# Patient Record
Sex: MALE | Race: WHITE | NOT HISPANIC OR LATINO | Employment: OTHER | URBAN - METROPOLITAN AREA
[De-identification: names, ages, dates, MRNs, and addresses within clinical notes are randomized per-mention and may not be internally consistent; named-entity substitution may affect disease eponyms.]

---

## 2021-02-23 ENCOUNTER — APPOINTMENT (EMERGENCY)
Dept: RADIOLOGY | Facility: HOSPITAL | Age: 86
DRG: 556 | End: 2021-02-23
Payer: MEDICARE

## 2021-02-23 ENCOUNTER — HOSPITAL ENCOUNTER (INPATIENT)
Facility: HOSPITAL | Age: 86
LOS: 3 days | Discharge: NURSING, REHAB OR LONG-TERM CARE | DRG: 556 | End: 2021-02-26
Attending: EMERGENCY MEDICINE | Admitting: INTERNAL MEDICINE
Payer: MEDICARE

## 2021-02-23 DIAGNOSIS — S50.311A ABRASION OF RIGHT ELBOW, INITIAL ENCOUNTER: ICD-10-CM

## 2021-02-23 DIAGNOSIS — M25.552 HIP TENDERNESS, LEFT: ICD-10-CM

## 2021-02-23 DIAGNOSIS — S80.02XA TRAUMATIC ECCHYMOSIS OF LEFT KNEE, INITIAL ENCOUNTER: ICD-10-CM

## 2021-02-23 DIAGNOSIS — S59.901A ELBOW INJURY, RIGHT, INITIAL ENCOUNTER: ICD-10-CM

## 2021-02-23 DIAGNOSIS — R26.2 AMBULATORY DYSFUNCTION: Primary | ICD-10-CM

## 2021-02-23 DIAGNOSIS — M25.562 ACUTE PAIN OF LEFT KNEE: ICD-10-CM

## 2021-02-23 PROBLEM — R29.6 MULTIPLE FALLS: Status: ACTIVE | Noted: 2021-02-23

## 2021-02-23 PROBLEM — M54.6 CHRONIC MIDLINE THORACIC BACK PAIN: Status: ACTIVE | Noted: 2021-02-23

## 2021-02-23 PROBLEM — Z85.51 HISTORY OF BLADDER CANCER: Status: ACTIVE | Noted: 2021-02-23

## 2021-02-23 PROBLEM — I10 BENIGN ESSENTIAL HYPERTENSION: Status: ACTIVE | Noted: 2021-02-23

## 2021-02-23 PROBLEM — N17.9 ACUTE RENAL FAILURE (ARF) (HCC): Status: ACTIVE | Noted: 2021-02-23

## 2021-02-23 PROBLEM — G89.29 CHRONIC MIDLINE THORACIC BACK PAIN: Status: ACTIVE | Noted: 2021-02-23

## 2021-02-23 PROBLEM — K21.9 GASTROESOPHAGEAL REFLUX DISEASE WITHOUT ESOPHAGITIS: Status: ACTIVE | Noted: 2021-02-23

## 2021-02-23 LAB
ANION GAP SERPL CALCULATED.3IONS-SCNC: 9 MMOL/L (ref 4–13)
ATRIAL RATE: 110 BPM
BACTERIA UR QL AUTO: ABNORMAL /HPF
BASOPHILS # BLD AUTO: 0.07 THOUSANDS/ΜL (ref 0–0.1)
BASOPHILS NFR BLD AUTO: 1 % (ref 0–1)
BILIRUB UR QL STRIP: ABNORMAL
BUN SERPL-MCNC: 38 MG/DL (ref 5–25)
CALCIUM SERPL-MCNC: 9.1 MG/DL (ref 8.3–10.1)
CHLORIDE SERPL-SCNC: 106 MMOL/L (ref 100–108)
CLARITY UR: CLEAR
CO2 SERPL-SCNC: 22 MMOL/L (ref 21–32)
COLOR UR: ABNORMAL
CREAT SERPL-MCNC: 1.71 MG/DL (ref 0.6–1.3)
EOSINOPHIL # BLD AUTO: 0.02 THOUSAND/ΜL (ref 0–0.61)
EOSINOPHIL NFR BLD AUTO: 0 % (ref 0–6)
ERYTHROCYTE [DISTWIDTH] IN BLOOD BY AUTOMATED COUNT: 13.2 % (ref 11.6–15.1)
GFR SERPL CREATININE-BSD FRML MDRD: 32 ML/MIN/1.73SQ M
GLUCOSE SERPL-MCNC: 130 MG/DL (ref 65–140)
GLUCOSE UR STRIP-MCNC: NEGATIVE MG/DL
HCT VFR BLD AUTO: 45.4 % (ref 36.5–49.3)
HGB BLD-MCNC: 14.5 G/DL (ref 12–17)
HGB UR QL STRIP.AUTO: ABNORMAL
HYALINE CASTS #/AREA URNS LPF: ABNORMAL /LPF
IMM GRANULOCYTES # BLD AUTO: 0.08 THOUSAND/UL (ref 0–0.2)
IMM GRANULOCYTES NFR BLD AUTO: 1 % (ref 0–2)
KETONES UR STRIP-MCNC: ABNORMAL MG/DL
LEUKOCYTE ESTERASE UR QL STRIP: NEGATIVE
LYMPHOCYTES # BLD AUTO: 1.22 THOUSANDS/ΜL (ref 0.6–4.47)
LYMPHOCYTES NFR BLD AUTO: 10 % (ref 14–44)
MCH RBC QN AUTO: 30.1 PG (ref 26.8–34.3)
MCHC RBC AUTO-ENTMCNC: 31.9 G/DL (ref 31.4–37.4)
MCV RBC AUTO: 94 FL (ref 82–98)
MONOCYTES # BLD AUTO: 1.11 THOUSAND/ΜL (ref 0.17–1.22)
MONOCYTES NFR BLD AUTO: 9 % (ref 4–12)
NEUTROPHILS # BLD AUTO: 9.64 THOUSANDS/ΜL (ref 1.85–7.62)
NEUTS SEG NFR BLD AUTO: 79 % (ref 43–75)
NITRITE UR QL STRIP: NEGATIVE
NON-SQ EPI CELLS URNS QL MICRO: ABNORMAL /HPF
NRBC BLD AUTO-RTO: 0 /100 WBCS
P AXIS: 62 DEGREES
PH UR STRIP.AUTO: 5.5 [PH]
PLATELET # BLD AUTO: 358 THOUSANDS/UL (ref 149–390)
PMV BLD AUTO: 9.5 FL (ref 8.9–12.7)
POTASSIUM SERPL-SCNC: 4.8 MMOL/L (ref 3.5–5.3)
PR INTERVAL: 148 MS
PROT UR STRIP-MCNC: ABNORMAL MG/DL
QRS AXIS: 43 DEGREES
QRSD INTERVAL: 128 MS
QT INTERVAL: 346 MS
QTC INTERVAL: 468 MS
RBC # BLD AUTO: 4.82 MILLION/UL (ref 3.88–5.62)
RBC #/AREA URNS AUTO: ABNORMAL /HPF
SODIUM SERPL-SCNC: 137 MMOL/L (ref 136–145)
SP GR UR STRIP.AUTO: 1.03 (ref 1–1.03)
T WAVE AXIS: -14 DEGREES
UROBILINOGEN UR QL STRIP.AUTO: 1 E.U./DL
VENTRICULAR RATE: 110 BPM
WBC # BLD AUTO: 12.14 THOUSAND/UL (ref 4.31–10.16)
WBC #/AREA URNS AUTO: ABNORMAL /HPF

## 2021-02-23 PROCEDURE — 93005 ELECTROCARDIOGRAM TRACING: CPT

## 2021-02-23 PROCEDURE — 85025 COMPLETE CBC W/AUTO DIFF WBC: CPT | Performed by: EMERGENCY MEDICINE

## 2021-02-23 PROCEDURE — 1124F ACP DISCUSS-NO DSCNMKR DOCD: CPT | Performed by: EMERGENCY MEDICINE

## 2021-02-23 PROCEDURE — 81001 URINALYSIS AUTO W/SCOPE: CPT | Performed by: INTERNAL MEDICINE

## 2021-02-23 PROCEDURE — 97167 OT EVAL HIGH COMPLEX 60 MIN: CPT

## 2021-02-23 PROCEDURE — 99285 EMERGENCY DEPT VISIT HI MDM: CPT

## 2021-02-23 PROCEDURE — 97163 PT EVAL HIGH COMPLEX 45 MIN: CPT

## 2021-02-23 PROCEDURE — 73560 X-RAY EXAM OF KNEE 1 OR 2: CPT

## 2021-02-23 PROCEDURE — G1004 CDSM NDSC: HCPCS

## 2021-02-23 PROCEDURE — 99232 SBSQ HOSP IP/OBS MODERATE 35: CPT | Performed by: INTERNAL MEDICINE

## 2021-02-23 PROCEDURE — 36415 COLL VENOUS BLD VENIPUNCTURE: CPT | Performed by: EMERGENCY MEDICINE

## 2021-02-23 PROCEDURE — 90715 TDAP VACCINE 7 YRS/> IM: CPT | Performed by: EMERGENCY MEDICINE

## 2021-02-23 PROCEDURE — 73502 X-RAY EXAM HIP UNI 2-3 VIEWS: CPT

## 2021-02-23 PROCEDURE — 99223 1ST HOSP IP/OBS HIGH 75: CPT | Performed by: INTERNAL MEDICINE

## 2021-02-23 PROCEDURE — 73080 X-RAY EXAM OF ELBOW: CPT

## 2021-02-23 PROCEDURE — 93010 ELECTROCARDIOGRAM REPORT: CPT | Performed by: INTERNAL MEDICINE

## 2021-02-23 PROCEDURE — 73030 X-RAY EXAM OF SHOULDER: CPT

## 2021-02-23 PROCEDURE — 90471 IMMUNIZATION ADMIN: CPT

## 2021-02-23 PROCEDURE — 99285 EMERGENCY DEPT VISIT HI MDM: CPT | Performed by: EMERGENCY MEDICINE

## 2021-02-23 PROCEDURE — 80048 BASIC METABOLIC PNL TOTAL CA: CPT | Performed by: EMERGENCY MEDICINE

## 2021-02-23 PROCEDURE — 70450 CT HEAD/BRAIN W/O DYE: CPT

## 2021-02-23 RX ORDER — SODIUM CHLORIDE 9 MG/ML
40 INJECTION, SOLUTION INTRAVENOUS CONTINUOUS
Status: DISCONTINUED | OUTPATIENT
Start: 2021-02-23 | End: 2021-02-26 | Stop reason: HOSPADM

## 2021-02-23 RX ORDER — PANTOPRAZOLE SODIUM 20 MG/1
20 TABLET, DELAYED RELEASE ORAL
Status: DISCONTINUED | OUTPATIENT
Start: 2021-02-23 | End: 2021-02-26 | Stop reason: HOSPADM

## 2021-02-23 RX ORDER — ONDANSETRON 2 MG/ML
4 INJECTION INTRAMUSCULAR; INTRAVENOUS EVERY 6 HOURS PRN
Status: DISCONTINUED | OUTPATIENT
Start: 2021-02-23 | End: 2021-02-26 | Stop reason: HOSPADM

## 2021-02-23 RX ORDER — MAGNESIUM HYDROXIDE/ALUMINUM HYDROXICE/SIMETHICONE 120; 1200; 1200 MG/30ML; MG/30ML; MG/30ML
15 SUSPENSION ORAL EVERY 6 HOURS PRN
Status: DISCONTINUED | OUTPATIENT
Start: 2021-02-23 | End: 2021-02-26 | Stop reason: HOSPADM

## 2021-02-23 RX ORDER — DOCUSATE SODIUM 100 MG/1
100 CAPSULE, LIQUID FILLED ORAL 2 TIMES DAILY PRN
Status: DISCONTINUED | OUTPATIENT
Start: 2021-02-23 | End: 2021-02-26 | Stop reason: HOSPADM

## 2021-02-23 RX ORDER — LOSARTAN POTASSIUM 50 MG/1
50 TABLET ORAL DAILY
Status: DISCONTINUED | OUTPATIENT
Start: 2021-02-23 | End: 2021-02-23

## 2021-02-23 RX ORDER — TRAMADOL HYDROCHLORIDE 50 MG/1
50 TABLET ORAL EVERY 8 HOURS PRN
Status: DISCONTINUED | OUTPATIENT
Start: 2021-02-23 | End: 2021-02-26 | Stop reason: HOSPADM

## 2021-02-23 RX ORDER — ACETAMINOPHEN 325 MG/1
650 TABLET ORAL EVERY 6 HOURS PRN
Status: DISCONTINUED | OUTPATIENT
Start: 2021-02-23 | End: 2021-02-26 | Stop reason: HOSPADM

## 2021-02-23 RX ORDER — LIDOCAINE 50 MG/G
1 PATCH TOPICAL DAILY
Status: DISCONTINUED | OUTPATIENT
Start: 2021-02-23 | End: 2021-02-26 | Stop reason: HOSPADM

## 2021-02-23 RX ORDER — ACETAMINOPHEN 325 MG/1
975 TABLET ORAL ONCE
Status: COMPLETED | OUTPATIENT
Start: 2021-02-23 | End: 2021-02-23

## 2021-02-23 RX ADMIN — SODIUM CHLORIDE 40 ML/HR: 0.9 INJECTION, SOLUTION INTRAVENOUS at 13:37

## 2021-02-23 RX ADMIN — ENOXAPARIN SODIUM 30 MG: 30 INJECTION SUBCUTANEOUS at 09:19

## 2021-02-23 RX ADMIN — ACETAMINOPHEN 975 MG: 325 TABLET ORAL at 03:37

## 2021-02-23 RX ADMIN — Medication 1 TABLET: at 09:19

## 2021-02-23 RX ADMIN — LIDOCAINE 1 PATCH: 50 PATCH TOPICAL at 09:20

## 2021-02-23 RX ADMIN — LOSARTAN POTASSIUM 50 MG: 50 TABLET, FILM COATED ORAL at 09:19

## 2021-02-23 RX ADMIN — TETANUS TOXOID, REDUCED DIPHTHERIA TOXOID AND ACELLULAR PERTUSSIS VACCINE, ADSORBED 0.5 ML: 5; 2.5; 8; 8; 2.5 SUSPENSION INTRAMUSCULAR at 04:01

## 2021-02-23 RX ADMIN — PANTOPRAZOLE SODIUM 20 MG: 20 TABLET, DELAYED RELEASE ORAL at 05:45

## 2021-02-23 NOTE — PHYSICAL THERAPY NOTE
Physical Therapy Evaluation     Patient's Name: Luci Luther    Admitting Diagnosis  Hip tenderness, left [M25 552]  Abrasion of right elbow, initial encounter [S50 311A]  Elbow injury, right, initial encounter [S59 901A]  Ambulatory dysfunction [R26 2]  Acute pain of left knee [M25 562]  Traumatic ecchymosis of left knee, initial encounter [S80 02XA]  Unspecified multiple injuries, initial encounter [T07  XXXA]    Problem List  Patient Active Problem List   Diagnosis    Ambulatory dysfunction    Multiple falls    Benign essential hypertension    Chronic midline thoracic back pain    Gastroesophageal reflux disease without esophagitis    History of bladder cancer    Acute renal failure (ARF) ([facility] [address] )       Past Medical History  History reviewed  No pertinent past medical history  Past Surgical History  Past Surgical History:   Procedure Laterality Date    ANKLE ARTHROSCOPY      BLEPHAROPLASTY      GALLBLADDER SURGERY      KNEE ARTHROSCOPY            02/23/21 1102   PT Last Visit   PT Visit Date 02/23/21   Note Type   Note type Evaluation   Pain Assessment   Pain Assessment Tool 0-10   Pain Score 4   Pain Location/Orientation Orientation: Right;Location: Shoulder   Hospital Pain Intervention(s) Repositioned; Ambulation/increased activity   Home Living   Type of [address] [place] [address] Two level;Bed/bath upstairs  (about 14 steps to 2nd floor (landing after 7 steps))   Bathroom Shower/Tub Walk-in shower   Bathroom Toilet Raised   Bathroom Equipment Shower chair   Home Equipment Walker; Wheelchair-manual;Other (Comment)  (RW with seat is primary device  WC for longer distances)   Additional Comments   (WC used when with family for longer distances)   Prior Function   Level of [place] Independent with ADLs and functional mobility; Needs assistance with IADLs; Other (Comment)  (does not drive)   Lives With Alone   Receives Help From Family  (daughter)   ADL Assistance Independent   IADLs Needs assistance Falls in the last 6 months 1 to 4  (2; both recent)   Vocational Retired   Comments Pt reports L LE buckling unpredictably resulting in falls   Restrictions/Precautions   Weight Bearing Precautions Per Order No   Other Precautions Chair Alarm; Fall Risk;Pain   General   Family/Caregiver Present No   Cognition   Overall Cognitive Status WFL   Orientation Level Oriented X4   Comments Pt pleasant and cooperative  Follows commands well  RLE Assessment   RLE Assessment WFL  (Hip flexion, knee extension, ankle DF and PF all grossly WFL)   LLE Assessment   LLE Assessment WFL  (Hip flexion, knee extension, ankle DF and PF all grossly WFL)   Coordination   Sensation WFL   Bed Mobility   Supine to Sit 3  Moderate assistance   Additional items Assist x 2;HOB elevated; Bedrails; Increased time required;Verbal cues;LE management   Sit to Supine Unable to assess   Transfers   Sit to Stand 3  Moderate assistance  (mod Ax1 with 1st rep; min Ax1 with 2nd rep)   Additional items Increased time required;Verbal cues; Assist x 1  (RW)   Stand to Sit 4  Minimal assistance   Additional items Increased time required;Verbal cues; Assist x 1   Ambulation/Elevation   Gait pattern Narrow HEATHER; Forward Flexion;Decreased foot clearance;Shuffling; Short stride; Excessively slow   Gait Assistance 4  Minimal assist   Additional items Assist x 1;Verbal cues   Assistive Device Rolling walker   Distance 20 ft; limited by fatigue  (+2 assist for chair follow)   Stair Management Assistance Not tested   Balance   Static Sitting Fair   Dynamic Sitting Fair -   Static Standing Fair -   Dynamic Standing Poor +   Ambulatory Poor +   Endurance Deficit   Endurance Deficit Yes   Endurance Deficit Description limited by pain, fatigue, weakness, and decreased activity tolerance   Activity Tolerance   Activity Tolerance Patient limited by fatigue;Patient limited by pain   Medical Staff Made Aware OT   Nurse Made Aware Yes   Assessment   Prognosis Good   Problem List Decreased endurance; Impaired balance;Pain;Decreased strength;Decreased mobility; Decreased safety awareness   Assessment Pt is a 80 y o male who presents to B with ambulatory dysfunction after having 2 falls within the past 24 hours  Pt taken to ED by EMS on 2/23/2021  Imaging negative  Pt c/o R elbow, R shoulder, and L hip discomfort related to falls  Pt has a PMH that includes HTN, L TKA, GERD, bladder CA (remission) and chronic back pain that is treated with Tramadol  Active PT orders for functional mobility assessment and D/C planning  Pt was pleasant and cooperative throughout visit  Pt reports that he lives alone in a [facility] with bed and bath on the 2nd floor and about 14 steps to the second floor with a landing after 7 steps  Pt states he was independent with ADLs at baseline but has assistance with IADLs including laundry  Pt does not drive  Pt reports using a Rollator with a seat around the house and has a wheelchair for longer distances  Other details of home setup can be found above  Pt required a Mod Ax2 for supine to sit transfer and needed verbal cues for hand placement as well as UB and LE management  Once seated EOB, pt was able to maintain balance without reports of dizziness  Pt required a Mod Ax1 for sit to stand transfer with RW and needed verbal cues for hand placement on RW and EOB for safety  Pt demonstrated shuffling gait and appeared apprehensive to take steps  Pt able to transfer with good control from stand to sit but still required a Min Ax1 for safety  After a quick break, pt was able to sit to stand transfer with a Min Ax1 and ambulated down the lockhart for about 20 feet with RW and chair follow before needing to sit down  Pt reported feeling slightly out of breath but VS stable  Pt was returned to room where he was left in chair with chair alarm on, call bell in reach and all other needs met  Other objective measures of assessment can be found above   At this time, pt would benefit from skilled home PT as long as patient has increased family support to assist him with ADLs/IADLs as pt is unsafe to live alone at this time  Spoke with CM about making these arrangements  If unable to receive family support, PT recommends post acute rehabilitation services  The patient's AM-PAC Basic Mobility Inpatient Short Form Raw Score is 11, Standardized Score is 30 25  A standardized score less than 42 9 suggests the patient may benefit from discharge to post-acute rehabilitation services  Please also refer to the recommendation of the Physical Therapist for safe discharge planning  At this time, pt is considered to be of high complexity secondary to risk for fal   Barriers to Discharge Decreased caregiver support  (Pt lives alone)   Goals   Patient Goals go home   STG Expiration Date 03/07/21   Short Term Goal #1 STG1: Pt will improve LE strength to assist in functional transfer of sit to stand and to decrease burden of caregiver  STG2: Pt will be able to transfer from sit to stand with Mod I to improve functional independence  STG3: Pt will be able to improve bed mobility to an independent level to decrease burden of caregiver and improve functional indepedence  STG4: Pt will improve in all balance categories by 1 grade to decrease risk of falls  STG5: Pt will be able to ambulate 150 ft Mod I to improve aerobic capacity and return to [facility] as a household ambulator  STG6: Pt will be able to ambulate up and down 14 steps Mod I to ensure safe D/C to home environment and improve functional mobility  PT Treatment Day 0   Plan   Treatment/Interventions Functional transfer training;LE strengthening/ROM; Elevations; Therapeutic exercise; Endurance training;Gait training;Spoke to nursing;Spoke to case management;OT;Bed mobility   PT Frequency Other (Comment)  (3-5x/wk)   Recommendation   PT Discharge Recommendation Post-Acute Rehabilitation Services  (pending progress and family support)   Equipment Recommended Corie Pollack  (pt owns Rollator with seat that he used at baseline)   PT - OK to Discharge Yes  (pending medical clearance)   Additional Comments D/C home with skilled therapy IF pt is able to receive increased family support  D/C post acute rehab IF no family support available as pt is unsafe to live alone     AM-PAC Basic Mobility Inpatient   Turning in Bed Without Bedrails 2   Lying on Back to Sitting on Edge of Flat Bed 1   Moving Bed to Chair 2   Standing Up From Chair 2   Walk in Room 3   Climb 3-5 Stairs 1   Basic Mobility Inpatient Raw Score 11   Basic Mobility Standardized Score 30 25         Taiwo Crimes, SPT

## 2021-02-23 NOTE — PLAN OF CARE
Problem: PAIN - ADULT  Goal: Verbalizes/displays adequate comfort level or baseline comfort level  Description: Interventions:  - Encourage patient to monitor pain and request assistance  - Assess pain using appropriate pain scale  - Administer analgesics based on type and severity of pain and evaluate response  - Implement non-pharmacological measures as appropriate and evaluate response  - Consider cultural and social influences on pain and pain management  - Notify physician/advanced practitioner if interventions unsuccessful or patient reports new pain  Outcome: Progressing     Problem: SAFETY ADULT  Goal: Patient will remain free of falls  Description: INTERVENTIONS:  - Assess patient frequently for physical needs  -  Identify cognitive and physical deficits and behaviors that affect risk of falls    -  [place] fall precautions as indicated by assessment   - Educate patient/family on patient safety including physical limitations  - Instruct patient to call for assistance with activity based on assessment  - Modify environment to reduce risk of injury  - Consider OT/PT consult to assist with strengthening/mobility  Outcome: Progressing  Goal: Maintain or return to baseline ADL function  Description: INTERVENTIONS:  -  Assess patient's ability to carry out ADLs; assess patient's baseline for ADL function and identify physical deficits which impact ability to perform ADLs (bathing, care of mouth/teeth, toileting, grooming, dressing, etc )  - Assess/evaluate cause of self-care deficits   - Assess range of motion  - Assess patient's mobility; develop plan if impaired  - Assess patient's need for assistive devices and provide as appropriate  - Encourage maximum independence but intervene and supervise when necessary  - Involve family in performance of ADLs  - Assess for home care needs following discharge   - Consider OT consult to assist with ADL evaluation and planning for discharge  - Provide patient education as appropriate  Outcome: Progressing  Goal: Maintain or return mobility status to optimal level  Description: INTERVENTIONS:  - Assess patient's baseline mobility status (ambulation, transfers, stairs, etc )    - Identify cognitive and physical deficits and behaviors that affect mobility  - Identify mobility aids required to assist with transfers and/or ambulation (gait belt, sit-to-stand, lift, walker, cane, etc )  - [place] fall precautions as indicated by assessment  - Record patient progress and toleration of activity level on Mobility SBAR; progress patient to next Phase/Stage  - Instruct patient to call for assistance with activity based on assessment  - Consider rehabilitation consult to assist with strengthening/weightbearing, etc   Outcome: Progressing     Problem: DISCHARGE PLANNING  Goal: Discharge to home or other facility with appropriate resources  Description: INTERVENTIONS:  - Identify barriers to discharge w/patient and caregiver  - Arrange for needed discharge resources and transportation as appropriate  - Identify discharge learning needs (meds, wound care, etc )  - Arrange for interpretive services to assist at discharge as needed  - Refer to Case Management Department for coordinating discharge planning if the patient needs post-hospital services based on physician/advanced practitioner order or complex needs related to functional status, cognitive ability, or social support system  Outcome: Progressing     Problem: Knowledge Deficit  Goal: Patient/family/caregiver demonstrates understanding of disease process, treatment plan, medications, and discharge instructions  Description: Complete learning assessment and assess knowledge base    Interventions:  - Provide teaching at level of understanding  - Provide teaching via preferred learning methods  Outcome: Progressing     Problem: Potential for Falls  Goal: Patient will remain free of falls  Description: INTERVENTIONS:  - Assess patient frequently for physical needs  -  Identify cognitive and physical deficits and behaviors that affect risk of falls    -  [place] fall precautions as indicated by assessment   - Educate patient/family on patient safety including physical limitations  - Instruct patient to call for assistance with activity based on assessment  - Modify environment to reduce risk of injury  - Consider OT/PT consult to assist with strengthening/mobility  Outcome: Progressing

## 2021-02-23 NOTE — ED ATTENDING ATTESTATION
2/23/2021  I, Hany Bassett MD, saw and evaluated the patient  I have discussed the patient with the resident/non-physician practitioner and agree with the resident's/non-physician practitioner's findings, Plan of Care, and MDM as documented in the resident's/non-physician practitioner's note, except where noted  All available labs and Radiology studies were reviewed  I was present for key portions of any procedure(s) performed by the resident/non-physician practitioner and I was immediately available to provide assistance  At this point I agree with the current assessment done in the Emergency Department  I have conducted an independent evaluation of this patient a history and physical is as follows:    ED Course      Emergency Department Note- Vickie Benjamin 80 y o  male MRN: [id number]    Unit/Bed#: ED 28 Encounter: [id number]    Vickie Benjamin is a 80 y o  male who presents with No chief complaint on file  History of Present Illness   HPI:  Vickie Benjamin is a 80 y o  male who presents for evaluation of:  Fall x2 over the last 24 hours  Patient fell PTA and was unable to get up  Patient does not take AC or AP medications  Patient fell earlier in the day yesterday, but was able to ambulate after that  Patient has chronic back pain that he takes tramadol for  Patient has a h/o L knee replacement  Review of Systems   Constitutional: Negative for chills and fever  HENT: Negative for congestion and rhinorrhea  Respiratory: Negative for cough and shortness of breath  Neurological: Negative for syncope and headaches  All other systems reviewed and are negative  Historical Information   No past medical history on file    Past Surgical History:   Procedure Laterality Date    ANKLE ARTHROSCOPY      BLEPHAROPLASTY      GALLBLADDER SURGERY      KNEE ARTHROSCOPY       Social History   Social History     Substance and Sexual Activity   Alcohol Use Never    Frequency: Never     Social History     Substance and Sexual Activity   Drug Use Never     Social History     Tobacco Use   Smoking Status Former Smoker   Smokeless Tobacco Never Used     Family History: non-contributory    Meds/Allergies   all medications and allergies reviewed  No Known Allergies    Objective   First Vitals:        Current Vitals:        No intake or output data in the 24 hours ending 21 0245    Invasive Devices     None                 Physical Exam  Vitals signs and nursing note reviewed  Constitutional:       Appearance: Normal appearance  HENT:      Right Ear: External ear normal       Left Ear: External ear normal       Nose: Nose normal    Abdominal:      General: Bowel sounds are normal       Palpations: Abdomen is soft  Musculoskeletal:         General: Tenderness (L hip, L Knee, R shoulder, R elbow) present  Skin:     General: Skin is warm and dry  Capillary Refill: Capillary refill takes less than 2 seconds  Neurological:      General: No focal deficit present  Mental Status: He is alert and oriented to person, place, and time  Psychiatric:         Thought Content: Thought content normal          Judgment: Judgment normal            Medical Decision Makin  L hip, L knee, R shoulder, R elbow pain post fall    No results found for this or any previous visit (from the past 36 hour(s))  No orders to display         Portions of the record may have been created with voice recognition software  Occasional wrong word or "sound a like" substitutions may have occurred due to the inherent limitations of voice recognition software  Read the chart carefully and recognize, using context, where substitutions have occurred            Critical Care Time  Procedures

## 2021-02-23 NOTE — PROGRESS NOTES
Progress Note Uri Jha 1921, 80 y o  male MRN: [id number]    Unit/Bed#: [facility] 825-01 Encounter: [id number]    Primary Care Provider: Jocelyne Lobato MD   Date and time admitted to hospital: 2021  2:34 AM        * Ambulatory dysfunction  Assessment & Plan  Multiple falls at home  Incurring right elbow and shoulder discomfort with left hip discomfort  No fractures  Physical therapy consult regarding ADLs and safety; possible need for rehab  Will discuss with case management further planning and care  Follow-up on labs  Urine analysis  Currently on acetaminophen 650 every 6 hours as needed; Ultram 50 mg every 8 hours as needed; will also add lidocaine patches to the left hip  Acute renal failure (ARF) (HCC)  Assessment & Plan  Gentle hydration  Monitor renal function  Baseline creatinine between 1 3 and 1 5    Benign essential hypertension  Assessment & Plan  Hold Cozaar for now  Patient with elevated creatinine  Unclear baseline  VTE Pharmacologic Prophylaxis:   Pharmacologic: Enoxaparin (Lovenox)  Mechanical VTE Prophylaxis in Place: No    Patient Centered Rounds: I have performed bedside rounds with nursing staff today  Time Spent for Care: 15 minutes  More than 50% of total time spent on counseling and coordination of care as described above  Current Length of Stay: 0 day(s)    Current Patient Status: Inpatient   Certification Statement: The patient will continue to require additional inpatient hospital stay due to Need to monitor symptoms    Discharge Plan:  Not ready for discharge yet  Will reassess renal function  Will follow-up on physical therapy assessment regarding rehab      Code Status: Level 3 - DNAR and DNI      Subjective:   Patient comfortable    Objective:     Vitals:   Temp (24hrs), Av °F (36 7 °C), Min:97 6 °F (36 4 °C), Max:98 8 °F (37 1 °C)    Temp:  [97 6 °F (36 4 °C)-98 8 °F (37 1 °C)] 97 6 °F (36 4 °C)  HR:  [] 75  Resp:  [16-18] 16  BP: (140-145)/(65-82) 142/76  SpO2:  [95 %-97 %] 95 %  Body mass index is 35 6 kg/m²  Input and Output Summary (last 24 hours): Intake/Output Summary (Last 24 hours) at 2/23/2021 1229  Last data filed at 2/23/2021 0830  Gross per 24 hour   Intake 360 ml   Output --   Net 360 ml       Physical Exam:     Physical Exam  HENT:      Head: Normocephalic and atraumatic  Cardiovascular:      Rate and Rhythm: Normal rate and regular rhythm  Heart sounds: No murmur  No friction rub  Pulmonary:      Effort: Pulmonary effort is normal       Breath sounds: Normal breath sounds  Abdominal:      General: Bowel sounds are normal  There is no distension  Palpations: There is no mass  Musculoskeletal:         General: No swelling  Neurological:      General: No focal deficit present  Mental Status: He is alert and oriented to person, place, and time  Additional Data:     Labs:    Results from last 7 days   Lab Units 02/23/21  0525   WBC Thousand/uL 12 14*   HEMOGLOBIN g/dL 14 5   HEMATOCRIT % 45 4   PLATELETS Thousands/uL 358   NEUTROS PCT % 79*   LYMPHS PCT % 10*   MONOS PCT % 9   EOS PCT % 0     Results from last 7 days   Lab Units 02/23/21  0524   POTASSIUM mmol/L 4 8   CHLORIDE mmol/L 106   CO2 mmol/L 22   BUN mg/dL 38*   CREATININE mg/dL 1 71*   CALCIUM mg/dL 9 1           * I Have Reviewed All Lab Data Listed Above  * Additional Pertinent Lab Tests Reviewed:  All Labs Within Last 24 Hours Reviewed        Recent Cultures (last 7 days):           Last 24 Hours Medication List:   Current Facility-Administered Medications   Medication Dose Route Frequency Provider Last Rate    acetaminophen  650 mg Oral Q6H PRN Henry Abraham MD      aluminum-magnesium hydroxide-simethicone  15 mL Oral Q6H PRN Henry Abraham MD      docusate sodium  100 mg Oral BID PRN Henry Abraham MD      enoxaparin  30 mg Subcutaneous Daily Henry Abraham MD      lidocaine  1 patch Topical Daily Derrek Brittany Heller MD      losartan  50 mg Oral Daily Papito Elam MD      multivitamin-minerals  1 tablet Oral Daily Papito Elam MD      ondansetron  4 mg Intravenous Q6H PRN Papito Elam MD      pantoprazole  20 mg Oral Early Morning Papito Elam MD      sodium chloride  40 mL/hr Intravenous Continuous Hetjohnson Perez DO      traMADol  50 mg Oral Q8H PRN Papito Elam MD          Today, Patient Was Seen By: Flavio Vazquez DO    ** Please Note: Dictation voice to text software may have been used in the creation of this document   **

## 2021-02-23 NOTE — ED PROVIDER NOTES
History  Chief Complaint   Patient presents with   Valaria Reil Fall     pt had 2 falls today at home  pts states he was gonig to the bathroom and slipped on the floor  pt fell forward  denies LOC  pt c/o right elbow pain and bilateral knee pain  8 year old male history of hypertension presenting after 2 mechanical ground level falls  Patient reports that he lives alone and walks with a walker at baseline  He reports that he tripped earlier today falling forward onto his knees  His son was home was able to get him back to his feet  This evening, patient was attempting to go to the bathroom when he felt like his left knee gave out  He reports falling forward being face down and unable to get up  Patient reports he called 911 at that time given that he could not get up  Currently complaining of left knee pain and right elbow pain with overlying abrasion and right shoulder pain  Reports he does not believe he will be able to ambulate given his left knee pain  He denies any headache, vision changes, chest pain, shortness of breath, abdominal pain, nausea/vomiting, fever/chills, or any bladder or bowel changes  Prior to Admission Medications   Prescriptions Last Dose Informant Patient Reported? Taking? Multiple Vitamins-Minerals (CENTRUM SILVER ADULT 50+ PO) 2/22/2021 at Unknown time  Yes Yes   Sig: Take by mouth   losartan (COZAAR) 50 mg tablet 2/22/2021 at Unknown time  Yes Yes   Sig: TK 1 T PO QD   omeprazole (PriLOSEC) 20 mg delayed release capsule 2/22/2021 at Unknown time  Yes Yes   traMADol (ULTRAM) 50 mg tablet 2/22/2021 at Unknown time  Yes Yes   Sig: Take 50 mg by mouth every 8 (eight) hours as needed      Facility-Administered Medications: None       History reviewed  No pertinent past medical history      Past Surgical History:   Procedure Laterality Date    ANKLE ARTHROSCOPY      BLEPHAROPLASTY      GALLBLADDER SURGERY      KNEE ARTHROSCOPY         Family History   Problem Relation Age of Onset    Cancer Family      I have reviewed and agree with the history as documented  E-Cigarette/Vaping     E-Cigarette/Vaping Substances    Nicotine No     THC No     CBD No     Flavoring No     Other No     Unknown No      Social History     Tobacco Use    Smoking status: Former Smoker    Smokeless tobacco: Never Used   Substance Use Topics    Alcohol use: Never     Frequency: Never    Drug use: Never        Review of Systems   Constitutional: Negative for appetite change, chills, diaphoresis, fever and unexpected weight change  HENT: Negative for congestion and rhinorrhea  Eyes: Negative for photophobia and visual disturbance  Respiratory: Negative for cough, chest tightness and shortness of breath  Cardiovascular: Negative for chest pain, palpitations and leg swelling  Gastrointestinal: Negative for abdominal distention, abdominal pain, blood in stool, constipation, diarrhea, nausea and vomiting  Genitourinary: Negative for dysuria and hematuria  Musculoskeletal: Positive for arthralgias  Negative for back pain, joint swelling, neck pain and neck stiffness  Skin: Positive for wound  Negative for color change, pallor and rash  Neurological: Negative for dizziness, syncope, weakness, light-headedness and headaches  Psychiatric/Behavioral: Negative for agitation  All other systems reviewed and are negative        Physical Exam  ED Triage Vitals   Temperature Pulse Respirations Blood Pressure SpO2   02/23/21 0245 02/23/21 0244 02/23/21 0244 02/23/21 0244 02/23/21 0244   98 8 °F (37 1 °C) (!) 110 18 143/79 95 %      Temp src Heart Rate Source Patient Position - Orthostatic VS BP Location FiO2 (%)   -- -- -- -- --             Pain Score       02/23/21 0745       No Pain             Orthostatic Vital Signs  Vitals:    02/25/21 0818 02/25/21 1529 02/25/21 2215 02/26/21 0700   BP: 155/88 157/77 128/67 130/66   Pulse: 84  78 77       Physical Exam  Vitals signs and nursing note reviewed  Constitutional:       General: He is not in acute distress  Appearance: Normal appearance  He is well-developed  He is not ill-appearing, toxic-appearing or diaphoretic  HENT:      Head: Normocephalic and atraumatic  Nose: Nose normal  No congestion or rhinorrhea  Mouth/Throat:      Mouth: Mucous membranes are moist       Pharynx: Oropharynx is clear  No oropharyngeal exudate or posterior oropharyngeal erythema  Eyes:      General: No scleral icterus  Right eye: No discharge  Left eye: No discharge  Extraocular Movements: Extraocular movements intact  Conjunctiva/sclera: Conjunctivae normal       Pupils: Pupils are equal, round, and reactive to light  Neck:      Musculoskeletal: Normal range of motion and neck supple  No neck rigidity or muscular tenderness  Vascular: No JVD  Trachea: No tracheal deviation  Cardiovascular:      Rate and Rhythm: Normal rate and regular rhythm  Heart sounds: Normal heart sounds  No murmur  No friction rub  No gallop  Comments: Tachycardic to 110s  Regular rhythm  Pulmonary:      Effort: Pulmonary effort is normal  No respiratory distress  Breath sounds: Normal breath sounds  No stridor  No wheezing or rales  Comments: Clear to auscultation bilaterally  Chest:      Chest wall: No tenderness  Abdominal:      General: Bowel sounds are normal  There is no distension  Palpations: Abdomen is soft  Tenderness: There is no abdominal tenderness  There is no right CVA tenderness, left CVA tenderness, guarding or rebound  Comments: Soft, nontender, nondistended  Normal bowel sounds throughout   Musculoskeletal: Normal range of motion  General: Tenderness present  No swelling, deformity or signs of injury  Right lower leg: No edema  Left lower leg: No edema  Comments: Patient with ecchymosis and overlying tenderness palpation left knee    Tenderness to palpation left hip   Patient with abrasion over the lateral aspect of right elbow with overlying tenderness to palpation  Tender right shoulder with limited range of motion  No lower signs of trauma or tenderness along neck or back or head  Except for pertinent positives and negatives above and below, remainder of full body head to toe trauma assessment including but not limited to visual assessment, palpation, range of motion of all extremities was otherwise unremarkable  Lymphadenopathy:      Cervical: No cervical adenopathy  Skin:     General: Skin is warm and dry  Coloration: Skin is not pale  Findings: Bruising present  No erythema or rash  Neurological:      General: No focal deficit present  Mental Status: He is alert and oriented to person, place, and time  Mental status is at baseline  Cranial Nerves: No cranial nerve deficit  Sensory: No sensory deficit  Motor: No weakness or abnormal muscle tone  Coordination: Coordination normal       Gait: Gait normal       Comments: A&Ox3 to person, place, and time  CN 2-12 intact  Strength 5/5 throughout  Sensation grossly intact  Cerebellar exam including gait intact  Psychiatric:         Behavior: Behavior normal          Thought Content:  Thought content normal          ED Medications  Medications   acetaminophen (TYLENOL) tablet 975 mg (975 mg Oral Given 2/23/21 0337)   tetanus-diphtheria-acellular pertussis (BOOSTRIX) IM injection 0 5 mL (0 5 mL Intramuscular Given 2/23/21 0401)       Diagnostic Studies  Results Reviewed     Procedure Component Value Units Date/Time    UA (URINE) with reflex to Scope [[id number]]  (Abnormal) Collected: 02/23/21 1544    Lab Status: Final result Specimen: Urine, Straight Cath Updated: 02/23/21 1621     Color, UA Dk Yellow     Clarity, UA Clear     Specific Gravity, UA 1 026     pH, UA 5 5     Leukocytes, UA Negative     Nitrite, UA Negative     Protein, UA 30 (1+) mg/dl      Glucose, UA Negative mg/dl Ketones, UA Trace mg/dl      Urobilinogen, UA 1 0 E U /dl      Bilirubin, UA Interference- unable to analyze     Blood, UA Moderate    Basic metabolic panel [[id number]]  (Abnormal) Collected: 02/23/21 0524    Lab Status: Final result Specimen: Blood from Arm, Right Updated: 02/23/21 0615     Sodium 137 mmol/L      Potassium 4 8 mmol/L      Chloride 106 mmol/L      CO2 22 mmol/L      ANION GAP 9 mmol/L      BUN 38 mg/dL      Creatinine 1 71 mg/dL      Glucose 130 mg/dL      Calcium 9 1 mg/dL      eGFR 32 ml/min/1 73sq m     Narrative:      National Kidney Disease Foundation guidelines for Chronic Kidney Disease (CKD):     Stage 1 with normal or high GFR (GFR > 90 mL/min/1 73 square meters)    Stage 2 Mild CKD (GFR = 60-89 mL/min/1 73 square meters)    Stage 3A Moderate CKD (GFR = 45-59 mL/min/1 73 square meters)    Stage 3B Moderate CKD (GFR = 30-44 mL/min/1 73 square meters)    Stage 4 Severe CKD (GFR = 15-29 mL/min/1 73 square meters)    Stage 5 End Stage CKD (GFR <15 mL/min/1 73 square meters)  Note: GFR calculation is accurate only with a steady state creatinine    CBC and differential [[id number]]  (Abnormal) Collected: 02/23/21 0525    Lab Status: Final result Specimen: Blood from Arm, Right Updated: 02/23/21 0535     WBC 12 14 Thousand/uL      RBC 4 82 Million/uL      Hemoglobin 14 5 g/dL      Hematocrit 45 4 %      MCV 94 fL      MCH 30 1 pg      MCHC 31 9 g/dL      RDW 13 2 %      MPV 9 5 fL      Platelets 735 Thousands/uL      nRBC 0 /100 WBCs      Neutrophils Relative 79 %      Immat GRANS % 1 %      Lymphocytes Relative 10 %      Monocytes Relative 9 %      Eosinophils Relative 0 %      Basophils Relative 1 %      Neutrophils Absolute 9 64 Thousands/µL      Immature Grans Absolute 0 08 Thousand/uL      Lymphocytes Absolute 1 22 Thousands/µL      Monocytes Absolute 1 11 Thousand/µL      Eosinophils Absolute 0 02 Thousand/µL      Basophils Absolute 0 07 Thousands/µL                  XR hip/pelv 2-3 vws left   ED Interpretation by Taras Hill MD (02/23 1285)   No fx        Final Result by Joel Damon MD (02/23 1387)      No acute osseous abnormality  Workstation performed: [id number]         XR shoulder 2+ views RIGHT   Final Result by Joel Damon MD (02/23 8104)      No acute osseous abnormality  Workstation performed: [id number]         XR elbow 3+ views RIGHT   Final Result by Joel Damon MD (02/23 9283)      No acute osseous abnormality  Workstation performed: [id number]         XR knee 1 or 2 vw right   Final Result by Joel Damon MD (02/23 4311)      No acute osseous abnormality  Intact total knee arthroplasty  Workstation performed: [id number]         CT head without contrast   Final Result by Henrry Quezada MD (02/23 6644)      No acute intracranial abnormality  Microangiopathic changes  Workstation performed: [id number]               Procedures  ECG 12 Lead Documentation Only    Date/Time: 2/23/2021 2:45 AM  Performed by: Graciela Hernandez MD  Authorized by: Graciela Hernandez MD     Indications / Diagnosis:  Tachy  ECG reviewed by me, the ED Provider: yes    Patient location:  ED  Previous ECG:     Previous ECG:  Compared to current    Comparison ECG info: Now with some T-wave inversions and right bundle-branch block and tachy    Similarity:  Changes noted    Comparison to cardiac monitor: Yes    Interpretation:     Interpretation: abnormal    Rate:     ECG rate:  110    ECG rate assessment: tachycardic    Rhythm:     Rhythm: sinus tachycardia    Ectopy:     Ectopy: none    QRS:     QRS axis:  Normal    QRS intervals:   Wide  Conduction:     Conduction: abnormal      Abnormal conduction: complete RBBB    ST segments:     ST segments:  Non-specific  T waves:     T waves: non-specific and inverted    Q waves:     Q waves:  V2, V3, III and aVF          ED Course                                       MDM  Number of Diagnoses or Management Options  Abrasion of right elbow, initial encounter:   Acute pain of left knee:   Ambulatory dysfunction:   Elbow injury, right, initial encounter:   Hip tenderness, left:   Traumatic ecchymosis of left knee, initial encounter:   Diagnosis management comments: 8 year old male history of hypertension presenting after 2 mechanical ground level falls  Patient with reported mechanical falls normally walking with a walker at baseline  Patient denies head strike but reports falling forward and was stuck face down on the ground  Concern for head strike  Higher risk given age  Plan for CT head  Also plan for x-rays of right elbow, right shoulder, left hip and pelvis, and left knee  Tylenol for pain  Likely admission for ambulatory dysfunction given patient stating he probably cannot walk  Reassess  Tetanus booster given  Pain improved after Tylenol  CT head no acute process  X-rays no acute process  Right elbow abrasion irrigated copiously and bandaged  Patient unable to bear weight left lower extremity  Admitted for ambulatory dysfunction         Amount and/or Complexity of Data Reviewed  Clinical lab tests: reviewed  Tests in the radiology section of CPT®: reviewed  Tests in the medicine section of CPT®: reviewed  Review and summarize past medical records: yes  Independent visualization of images, tracings, or specimens: yes    Risk of Complications, Morbidity, and/or Mortality  Presenting problems: moderate  Diagnostic procedures: moderate  Management options: moderate    Patient Progress  Patient progress: stable      Disposition  Final diagnoses:   Ambulatory dysfunction   Elbow injury, right, initial encounter   Abrasion of right elbow, initial encounter   Acute pain of left knee   Traumatic ecchymosis of left knee, initial encounter   Hip tenderness, left     Time reflects when diagnosis was documented in both MDM as applicable and the Disposition within this note     Time User Action Codes Description Comment    2/23/2021  3:17 AM Junito Sartorius Add [R26 2] Ambulatory dysfunction     2/23/2021  3:17 AM [place] Sartorius Add [S59 902A] Elbow injury, left, initial encounter     2/23/2021  3:17 AM [place] Sartorius Remove [S59 902A] Elbow injury, left, initial encounter     2/23/2021  3:17 AM [place] Sartorius Add [S59 901A] Elbow injury, right, initial encounter     2/23/2021  3:17 AM Junito Sartorius Add [S50 311A] Abrasion of right elbow, initial encounter     2/23/2021  3:18 AM [place] Sartorius Add [M25 562] Acute pain of left knee     2/23/2021  3:18 AM [place] Sartorius Add [S80 02XA] Traumatic ecchymosis of left knee, initial encounter     2/23/2021  3:18 AM Junito Sartorius Add [M25 552] Hip tenderness, left       ED Disposition     ED Disposition Condition Date/Time Comment    Admit Stable Tue Feb 23, 2021  4:51 AM Case was discussed with SHANON and the patient's admission status was agreed to be Admission Status: inpatient status to the service of Dr Radhika Lyman MD Documentation      Most Recent Value   Accepting Facility Name, [address] [place] [address] by [facility] and Unit #)  [phone]      RN Documentation      [facility] Name, [address] [place] [address] by [facility] and Unit #)  [phone]      Follow-up Information     Follow up With Specialties Details Why Contact Dustin Patterson MD Family Medicine Follow up in 1 week(s)  [address]  [id number] [phone]            Discharge Medication List as of 2/26/2021 12:13 PM      CONTINUE these medications which have NOT CHANGED    Details   losartan (COZAAR) 50 mg tablet TK 1 T PO QD, Historical Med      Multiple Vitamins-Minerals (CENTRUM SILVER ADULT 50+ PO) Take by mouth, Historical Med      omeprazole (PriLOSEC) 20 mg delayed release capsule Starting Tue 9/22/2020, Historical Med      traMADol (ULTRAM) 50 mg tablet Take 50 mg by mouth every 8 (eight) hours as needed, Starting Fri 9/25/2020, Historical Med           No discharge procedures on file  PDMP Review     None           ED Provider  Attending physically available and evaluated Maldonado Allegheny  I managed the patient along with the ED Attending      Electronically Signed by         Shi Yates MD  03/01/21 0762

## 2021-02-23 NOTE — PLAN OF CARE
Problem: PAIN - ADULT  Goal: Verbalizes/displays adequate comfort level or baseline comfort level  Description: Interventions:  - Encourage patient to monitor pain and request assistance  - Assess pain using appropriate pain scale  - Administer analgesics based on type and severity of pain and evaluate response  - Implement non-pharmacological measures as appropriate and evaluate response  - Consider cultural and social influences on pain and pain management  - Notify physician/advanced practitioner if interventions unsuccessful or patient reports new pain  Outcome: Progressing     Problem: SAFETY ADULT  Goal: Patient will remain free of falls  Description: INTERVENTIONS:  - Assess patient frequently for physical needs  -  Identify cognitive and physical deficits and behaviors that affect risk of falls    -  [place] fall precautions as indicated by assessment   - Educate patient/family on patient safety including physical limitations  - Instruct patient to call for assistance with activity based on assessment  - Modify environment to reduce risk of injury  - Consider OT/PT consult to assist with strengthening/mobility  Outcome: Progressing  Goal: Maintain or return to baseline ADL function  Description: INTERVENTIONS:  -  Assess patient's ability to carry out ADLs; assess patient's baseline for ADL function and identify physical deficits which impact ability to perform ADLs (bathing, care of mouth/teeth, toileting, grooming, dressing, etc )  - Assess/evaluate cause of self-care deficits   - Assess range of motion  - Assess patient's mobility; develop plan if impaired  - Assess patient's need for assistive devices and provide as appropriate  - Encourage maximum independence but intervene and supervise when necessary  - Involve family in performance of ADLs  - Assess for home care needs following discharge   - Consider OT consult to assist with ADL evaluation and planning for discharge  - Provide patient education as appropriate  Outcome: Progressing  Goal: Maintain or return mobility status to optimal level  Description: INTERVENTIONS:  - Assess patient's baseline mobility status (ambulation, transfers, stairs, etc )    - Identify cognitive and physical deficits and behaviors that affect mobility  - Identify mobility aids required to assist with transfers and/or ambulation (gait belt, sit-to-stand, lift, walker, cane, etc )  - [place] fall precautions as indicated by assessment  - Record patient progress and toleration of activity level on Mobility SBAR; progress patient to next Phase/Stage  - Instruct patient to call for assistance with activity based on assessment  - Consider rehabilitation consult to assist with strengthening/weightbearing, etc   Outcome: Progressing     Problem: DISCHARGE PLANNING  Goal: Discharge to home or other facility with appropriate resources  Description: INTERVENTIONS:  - Identify barriers to discharge w/patient and caregiver  - Arrange for needed discharge resources and transportation as appropriate  - Identify discharge learning needs (meds, wound care, etc )  - Arrange for interpretive services to assist at discharge as needed  - Refer to Case Management Department for coordinating discharge planning if the patient needs post-hospital services based on physician/advanced practitioner order or complex needs related to functional status, cognitive ability, or social support system  Outcome: Progressing     Problem: Knowledge Deficit  Goal: Patient/family/caregiver demonstrates understanding of disease process, treatment plan, medications, and discharge instructions  Description: Complete learning assessment and assess knowledge base    Interventions:  - Provide teaching at level of understanding  - Provide teaching via preferred learning methods  Outcome: Progressing     Problem: Potential for Falls  Goal: Patient will remain free of falls  Description: INTERVENTIONS:  - Assess patient frequently for physical needs  -  Identify cognitive and physical deficits and behaviors that affect risk of falls    -  [place] fall precautions as indicated by assessment   - Educate patient/family on patient safety including physical limitations  - Instruct patient to call for assistance with activity based on assessment  - Modify environment to reduce risk of injury  - Consider OT/PT consult to assist with strengthening/mobility  Outcome: Progressing     Problem: Prexisting or High Potential for Compromised Skin Integrity  Goal: Skin integrity is maintained or improved  Description: INTERVENTIONS:  - Identify patients at risk for skin breakdown  - Assess and monitor skin integrity  - Assess and monitor nutrition and hydration status  - Monitor labs   - Assess for incontinence   - Turn and reposition patient  - Assist with mobility/ambulation  - Relieve pressure over bony prominences  - Avoid friction and shearing  - Provide appropriate hygiene as needed including keeping skin clean and dry  - Evaluate need for skin moisturizer/barrier cream  - Collaborate with interdisciplinary team   - Patient/family teaching  - Consider wound care consult   Outcome: Progressing

## 2021-02-23 NOTE — H&P
H&P- Stewart Erickson 1/28/1921, 80 y o  male MRN: [id number]    Unit/Bed#: ED 28 Encounter: [id number]    Primary Care Provider: Sandi Figueroa MD   Date and time admitted to hospital: 2/23/2021  2:34 AM        * Ambulatory dysfunction  Assessment & Plan  Multiple falls at home  Incurring right elbow and shoulder discomfort with left hip discomfort  No fractures  Physical therapy consult regarding ADLs and safety; possible need for rehab  Case management consult  At these will get basic metabolic profile with CBC differential   Urine analysis  Currently on acetaminophen 650 every 6 hours as needed; Ultram 50 mg every 8 hours as needed; will also add lidocaine patches to the left hip  Gastroesophageal reflux disease without esophagitis  Assessment & Plan  Omeprazole 20 mg daily (changed to Protonix while in hospital)  Chronic midline thoracic back pain  Assessment & Plan  Patient takes Ultram for the back pain  50 mg every 8 hours as needed  Benign essential hypertension  Assessment & Plan  Continue Cozaar 50 mg daily  Multiple falls  Assessment & Plan  Physical therapy consult with case management consult  History of bladder cancer  Assessment & Plan  Currently in remission as per daughter  VTE Prophylaxis: Enoxaparin (Lovenox)  / sequential compression device   Code Status: No Order do not resuscitate level 3 as discussed with patient in front of daughter  POLST: There is no POLST form on file for this patient (pre-hospital)    Anticipated Length of Stay:  Patient will be admitted on an Inpatient basis with an anticipated length of stay of  greater than 2 midnights  Justification for Hospital Stay: Please see detailed plans noted above      Chief Complaint:     Multiple falls  History of Present Illness:  Stewart Erickson is a 80 y o  male who has a past medical history significant for hypertension, chronic back pain, gastroesophageal reflux disease and bladder cancer in remission status post radiation therapy  Patient comes in because of mechanical fall that is purely accidental   Patient currently lives alone although it daughter looks at his welfare  According to daughter he has had 2 mechanical falls today and he landed on his knees as well as landing on the left hip area but without any fracture seen  Patient has also fallen face down because his knees gave out and could not get up  So far he has a right elbow discomfort along with left hip discomfort and a right shoulder discomfort  Patient is placed in hospital for activities of daily living assessment and possibility of rehabilitation  The patient is currently comfortable unless palpated deeply at the left hip however not very tender to touch  There is no deformity  He also has a slight conjunctiva were group at the left eye which is after plastic surgery 3 years ago  Review of Systems:    Constitutional:  Denies fever or chills   Eyes:  Denies change in visual acuity   HENT:  Denies nasal congestion or sore throat   Respiratory:  Denies cough or shortness of breath   Cardiovascular:  Denies chest pain or edema   GI:  Denies abdominal pain, nausea, vomiting, bloody stools or diarrhea   :  Denies dysuria   Musculoskeletal:  Joint pain at the elbow right side, left hip, shoulder right  Midthoracic back pain  Integument:  Denies rash   Neurologic:  Denies headache, focal weakness or sensory changes   Endocrine:  Denies polyuria or polydipsia   Lymphatic:  Denies swollen glands   Psychiatric:  Denies depression or anxiety     Past Medical and Surgical History:   History reviewed  No pertinent past medical history    Past Surgical History:   Procedure Laterality Date    ANKLE ARTHROSCOPY      BLEPHAROPLASTY      GALLBLADDER SURGERY      KNEE ARTHROSCOPY         Meds/Allergies:  (Not in a hospital admission)    losartan (COZAAR) 50 mg tablet TK 1 T PO QD Rahul Solorio MD Reordered   Ordered as: losartan (COZAAR) tablet 50 mg - 50 mg, Oral, Daily, First dose on Tue 2/23/21 at 3557 Hold for systolic blood pressure less than (mmHg): 110   Multiple Vitamins-Minerals (CENTRUM SILVER ADULT 50+ PO) Take by mouth Alden Avilez MD Reordered   Ordered as: multivitamin-minerals (CENTRUM) tablet 1 tablet - 1 tablet, Oral, Daily, First dose on Tue 2/23/21 at 0900 **DISPOSE IN 8 GALLON BLACK CONTAINER**    omeprazole (PriLOSEC) 20 mg delayed release capsule  Alden Avilez MD Reordered   Ordered as: pantoprazole (PROTONIX) EC tablet 20 mg - 20 mg, Oral, Daily (early morning), First dose on Tue 2/23/21 at 0600 Swallow whole; do not crush, chew or split  LOOK ALIKE SOUND ALIKE MED    traMADol (ULTRAM) 50 mg tablet Take 50 mg by mouth every 8 (eight) hours as needed Alden Avilez MD Reordered   Ordered as: traMADol (ULTRAM) tablet 50 mg - 50 mg, Oral, Every 8 hours PRN, moderate pain, Starting Tue 2/23/21 at 0505 High Alert Medication  LOOK ALIKE SOUND ALIKE MED          Allergies: No Known Allergies  History:  Marital Status:    Occupation:  He used to work in the  [employer]  Patient Pre-hospital Living Situation:  Lives at home alone    Patient Pre-hospital Level of Mobility:  Ambulatory but tendency to fall  Patient Pre-hospital Diet Restrictions:  Regular diet  Substance Use History:   Social History     Substance and Sexual Activity   Alcohol Use Never    Frequency: Never     Social History     Tobacco Use   Smoking Status Former Smoker   Smokeless Tobacco Never Used     Social History     Substance and Sexual Activity   Drug Use Never       Family History:  Family History   Problem Relation Age of Onset    Cancer Family        Physical Exam:     Vitals:   Blood Pressure: 143/79 (02/23/21 0244)  Pulse: (!) 110 (02/23/21 0244)  Temperature: 98 8 °F (37 1 °C) (02/23/21 0245)  Respirations: 18 (02/23/21 0244)  SpO2: 95 % (02/23/21 0244)    Constitutional:  Well developed, well nourished, no acute distress, non-toxic appearance   Eyes:  PERRL, conjunctiva normal with note of a droop of the left eye lid  HENT:  Atraumatic, external ears normal, nose normal, oropharynx moist, no pharyngeal exudates  Neck- normal range of motion, no tenderness, supple   Respiratory:  No respiratory distress, normal breath sounds, no rales, no wheezing   Cardiovascular:  Normal rate, normal rhythm, no murmurs, no gallops, no rubs   GI:  Soft, nondistended, normal bowel sounds, nontender, no organomegaly, no mass, no rebound, no guarding   :  No costovertebral angle tenderness   Musculoskeletal:  No edema, discomfort more of the right shoulder, right elbow, left hip and midthoracic area no deformities  Back- no tenderness  Integument:  Well hydrated, no rash   Lymphatic:  No lymphadenopathy noted   Neurologic:  Alert &awake, communicative, CN 2-12 normal, normal motor function, normal sensory function, no focal deficits noted with no preferential movement  Psychiatric:  Speech and behavior appropriate for age  Patient is able to hold conversation however hard of hearing  Lab Results: I have personally reviewed pertinent reports  CBC differential with basic metabolic profile currently pending  Imaging: I have personally reviewed pertinent reports  Ct Head Without Contrast    Result Date: 2/23/2021  Narrative: CT BRAIN - WITHOUT CONTRAST INDICATION:   fall  COMPARISON:  None  TECHNIQUE:  CT examination of the brain was performed  In addition to axial images, sagittal and coronal 2D reformatted images were created and submitted for interpretation  Radiation dose length product (DLP) for this visit:  861 75 mGy-cm   This examination, like all CT scans performed in the [facility], was performed utilizing techniques to minimize radiation dose exposure, including the use of iterative  reconstruction and automated exposure control  IMAGE QUALITY:  Diagnostic   FINDINGS: PARENCHYMA: Decreased attenuation is noted in periventricular and subcortical white matter demonstrating an appearance that is statistically most likely to represent moderate microangiopathic change  No CT signs of acute infarction  No intracranial mass, mass effect or midline shift  No acute parenchymal hemorrhage  VENTRICLES AND EXTRA-AXIAL SPACES:  Normal for the patient's age  VISUALIZED ORBITS AND PARANASAL SINUSES:  Unremarkable  CALVARIUM AND EXTRACRANIAL SOFT TISSUES:  Normal      Impression: No acute intracranial abnormality  Microangiopathic changes  Workstation performed: [id number]         ** Please Note: Dragon 360 Dictation voice to text software was used in the creation of this document   **

## 2021-02-23 NOTE — PLAN OF CARE
Problem: PHYSICAL THERAPY ADULT  Goal: Performs mobility at highest level of function for planned discharge setting  See evaluation for individualized goals  Description: Treatment/Interventions: Functional transfer training, LE strengthening/ROM, Elevations, Therapeutic exercise, Endurance training, Gait training, Spoke to nursing, Spoke to case management, OT, Bed mobility  Equipment Recommended: Walker(pt owns Rollator with seat that he used at baseline)       See flowsheet documentation for full assessment, interventions and recommendations  Note: Prognosis: Good  Problem List: Decreased endurance, Impaired balance, Pain, Decreased strength, Decreased mobility, Decreased safety awareness  Assessment: Pt is a 80 y o male who presents to [facility] with ambulatory dysfunction after having 2 falls within the past 24 hours  Pt taken to ED by EMS on 2/23/2021  Imaging negative  Pt c/o R elbow, R shoulder, and L hip discomfort related to falls  Pt has a PMH that includes HTN, L TKA, GERD, bladder CA (remission) and chronic back pain that is treated with Tramadol  Active PT orders for functional mobility assessment and D/C planning  Pt was pleasant and cooperative throughout visit  Pt reports that he lives alone in a [facility] [address] with bed and bath on the 2nd floor and about 14 steps to the second floor with a landing after 7 steps  Pt states he was independent with ADLs at baseline but has assistance with IADLs including laundry  Pt does not drive  Pt reports using a Rollator with a seat around the house and has a wheelchair for longer distances  Other details of home setup can be found above  Pt required a Mod Ax2 for supine to sit transfer and needed verbal cues for hand placement as well as UB and LE management  Once seated EOB, pt was able to maintain balance without reports of dizziness  Pt required a Mod Ax1 for sit to stand transfer with RW and needed verbal cues for hand placement on RW and EOB for safety   Pt demonstrated shuffling gait and appeared apprehensive to take steps  Pt able to transfer with good control from stand to sit but still required a Min Ax1 for safety  After a quick break, pt was able to sit to stand transfer with a Min Ax1 and ambulated down the lockhart for about 20 feet with RW and chair follow before needing to sit down  Pt reported feeling slightly out of breath but VS stable  Pt was returned to room where he was left in chair with chair alarm on, call bell in reach and all other needs met  Other objective measures of assessment can be found above  At this time, pt would benefit from skilled home PT as long as patient has increased family support to assist him with ADLs/IADLs as pt is unsafe to live alone at this time  Spoke with CM about making these arrangements  If unable to receive family support, PT recommends post acute rehabilitation services  The patient's AM-PAC Basic Mobility Inpatient Short Form Raw Score is 11, Standardized Score is 30 25  A standardized score less than 42 9 suggests the patient may benefit from discharge to post-acute rehabilitation services  Please also refer to the recommendation of the Physical Therapist for safe discharge planning  At this time, pt is considered to be of high complexity secondary to risk for fal  Barriers to Discharge: Decreased caregiver support(Pt lives alone)     PT Discharge Recommendation: Post-Acute Rehabilitation Services(pending progress and family support)     PT - OK to Discharge: Yes(pending medical clearance)    See flowsheet documentation for full assessment

## 2021-02-23 NOTE — ASSESSMENT & PLAN NOTE
Multiple falls at home  Incurring right elbow and shoulder discomfort with left hip discomfort  No fractures  Physical therapy consult regarding ADLs and safety; possible need for rehab  Case management consult  At these will get basic metabolic profile with CBC differential   Urine analysis  Currently on acetaminophen 650 every 6 hours as needed; Ultram 50 mg every 8 hours as needed; will also add lidocaine patches to the left hip

## 2021-02-23 NOTE — PLAN OF CARE
Problem: OCCUPATIONAL THERAPY ADULT  Goal: Performs self-care activities at highest level of function for planned discharge setting  See evaluation for individualized goals  Description: Treatment Interventions: ADL retraining, Functional transfer training, UE strengthening/ROM, Endurance training, Cognitive reorientation, Patient/family training, Equipment evaluation/education, Compensatory technique education, Energy conservation, Activityengagement  Equipment Recommended: Bedside commode       See flowsheet documentation for full assessment, interventions and recommendations  Note: Limitation: Decreased ADL status, Decreased UE ROM, Decreased UE strength, Decreased endurance, Decreased self-care trans, Decreased high-level ADLs  Prognosis: Good  Assessment: Pt is a 80 y o  male who was [facility]23/2021 after multiple falls and now here with  Ambulatory dysfunction , multiple falls, HTN, chronic midline thoracic back pain, history of bladder cancer, ARF  Pt's problem list also includes PMH of   At baseline pt was completing I with ADL's/assitance with IADL's, +rollator in house, transport chair in community  Pt lives alone in a multilevel home with 2STE, bed/bathroom on 2nd, full bath in basement (no bathroom on first floor) Currently pt requires min a UB max a LB for overall ADLS and  for functional mobility/transfers  Pt currently presents with impairments in the following categories -steps to enter environment, limited home support, difficulty performing ADLS and difficulty performing IADLS  activity tolerance, endurance, standing balance/tolerance, UE strength, UE ROM and safety    These impairments, as well as pt's fatigue, pain, decreased caregiver support and risk for falls  limit pt's ability to safely engage in all baseline areas of occupation, includinggrooming, bathing, dressing, toileting, functional mobility/transfers, community mobility, social participation  and leisure activities  The patient's raw score on the AM-PAC Daily Activity inpatient short form is 18, standardized score is 38 66, greater than and less than 39 4  Patients at this level are likely to benefit from DC to post-acute rehabilitation services vs home pending increased family support  Please refer to the recommendation of the Occupational Therapist for safe DC planning  From OT standpoint, recommend STR vs home with increased family support, bedside commode if home upon D/C  OT will continue to follow to address the below stated goals       OT Discharge Recommendation: Post-Acute Rehabilitation Services(vs home with increase family support)  OT - OK to Discharge: No

## 2021-02-23 NOTE — ASSESSMENT & PLAN NOTE
Multiple falls at home  Incurring right elbow and shoulder discomfort with left hip discomfort  No fractures  Physical therapy consult regarding ADLs and safety; possible need for rehab  Will discuss with case management further planning and care  Follow-up on labs  Urine analysis  Currently on acetaminophen 650 every 6 hours as needed; Ultram 50 mg every 8 hours as needed; will also add lidocaine patches to the left hip

## 2021-02-23 NOTE — OCCUPATIONAL THERAPY NOTE
Occupational Therapy Evaluation     Patient Name: Yani Arshad  [id number]'[id number] Date: 2/23/2021  Problem List  Principal Problem:    Ambulatory dysfunction  Active Problems:    Multiple falls    Benign essential hypertension    Chronic midline thoracic back pain    Gastroesophageal reflux disease without esophagitis    History of bladder cancer    Acute renal failure (ARF) (HCC)    Past Medical History  History reviewed  No pertinent past medical history  Past Surgical History  Past Surgical History:   Procedure Laterality Date    ANKLE ARTHROSCOPY      BLEPHAROPLASTY      GALLBLADDER SURGERY      KNEE ARTHROSCOPY               02/23/21 1045   Note Type   Note type Evaluation   Restrictions/Precautions   Weight Bearing Precautions Per Order No   Other Precautions Telemetry; Fall Risk;Cognitive; Chair Alarm; Bed Alarm   Pain Assessment   Pain Assessment Tool Pain Assessment not indicated - pt denies pain   Pain Score No Pain   Home Living   Type of Home House   Home Layout Two level;Bed/bath upstairs  (14 Steps to bed/bathroom, basement bathroom with full flight stairs to access)   Bathroom Shower/Tub Walk-in shower   Bathroom Toilet Raised   Bathroom Equipment Shower chair   [address]; Other (Comment)  (rollator, transport chair)   Prior Function   Level of [place] Independent with ADLs and functional mobility; Needs assistance with IADLs   Lives With Alone   Receives Help From Family  (daughter)   ADL Assistance Independent   IADLs Needs assistance   Falls in the last 6 months 1 to 4   Vocational Retired   Lifestyle   Autonomy I with ADL's, assistance with IADL's (pt reports makes lt meals, microwaves dinners), +rollator with functional ambulation, (-) drives, daughter provides transportation   Reciprocal Relationships supportive daughter, assists with driving needs, laundry tasks     Service to Others retired from [employer] "going to [employer]"   Haroldo Henderson 19 (WDL) WDL   ADL   Eating Assistance 5  Supervision/Setup   Grooming Assistance 3  Moderate Assistance   UB Bathing Assistance 3  Moderate Assistance   UB Bathing Deficit Right arm   LB Bathing Assistance 2  Maximal Assistance   UB Dressing Assistance 2  Maximal Assistance   LB Dressing Assistance 1  Total Assistance   Toileting Assistance  1  Total Assistance   Bed Mobility   Supine to Sit 3  Moderate assistance   Additional items Assist x 2;HOB elevated, verbal cues to reach for bed rail to assist with trunk mangement   Sit to Supine Unable to assess   Transfers   Sit to Stand 3  Moderate assistance   Additional items Assist x 1, verbal cues for safety   Stand to Sit 4  Minimal assistance   Additional items Assist x 1   Stand pivot 4  Minimal assistance   Additional items Assist x 1   Additional Comments +RW   Functional Mobility   Functional Mobility 4  Minimal assistance   Additional Comments min a x 1 with RW short distance functional mobility, good vitals   Balance   Static Sitting Fair   Dynamic Sitting Fair -   Static Standing Fair -   Dynamic Standing Poor +   Ambulatory Poor +   Activity Tolerance   Activity Tolerance Patient tolerated treatment well   Medical Staff Made Aware PT Linda Power   Nurse Made Aware RN cleared pt for therapy   RUE Assessment   RUE Assessment X  (2-/5 shoulder strength 2* to pain from fall no acute abnormalities per work-up, 3+5 elbow, 4/5  strength )   LUE Assessment   LUE Assessment WFL   Hand Function   Gross Motor Coordination Functional   Fine Motor Coordination Functional   Cognition   Overall Cognitive Status WFL   Arousal/Participation Alert; Cooperative   Attention Within functional limits   Orientation Level Oriented X4   Memory Within functional limits   Following Commands Follows all commands and directions without difficulty   Comments Pt motivated for therapy,  Oriented x 4, good historian, memory, able to follow multiple directives     Assessment   Limitation Decreased ADL status; Decreased UE ROM; Decreased UE strength;Decreased endurance;Decreased self-care trans;Decreased high-level ADLs   Prognosis Good   Assessment Pt is a 80 y o  male who was admitted to [facility] on 2/23/2021 after multiple falls and now here with  Ambulatory dysfunction , multiple falls, HTN, chronic midline thoracic back pain, history of bladder cancer, ARF  Pt's problem list also includes PMH of   At baseline pt was completing I with ADL's/assitance with IADL's, +rollator in house, transport chair in community  Pt lives alone in a multilevel home with 2STE, bed/bathroom on 2nd, full bath in basement (no bathroom on first floor) Currently pt requires min a UB max a LB for overall ADLS and  for functional mobility/transfers  Pt currently presents with impairments in the following categories -steps to enter environment, limited home support, difficulty performing ADLS and difficulty performing IADLS  activity tolerance, endurance, standing balance/tolerance, UE strength, UE ROM and safety   These impairments, as well as pt's fatigue, pain, decreased caregiver support and risk for falls  limit pt's ability to safely engage in all baseline areas of occupation, includinggrooming, bathing, dressing, toileting, functional mobility/transfers, community mobility, social participation  and leisure activities  The patient's raw score on the AM-PAC Daily Activity inpatient short form is 18, standardized score is 38 66, greater than and less than 39 4  Patients at this level are likely to benefit from DC to post-acute rehabilitation services vs home pending increased family support  Please refer to the recommendation of the Occupational Therapist for safe DC planning  From OT standpoint, recommend STR vs home with increased family support, bedside commode if home upon D/C  OT will continue to follow to address the below stated goals     Goals   Patient Goals go home   LTG Time Frame 10-14   Long Term Goal #1 see goals below   Plan   Treatment Interventions ADL retraining;Functional transfer training;UE strengthening/ROM; Endurance training;Cognitive reorientation;Patient/family training;Equipment evaluation/education; Compensatory technique education; Energy conservation; Activityengagement   Goal Expiration Date 03/09/21   OT Frequency 3-5x/wk   Recommendation   OT Discharge Recommendation Post-Acute Rehabilitation Services  (vs home with increase family support)   Equipment Recommended No DME needs   Commode Type Standard   OT - OK to Discharge No   AM-PAC Daily Activity Inpatient   Lower Body Dressing 2   Bathing 2   Toileting 3   Upper Body Dressing 3   Grooming 4   Eating 4   Daily Activity Raw Score 18   Daily Activity Standardized Score (Calc for Raw Score >=11) 38 66   AM-PAC Applied Cognition Inpatient   Following a Speech/Presentation 3   Understanding Ordinary Conversation 4   Taking Medications 4   Remembering Where Things Are Placed or Put Away 4   Remembering List of 4-5 Errands 4   Taking Care of Complicated Tasks 3   Applied Cognition Raw Score 22   Applied Cognition Standardized Score 47 83      Occupational Therapy Goals:    *Mod I with bed mobility to engage in functional tasks  *Mod I Adl's after setup with use of AE PRN  *Mod I toileting and clothing management   *Mod I functional mobility and transfers to/from all surfaces with Fair + dynamic balance and safety for participation in dynamic adls and iadl tasks   *Demonstrate good carryover with safe use of RW during functional tasks   *Assess DME needs   *Increase activity tolerance to 25-30 minutes for participation in adls and enjoyable activities  Pt will independently identify and utilize 2-3 coping strategies to increase positive affect and promote overall well-being  *Demonstrate good carryover of pt/family education and training with good tolerance for increased safety and independence with ADL's/ADl's    *Pt will improve R UE ROM 1/2 MMT via AROM/AAROM/PROM in all planes as tolerated in order to participate in functional activities      Ash Osorio MOT, OTR/L

## 2021-02-24 PROCEDURE — 97110 THERAPEUTIC EXERCISES: CPT

## 2021-02-24 PROCEDURE — 99232 SBSQ HOSP IP/OBS MODERATE 35: CPT | Performed by: INTERNAL MEDICINE

## 2021-02-24 PROCEDURE — 97530 THERAPEUTIC ACTIVITIES: CPT

## 2021-02-24 RX ORDER — LIDOCAINE 50 MG/G
1 PATCH TOPICAL DAILY
Status: DISCONTINUED | OUTPATIENT
Start: 2021-02-24 | End: 2021-02-26 | Stop reason: HOSPADM

## 2021-02-24 RX ADMIN — LIDOCAINE 1 PATCH: 50 PATCH TOPICAL at 22:39

## 2021-02-24 RX ADMIN — LIDOCAINE 1 PATCH: 50 PATCH TOPICAL at 07:53

## 2021-02-24 RX ADMIN — ENOXAPARIN SODIUM 30 MG: 30 INJECTION SUBCUTANEOUS at 07:52

## 2021-02-24 RX ADMIN — Medication 1 TABLET: at 07:52

## 2021-02-24 RX ADMIN — PANTOPRAZOLE SODIUM 20 MG: 20 TABLET, DELAYED RELEASE ORAL at 05:15

## 2021-02-24 RX ADMIN — SODIUM CHLORIDE 40 ML/HR: 0.9 INJECTION, SOLUTION INTRAVENOUS at 19:43

## 2021-02-24 NOTE — PROGRESS NOTES
Progress Note Rebeca Hunt 1/28/1921, 80 y o  male MRN: [id number]    [facility] Encounter: [id number]    Primary Care Provider: Dominick Zamudio MD   Date and time admitted to hospital: 2/23/2021  2:34 AM        * Ambulatory dysfunction  Assessment & Plan  Multiple falls at home  Incurring right elbow and shoulder discomfort with left hip discomfort  No fractures  Physical therapy consult regarding ADLs and safety; will discuss with case management- post discharge including  Will discuss with case management further planning and care  Follow-up on labs  Urine analysis  Currently on acetaminophen 650 every 6 hours as needed; Ultram 50 mg every 8 hours as needed; will also add lidocaine patches to the left hip  Acute renal failure (ARF) (HCC)  Assessment & Plan  Gentle hydration  Monitor renal function  Baseline creatinine between 1 3 and 1 5    History of bladder cancer  Assessment & Plan  Currently in remission as per daughter  Gastroesophageal reflux disease without esophagitis  Assessment & Plan  Omeprazole 20 mg daily (changed to Protonix while in hospital)  Chronic midline thoracic back pain  Assessment & Plan  Patient takes Ultram for the back pain  50 mg every 8 hours as needed  Benign essential hypertension  Assessment & Plan  Hold Cozaar for now  Patient with elevated creatinine  Unclear baseline  Multiple falls  Assessment & Plan  Physical therapy consult with case management consult  VTE Pharmacologic Prophylaxis:   Pharmacologic: Enoxaparin (Lovenox)  Mechanical VTE Prophylaxis in Place: No    Patient Centered Rounds: I have performed bedside rounds with nursing staff today  Time Spent for Care: 15 minutes  More than 50% of total time spent on counseling and coordination of care as described above      Current Length of Stay: 1 day(s)    Current Patient Status: Inpatient   Certification Statement: The patient will continue to require additional inpatient hospital stay due to Need to monitor symptoms        Code Status: Level 3 - DNAR and DNI      Subjective:   No acute distress    Objective:     Vitals:   Temp (24hrs), Av 7 °F (36 5 °C), Min:97 4 °F (36 3 °C), Max:98 °F (36 7 °C)    Temp:  [97 4 °F (36 3 °C)-98 °F (36 7 °C)] 97 5 °F (36 4 °C)  HR:  [73-79] 77  Resp:  [16-17] 17  BP: (144-159)/() 159/103  SpO2:  [93 %-95 %] 93 %  Body mass index is 35 6 kg/m²  Input and Output Summary (last 24 hours): Intake/Output Summary (Last 24 hours) at 2021 1017  Last data filed at 2021 0721  Gross per 24 hour   Intake 1216 ml   Output 600 ml   Net 616 ml       Physical Exam:     Physical Exam  HENT:      Head: Normocephalic and atraumatic  Nose: Nose normal    Cardiovascular:      Rate and Rhythm: Normal rate and regular rhythm  Pulmonary:      Effort: Pulmonary effort is normal       Breath sounds: Normal breath sounds  Abdominal:      General: Abdomen is flat  Palpations: Abdomen is soft  Skin:     General: Skin is warm and dry  Neurological:      General: No focal deficit present  Mental Status: He is oriented to person, place, and time  Additional Data:     Labs:    Results from last 7 days   Lab Units 21  0525   WBC Thousand/uL 12 14*   HEMOGLOBIN g/dL 14 5   HEMATOCRIT % 45 4   PLATELETS Thousands/uL 358   NEUTROS PCT % 79*   LYMPHS PCT % 10*   MONOS PCT % 9   EOS PCT % 0     Results from last 7 days   Lab Units 21  0524   POTASSIUM mmol/L 4 8   CHLORIDE mmol/L 106   CO2 mmol/L 22   BUN mg/dL 38*   CREATININE mg/dL 1 71*   CALCIUM mg/dL 9 1           * I Have Reviewed All Lab Data Listed Above  * Additional Pertinent Lab Tests Reviewed:  All Labs Within Last 24 Hours Reviewed        Recent Cultures (last 7 days):           Last 24 Hours Medication List:   Current Facility-Administered Medications   Medication Dose Route Frequency Provider Last Rate    acetaminophen  650 mg Oral Q6H PRN Kiran Ca MD      aluminum-magnesium hydroxide-simethicone  15 mL Oral Q6H PRN Kiran Ca MD      docusate sodium  100 mg Oral BID PRN Kiran Ca MD      enoxaparin  30 mg Subcutaneous Daily Kiran Ca MD      lidocaine  1 patch Topical Daily Kiran Ca MD      multivitamin-minerals  1 tablet Oral Daily Kiran Ca MD      ondansetron  4 mg Intravenous Q6H PRN Kiran Ca MD      pantoprazole  20 mg Oral Early Morning Kiran Ca MD      sodium chloride  40 mL/hr Intravenous Continuous Keyonna Perez DO 40 mL/hr (02/23/21 1337)    traMADol  50 mg Oral Q8H PRN Kiran Ca MD          Today, Patient Was Seen By: Ghislaine Lawrence DO    ** Please Note: Dictation voice to text software may have been used in the creation of this document   **

## 2021-02-24 NOTE — ASSESSMENT & PLAN NOTE
Multiple falls at home  Incurring right elbow and shoulder discomfort with left hip discomfort  No fractures  Physical therapy consult regarding ADLs and safety; will discuss with case management- post discharge including  Will discuss with case management further planning and care  Follow-up on labs  Urine analysis  Currently on acetaminophen 650 every 6 hours as needed; Ultram 50 mg every 8 hours as needed; will also add lidocaine patches to the left hip

## 2021-02-24 NOTE — PHYSICAL THERAPY NOTE
Physical Therapy Treatment Note    Patient's Name: Dallas Stark  : 21 1615   PT Last Visit   PT Visit Date 21   Note Type   Note Type Treatment   Pain Assessment   Pain Assessment Tool Pain Assessment not indicated - pt denies pain   Pain Score No Pain   Restrictions/Precautions   Weight Bearing Precautions Per Order No   Other Precautions Chair Alarm; Fall Risk   General   Chart Reviewed Yes   Family/Caregiver Present No   Cognition   Overall Cognitive Status WFL   Arousal/Participation Alert; Cooperative   Orientation Level Oriented X4   Following Commands Follows all commands and directions without difficulty   Comments Pt pleasant and cooperative throughout    Subjective   Subjective "I'm so glad you came, I need to exercise "    Bed Mobility   Supine to Sit 3  Moderate assistance   Additional items Assist x 1;HOB elevated; Increased time required;Verbal cues   Additional Comments VC's for sequencing of bed mobility, modA for trunk control  Once oriented to COG, pt requires supervision to maintain sitting balance   Transfers   Sit to Stand 3  Moderate assistance   Additional items Assist x 1; Increased time required;Verbal cues   Stand to Sit 4  Minimal assistance   Additional items Assist x 1; Increased time required;Verbal cues   Additional Comments with RW, cues for hip/knee extension into standing   Ambulation/Elevation   Gait pattern Excessively slow; Short stride; Inconsistent alcides;Decreased foot clearance   Gait Assistance 4  Minimal assist   Additional items Assist x 1   Assistive Device Rolling walker   Distance 5 ft to chair, limited by fatigue, pt declined further mobility, requested UE shoulder exercise  Vitals stable, O2 sats 94% on RA     Stair Management Assistance Not tested   Balance   Static Sitting Fair   Dynamic Sitting Fair -   Static Standing Fair -   Dynamic Standing Poor +   Ambulatory Poor + Activity Tolerance   Activity Tolerance Patient limited by fatigue   Nurse Made Aware RN updated  Chair alarm engaged   Exercises   Knee AROM Long Arc Quad Sitting;10 reps;AROM; Bilateral   UE Exercise Sitting;Bilateral  (AAROM shld flx 2x10, shld abd x10, AROM elbow flx x10)   Marching Sitting;10 reps;AROM; Bilateral   Assessment   Prognosis Good   Problem List Decreased strength;Decreased endurance;Decreased mobility; Impaired balance;Decreased safety awareness   Assessment Patient with limited activity tolerance today, reports fatigue from poor sleep overnight and frequent ambulation to bathroom with nursing staff today  Patient requested assistance with upper extremity exercise given poor shoulder mobility, AAROM R shoulder with pain at 80 degrees of flexion limiting ROM  Pt reports improved joint mobility and functional reach following exercise  Pt very motivated to improve and participate, will continue attempts at further ambulation next session  Pt will benefit from continued skilled PT intervention during course of hospital stay to improve strength, endurance and balance to improve safety with functional mobility  Recommend IP rehab upon hospital D/C  Goals   Patient Goals to go home   STG Expiration Date 03/07/21   PT Treatment Day 1   Plan   Treatment/Interventions Functional transfer training;LE strengthening/ROM; Endurance training; Therapeutic exercise;Cognitive reorientation;Equipment eval/education;Patient/family training;Bed mobility;Gait training   Progress Progressing toward goals   PT Frequency Other (Comment)  (3-5x/week)   Recommendation   PT Discharge Recommendation Post-Acute Rehabilitation Services   Equipment Recommended Walker  (pt owns rollator)   PT - OK to Discharge Yes  (to IP rehab)   Isabel 8 in Bed Without Bedrails 3   Lying on Back to Sitting on Edge of Flat Bed 2   Moving Bed to Chair 2   Standing Up From Chair 2   Walk in Room 3   Climb 3-5 Stairs 1   Basic Mobility Inpatient Raw Score 13   Basic Mobility Standardized Score 33 99       Cloevr Vincent, PT, DPT

## 2021-02-24 NOTE — CASE MANAGEMENT
LOS 1 D No readmit    Pt sleeping: Cm called daughter Pankaj Parks to complete open    Pt lives alone in two story house 7 CHAU then landing then 10 steps to second floor where full bath and bedrooms are  Currently 2nd floor shower is being remodled by his son to be a walk in shower  There is a shower in the bathroom w/ a shower chair  Pt's first floor is kitchen LR, sitting room  Pt uses rollator and standard walker  (Family is looking into a stair glide)  Son goes to the patients home at 12 noon every day and sits and watches tV  With the patient  Daughter does laundry and cleans and runs errands and grocery shops  Hx of VNA does not remember which agency  Has hand  railings  Pcp Dr Ghada Mayes on [phone]  Pt loves to go [place] [employer] with family  Stephon recently did a drive by [employer] and took the patient to the casino to celebrate his 100th [place] [address]  Loves [employer] meals  [facility] STR at [facility]  No Hx MH or substance issues  Per daughter Pankaj Parks family cannot provide 24 hours support    CM will discuss STR with the patient in the morning  CM reviewed d/c planning process including the following: identifying help at home, patient preference for d/c planning needs, Discharge Lounge, [facility] Meds to Bed program, availability of treatment team to discuss questions or concerns patient and/or family may have regarding understanding medications and recognizing signs and symptoms once discharged  CM also encouraged patient to follow up with all recommended appointments after discharge  Patient advised of importance for patient and family to participate in managing patients medical well being

## 2021-02-24 NOTE — PLAN OF CARE
Problem: PHYSICAL THERAPY ADULT  Goal: Performs mobility at highest level of function for planned discharge setting  See evaluation for individualized goals  Description: Treatment/Interventions: Functional transfer training, LE strengthening/ROM, Elevations, Therapeutic exercise, Endurance training, Gait training, Spoke to nursing, Spoke to case management, OT, Bed mobility  Equipment Recommended: Walker(pt owns Rollator with seat that he used at baseline)       See flowsheet documentation for full assessment, interventions and recommendations  Outcome: Progressing  Note: Prognosis: Good  Problem List: Decreased strength, Decreased endurance, Decreased mobility, Impaired balance, Decreased safety awareness  Assessment: Patient with limited activity tolerance today, reports fatigue from poor sleep overnight and frequent ambulation to bathroom with nursing staff today  Patient requested assistance with upper extremity exercise given poor shoulder mobility, AAROM R shoulder with pain at 80 degrees of flexion limiting ROM  Pt reports improved joint mobility and functional reach following exercise  Pt very motivated to improve and participate, will continue attempts at further ambulation next session  Pt will benefit from continued skilled PT intervention during course of hospital stay to improve strength, endurance and balance to improve safety with functional mobility  Recommend IP rehab upon hospital D/C  Barriers to Discharge: Decreased caregiver support(Pt lives alone)     PT Discharge Recommendation: Post-Acute Rehabilitation Services     PT - OK to Discharge: Yes(to IP rehab)    See flowsheet documentation for full assessment

## 2021-02-24 NOTE — PLAN OF CARE
Problem: PAIN - ADULT  Goal: Verbalizes/displays adequate comfort level or baseline comfort level  Description: Interventions:  - Encourage patient to monitor pain and request assistance  - Assess pain using appropriate pain scale  - Administer analgesics based on type and severity of pain and evaluate response  - Implement non-pharmacological measures as appropriate and evaluate response  - Consider cultural and social influences on pain and pain management  - Notify physician/advanced practitioner if interventions unsuccessful or patient reports new pain  Outcome: Progressing     Problem: SAFETY ADULT  Goal: Patient will remain free of falls  Description: INTERVENTIONS:  - Assess patient frequently for physical needs  -  Identify cognitive and physical deficits and behaviors that affect risk of falls    -  [place] fall precautions as indicated by assessment   - Educate patient/family on patient safety including physical limitations  - Instruct patient to call for assistance with activity based on assessment  - Modify environment to reduce risk of injury  - Consider OT/PT consult to assist with strengthening/mobility  Outcome: Progressing  Goal: Maintain or return to baseline ADL function  Description: INTERVENTIONS:  -  Assess patient's ability to carry out ADLs; assess patient's baseline for ADL function and identify physical deficits which impact ability to perform ADLs (bathing, care of mouth/teeth, toileting, grooming, dressing, etc )  - Assess/evaluate cause of self-care deficits   - Assess range of motion  - Assess patient's mobility; develop plan if impaired  - Assess patient's need for assistive devices and provide as appropriate  - Encourage maximum independence but intervene and supervise when necessary  - Involve family in performance of ADLs  - Assess for home care needs following discharge   - Consider OT consult to assist with ADL evaluation and planning for discharge  - Provide patient education as appropriate  Outcome: Progressing  Goal: Maintain or return mobility status to optimal level  Description: INTERVENTIONS:  - Assess patient's baseline mobility status (ambulation, transfers, stairs, etc )    - Identify cognitive and physical deficits and behaviors that affect mobility  - Identify mobility aids required to assist with transfers and/or ambulation (gait belt, sit-to-stand, lift, walker, cane, etc )  - [place] fall precautions as indicated by assessment  - Record patient progress and toleration of activity level on Mobility SBAR; progress patient to next Phase/Stage  - Instruct patient to call for assistance with activity based on assessment  - Consider rehabilitation consult to assist with strengthening/weightbearing, etc   Outcome: Progressing     Problem: DISCHARGE PLANNING  Goal: Discharge to home or other facility with appropriate resources  Description: INTERVENTIONS:  - Identify barriers to discharge w/patient and caregiver  - Arrange for needed discharge resources and transportation as appropriate  - Identify discharge learning needs (meds, wound care, etc )  - Arrange for interpretive services to assist at discharge as needed  - Refer to Case Management Department for coordinating discharge planning if the patient needs post-hospital services based on physician/advanced practitioner order or complex needs related to functional status, cognitive ability, or social support system  Outcome: Progressing     Problem: Knowledge Deficit  Goal: Patient/family/caregiver demonstrates understanding of disease process, treatment plan, medications, and discharge instructions  Description: Complete learning assessment and assess knowledge base    Interventions:  - Provide teaching at level of understanding  - Provide teaching via preferred learning methods  Outcome: Progressing     Problem: Potential for Falls  Goal: Patient will remain free of falls  Description: INTERVENTIONS:  - Assess patient frequently for physical needs  -  Identify cognitive and physical deficits and behaviors that affect risk of falls    -  [place] fall precautions as indicated by assessment   - Educate patient/family on patient safety including physical limitations  - Instruct patient to call for assistance with activity based on assessment  - Modify environment to reduce risk of injury  - Consider OT/PT consult to assist with strengthening/mobility  Outcome: Progressing     Problem: Prexisting or High Potential for Compromised Skin Integrity  Goal: Skin integrity is maintained or improved  Description: INTERVENTIONS:  - Identify patients at risk for skin breakdown  - Assess and monitor skin integrity  - Assess and monitor nutrition and hydration status  - Monitor labs   - Assess for incontinence   - Turn and reposition patient  - Assist with mobility/ambulation  - Relieve pressure over bony prominences  - Avoid friction and shearing  - Provide appropriate hygiene as needed including keeping skin clean and dry  - Evaluate need for skin moisturizer/barrier cream  - Collaborate with interdisciplinary team   - Patient/family teaching  - Consider wound care consult   Outcome: Progressing

## 2021-02-25 LAB
ANION GAP SERPL CALCULATED.3IONS-SCNC: 5 MMOL/L (ref 4–13)
BASOPHILS # BLD AUTO: 0.08 THOUSANDS/ΜL (ref 0–0.1)
BASOPHILS NFR BLD AUTO: 1 % (ref 0–1)
BUN SERPL-MCNC: 28 MG/DL (ref 5–25)
CALCIUM SERPL-MCNC: 8.3 MG/DL (ref 8.3–10.1)
CHLORIDE SERPL-SCNC: 108 MMOL/L (ref 100–108)
CO2 SERPL-SCNC: 26 MMOL/L (ref 21–32)
CREAT SERPL-MCNC: 1.37 MG/DL (ref 0.6–1.3)
EOSINOPHIL # BLD AUTO: 0.22 THOUSAND/ΜL (ref 0–0.61)
EOSINOPHIL NFR BLD AUTO: 3 % (ref 0–6)
ERYTHROCYTE [DISTWIDTH] IN BLOOD BY AUTOMATED COUNT: 13.5 % (ref 11.6–15.1)
FLUAV RNA RESP QL NAA+PROBE: NEGATIVE
FLUBV RNA RESP QL NAA+PROBE: NEGATIVE
GFR SERPL CREATININE-BSD FRML MDRD: 42 ML/MIN/1.73SQ M
GLUCOSE SERPL-MCNC: 108 MG/DL (ref 65–140)
HCT VFR BLD AUTO: 38.9 % (ref 36.5–49.3)
HGB BLD-MCNC: 12.7 G/DL (ref 12–17)
IMM GRANULOCYTES # BLD AUTO: 0.04 THOUSAND/UL (ref 0–0.2)
IMM GRANULOCYTES NFR BLD AUTO: 1 % (ref 0–2)
LYMPHOCYTES # BLD AUTO: 0.97 THOUSANDS/ΜL (ref 0.6–4.47)
LYMPHOCYTES NFR BLD AUTO: 12 % (ref 14–44)
MCH RBC QN AUTO: 30 PG (ref 26.8–34.3)
MCHC RBC AUTO-ENTMCNC: 32.6 G/DL (ref 31.4–37.4)
MCV RBC AUTO: 92 FL (ref 82–98)
MONOCYTES # BLD AUTO: 0.79 THOUSAND/ΜL (ref 0.17–1.22)
MONOCYTES NFR BLD AUTO: 10 % (ref 4–12)
NEUTROPHILS # BLD AUTO: 5.99 THOUSANDS/ΜL (ref 1.85–7.62)
NEUTS SEG NFR BLD AUTO: 73 % (ref 43–75)
NRBC BLD AUTO-RTO: 0 /100 WBCS
PLATELET # BLD AUTO: 326 THOUSANDS/UL (ref 149–390)
PMV BLD AUTO: 9.8 FL (ref 8.9–12.7)
POTASSIUM SERPL-SCNC: 4.1 MMOL/L (ref 3.5–5.3)
RBC # BLD AUTO: 4.23 MILLION/UL (ref 3.88–5.62)
RSV RNA RESP QL NAA+PROBE: NEGATIVE
SARS-COV-2 RNA RESP QL NAA+PROBE: NEGATIVE
SODIUM SERPL-SCNC: 139 MMOL/L (ref 136–145)
WBC # BLD AUTO: 8.09 THOUSAND/UL (ref 4.31–10.16)

## 2021-02-25 PROCEDURE — 80048 BASIC METABOLIC PNL TOTAL CA: CPT | Performed by: INTERNAL MEDICINE

## 2021-02-25 PROCEDURE — 97530 THERAPEUTIC ACTIVITIES: CPT

## 2021-02-25 PROCEDURE — 99232 SBSQ HOSP IP/OBS MODERATE 35: CPT | Performed by: INTERNAL MEDICINE

## 2021-02-25 PROCEDURE — 85025 COMPLETE CBC W/AUTO DIFF WBC: CPT | Performed by: INTERNAL MEDICINE

## 2021-02-25 PROCEDURE — 97110 THERAPEUTIC EXERCISES: CPT

## 2021-02-25 PROCEDURE — 0241U HB NFCT DS VIR RESP RNA 4 TRGT: CPT | Performed by: INTERNAL MEDICINE

## 2021-02-25 RX ADMIN — PANTOPRAZOLE SODIUM 20 MG: 20 TABLET, DELAYED RELEASE ORAL at 06:00

## 2021-02-25 RX ADMIN — LIDOCAINE 1 PATCH: 50 PATCH TOPICAL at 21:52

## 2021-02-25 RX ADMIN — ENOXAPARIN SODIUM 30 MG: 30 INJECTION SUBCUTANEOUS at 10:24

## 2021-02-25 RX ADMIN — SODIUM CHLORIDE 40 ML/HR: 0.9 INJECTION, SOLUTION INTRAVENOUS at 20:28

## 2021-02-25 RX ADMIN — Medication 1 TABLET: at 10:25

## 2021-02-25 RX ADMIN — TRAMADOL HYDROCHLORIDE 50 MG: 50 TABLET, FILM COATED ORAL at 16:58

## 2021-02-25 NOTE — PLAN OF CARE
Problem: PAIN - ADULT  Goal: Verbalizes/displays adequate comfort level or baseline comfort level  Description: Interventions:  - Encourage patient to monitor pain and request assistance  - Assess pain using appropriate pain scale  - Administer analgesics based on type and severity of pain and evaluate response  - Implement non-pharmacological measures as appropriate and evaluate response  - Consider cultural and social influences on pain and pain management  - Notify physician/advanced practitioner if interventions unsuccessful or patient reports new pain  Outcome: Progressing     Problem: SAFETY ADULT  Goal: Patient will remain free of falls  Description: INTERVENTIONS:  - Assess patient frequently for physical needs  -  Identify cognitive and physical deficits and behaviors that affect risk of falls    -  [place] fall precautions as indicated by assessment   - Educate patient/family on patient safety including physical limitations  - Instruct patient to call for assistance with activity based on assessment  - Modify environment to reduce risk of injury  - Consider OT/PT consult to assist with strengthening/mobility  Outcome: Progressing  Goal: Maintain or return to baseline ADL function  Description: INTERVENTIONS:  -  Assess patient's ability to carry out ADLs; assess patient's baseline for ADL function and identify physical deficits which impact ability to perform ADLs (bathing, care of mouth/teeth, toileting, grooming, dressing, etc )  - Assess/evaluate cause of self-care deficits   - Assess range of motion  - Assess patient's mobility; develop plan if impaired  - Assess patient's need for assistive devices and provide as appropriate  - Encourage maximum independence but intervene and supervise when necessary  - Involve family in performance of ADLs  - Assess for home care needs following discharge   - Consider OT consult to assist with ADL evaluation and planning for discharge  - Provide patient education as appropriate  Outcome: Progressing  Goal: Maintain or return mobility status to optimal level  Description: INTERVENTIONS:  - Assess patient's baseline mobility status (ambulation, transfers, stairs, etc )    - Identify cognitive and physical deficits and behaviors that affect mobility  - Identify mobility aids required to assist with transfers and/or ambulation (gait belt, sit-to-stand, lift, walker, cane, etc )  - [place] fall precautions as indicated by assessment  - Record patient progress and toleration of activity level on Mobility SBAR; progress patient to next Phase/Stage  - Instruct patient to call for assistance with activity based on assessment  - Consider rehabilitation consult to assist with strengthening/weightbearing, etc   Outcome: Progressing     Problem: DISCHARGE PLANNING  Goal: Discharge to home or other facility with appropriate resources  Description: INTERVENTIONS:  - Identify barriers to discharge w/patient and caregiver  - Arrange for needed discharge resources and transportation as appropriate  - Identify discharge learning needs (meds, wound care, etc )  - Arrange for interpretive services to assist at discharge as needed  - Refer to Case Management Department for coordinating discharge planning if the patient needs post-hospital services based on physician/advanced practitioner order or complex needs related to functional status, cognitive ability, or social support system  Outcome: Progressing     Problem: Knowledge Deficit  Goal: Patient/family/caregiver demonstrates understanding of disease process, treatment plan, medications, and discharge instructions  Description: Complete learning assessment and assess knowledge base    Interventions:  - Provide teaching at level of understanding  - Provide teaching via preferred learning methods  Outcome: Progressing     Problem: Potential for Falls  Goal: Patient will remain free of falls  Description: INTERVENTIONS:  - Assess patient frequently for physical needs  -  Identify cognitive and physical deficits and behaviors that affect risk of falls    -  [place] fall precautions as indicated by assessment   - Educate patient/family on patient safety including physical limitations  - Instruct patient to call for assistance with activity based on assessment  - Modify environment to reduce risk of injury  - Consider OT/PT consult to assist with strengthening/mobility  Outcome: Progressing     Problem: Prexisting or High Potential for Compromised Skin Integrity  Goal: Skin integrity is maintained or improved  Description: INTERVENTIONS:  - Identify patients at risk for skin breakdown  - Assess and monitor skin integrity  - Assess and monitor nutrition and hydration status  - Monitor labs   - Assess for incontinence   - Turn and reposition patient  - Assist with mobility/ambulation  - Relieve pressure over bony prominences  - Avoid friction and shearing  - Provide appropriate hygiene as needed including keeping skin clean and dry  - Evaluate need for skin moisturizer/barrier cream  - Collaborate with interdisciplinary team   - Patient/family teaching  - Consider wound care consult   Outcome: Progressing

## 2021-02-25 NOTE — PROGRESS NOTES
Progress Note [place] 1921, Robert Kacie [age] y o  male MRN: [id number]    Unit/Bed#: [facility] 825-01 Encounter: [id number]    Primary Care Provider: Tyrese Booker MD   Date and time admitted to hospital: 2021  2:34 AM        * Ambulatory dysfunction  Assessment & Plan  Multiple falls at home  Incurring right elbow and shoulder discomfort with left hip discomfort  No fractures  Physical therapy consult regarding ADLs and safety; will discuss with case management- post discharge including  Will discuss with case management further planning and care  Follow-up on labs  Urine analysis  Currently on acetaminophen 650 every 6 hours as needed; Ultram 50 mg every 8 hours as needed; will also add lidocaine patches to the left hip  Acute renal failure (ARF) (HCC)  Assessment & Plan  Gentle hydration  Monitor renal function  Baseline creatinine between 1 3 and 1 5    History of bladder cancer  Assessment & Plan  Currently in remission as per daughter  Gastroesophageal reflux disease without esophagitis  Assessment & Plan  Omeprazole 20 mg daily (changed to Protonix while in hospital)  Chronic midline thoracic back pain  Assessment & Plan  Patient takes Ultram for the back pain  50 mg every 8 hours as needed  Benign essential hypertension  Assessment & Plan  Hold Cozaar for now  Patient with elevated creatinine  Unclear baseline  Multiple falls  Assessment & Plan  Physical therapy consult with case management consult  VTE Pharmacologic Prophylaxis:   Pharmacologic: Enoxaparin (Lovenox)  Mechanical VTE Prophylaxis in Place: No    Patient Centered Rounds: I have performed bedside rounds with nursing staff today  Time Spent for Care: 15 minutes  More than 50% of total time spent on counseling and coordination of care as described above      Current Length of Stay: 2 day(s)    Current Patient Status: Inpatient   Certification Statement: The patient will continue to require additional inpatient hospital stay due to Need to monitor symptoms        Code Status: Level 3 - DNAR and DNI      Subjective:   Patient comfortable    Objective:     Vitals:   Temp (24hrs), Av 1 °F (36 7 °C), Min:97 5 °F (36 4 °C), Max:98 7 °F (37 1 °C)    Temp:  [97 5 °F (36 4 °C)-98 7 °F (37 1 °C)] 98 7 °F (37 1 °C)  HR:  [82-93] 84  Resp:  [16-20] 16  BP: (153-159)/() 155/88  SpO2:  [94 %-99 %] 94 %  Body mass index is 36 44 kg/m²  Input and Output Summary (last 24 hours): Intake/Output Summary (Last 24 hours) at 2021 1031  Last data filed at 2021 0818  Gross per 24 hour   Intake 1348 ml   Output 200 ml   Net 1148 ml       Physical Exam:     Physical Exam  HENT:      Head: Normocephalic and atraumatic  Neck:      Musculoskeletal: Normal range of motion  Cardiovascular:      Rate and Rhythm: Normal rate and regular rhythm  Heart sounds: No murmur  Pulmonary:      Effort: Pulmonary effort is normal       Breath sounds: Normal breath sounds  Abdominal:      General: Abdomen is flat  There is no distension  Palpations: Abdomen is soft  There is no mass  Musculoskeletal: Normal range of motion  General: No swelling  Skin:     General: Skin is warm and dry  Neurological:      General: No focal deficit present  Mental Status: He is alert and oriented to person, place, and time  Additional Data:     Labs:    Results from last 7 days   Lab Units 21  0524   WBC Thousand/uL 8 09   HEMOGLOBIN g/dL 12 7   HEMATOCRIT % 38 9   PLATELETS Thousands/uL 326   NEUTROS PCT % 73   LYMPHS PCT % 12*   MONOS PCT % 10   EOS PCT % 3     Results from last 7 days   Lab Units 21  0524   POTASSIUM mmol/L 4 1   CHLORIDE mmol/L 108   CO2 mmol/L 26   BUN mg/dL 28*   CREATININE mg/dL 1 37*   CALCIUM mg/dL 8 3           * I Have Reviewed All Lab Data Listed Above  * Additional Pertinent Lab Tests Reviewed:  All Labs Within Last 24 Hours Reviewed      Recent Cultures (last 7 days):           Last 24 Hours Medication List:   Current Facility-Administered Medications   Medication Dose Route Frequency Provider Last Rate    acetaminophen  650 mg Oral Q6H PRN Charo Steve MD      aluminum-magnesium hydroxide-simethicone  15 mL Oral Q6H PRN Charo Steve MD      docusate sodium  100 mg Oral BID PRN Charo Steve MD      enoxaparin  30 mg Subcutaneous Daily Charo Steve MD      lidocaine  1 patch Topical Daily Charo Steve MD      lidocaine  1 patch Topical Daily Makenzie Barrett PA-C      multivitamin-minerals  1 tablet Oral Daily Charo Steve MD      ondansetron  4 mg Intravenous Q6H PRN Charo Steve MD      pantoprazole  20 mg Oral Early Morning Charo Steve MD      sodium chloride  40 mL/hr Intravenous Continuous Keyonna Perez DO 40 mL/hr (02/24/21 1943)    traMADol  50 mg Oral Q8H PRN Charo Steve MD          Today, Patient Was Seen By: Rodriguez Jauregui DO    ** Please Note: Dictation voice to text software may have been used in the creation of this document   **

## 2021-02-25 NOTE — CASE MANAGEMENT
A post acute care recommendation was made by your care team for STR  Discussed Freedom of Choice with patient  List of facilities given to patient via in person  patient aware the list is custom filtered for them by zip code location and that [facility] post acute providers are designated  Pt has Hx of STR at [facility]  He would like a referral there  He was given a list and will discuss it with his family    He does not want to go to [facility] b/c he does not like the location in [facility]

## 2021-02-25 NOTE — PLAN OF CARE
Problem: OCCUPATIONAL THERAPY ADULT  Goal: Performs self-care activities at highest level of function for planned discharge setting  See evaluation for individualized goals  Description: Treatment Interventions: ADL retraining, Functional transfer training, UE strengthening/ROM, Endurance training, Cognitive reorientation, Patient/family training, Equipment evaluation/education, Compensatory technique education, Energy conservation, Activityengagement  Equipment Recommended: Bedside commode       See flowsheet documentation for full assessment, interventions and recommendations  Outcome: Progressing  Note: Limitation: Decreased ADL status, Decreased UE ROM, Decreased UE strength, Decreased endurance, Decreased self-care trans, Decreased high-level ADLs  Prognosis: Good  Assessment: Treatment focused on bed mobility, sitting balance and tolerance, endurance training, functional transfers and mobility, therapeutic exercise and pt education  Pt demo improvements with all transfers and mobility with date, needing decreased physical assistance  Pt continues to require VCs with transfers and mobility for safety with RW and pacing  Pt tolerated sitting EOB for 5 minutes unsupported with F+ static balance  Pt completed AAROM exercises to RUE with focus on shoulder flex/ext and int/ext rotation to maximize functional ROM for carryover to ADLs and functional mobility  Pt became SOB after therapeutic exercises but O2 remained >90%  Pt was returned to supine at end of session with all needs met and call bell in reach  Pt limited by pain, weakness, impaired balance, and impaired functional mobility  Pt would continue to benefit from acute OT services while in the hospital to maximize independence and safety with ADLs and functional mobility in order for patient to safely discharge to the next level of care  A discharge recommendation of post acute rehabilitation services has been made  Proceed with POC        OT Discharge Recommendation: Post-Acute Rehabilitation Services  OT - OK to Discharge: Yes(to rehab)

## 2021-02-25 NOTE — CASE MANAGEMENT
W/c Guera grigsby with [place] for d/c tomorrow for 1230       Imm given and explained to his daughter

## 2021-02-25 NOTE — OCCUPATIONAL THERAPY NOTE
OccupationalTherapy Progress Note     Patient Name: Criselda England  TBZWH'M Date: 2/25/2021  Problem List  Principal Problem:    Ambulatory dysfunction  Active Problems:    Multiple falls    Benign essential hypertension    Chronic midline thoracic back pain    Gastroesophageal reflux disease without esophagitis    History of bladder cancer    Acute renal failure (ARF) ([facility] [address] )          02/25/21 0947   OT Last Visit   OT Visit Date 02/25/21   Note Type   Note Type Treatment   Restrictions/Precautions   Weight Bearing Precautions Per Order No   Other Precautions Bed Alarm; Fall Risk   Lifestyle   Autonomy I with ADL's, assistance with IADL's, +rollator with fucntional ambulation, (-) drives, daughter provides trasnportation   Reciprocal Relationships supportive daughter, assists with driving needs, laundry tasks   Service to Others retired from [employer] "going to [employer]"   Pain Assessment   Pain Assessment Tool 0-10   Pain Score 3   Pain Location/Orientation Orientation: Right;Location: Shoulder   Functional Standing Tolerance   Time ~2 minutes   Activity with mobility   Comments with RW   Bed Mobility   Supine to Sit 5  Supervision   Additional items HOB elevated; Bedrails; Increased time required;Verbal cues   Sit to Supine 4  Minimal assistance   Additional items HOB elevated; Bedrails; Increased time required;Verbal cues;LE management   Additional Comments VCs for body mechanics and sequencing   Transfers   Sit to Stand 4  Minimal assistance   Additional items Assist x 1; Increased time required;Verbal cues   Stand to Sit 4  Minimal assistance   Additional items Assist x 1; Increased time required;Verbal cues   Additional Comments transfers with RW - VCs for hand placement and safety   Functional Mobility   Functional Mobility 4  Minimal assistance   Additional Comments [facility] in room   Additional items Rolling walker   Therapeutic Exercise - ROM   UE-ROM Yes   ROM- Right Upper Extremities R Shoulder AAROM; Flexion; Extension;Horizontal ABduction; External rotation; Internal rotation   R Elbow AAROM;Elbow flexion;Elbow extension   R Position Seated   R Weight/Reps/Sets 1 set x10   RUE ROM Comment AAROM with VCs to maximize movement   ROM - Left Upper Extremities    L Shoulder AROM; Flexion; Extension;Horizontal ABduction; External rotation; Internal rotation   L Elbow AROM;Elbow flexion;Elbow extension   L Position Seated   L Weight/Reps/Sets 1 set x10   LUE ROM Comment AROM with LUE with VCs to maximize movement   Cognition   Arousal/Participation Alert; Cooperative   Attention Within functional limits   Orientation Level Oriented X4   Memory Within functional limits   Following Commands Follows all commands and directions without difficulty   Comments Pt motivated to participate in session this AM   Activity Tolerance   Activity Tolerance Patient tolerated treatment well   Medical Staff Made Aware RN gave clearance for OT treatment   Assessment   Assessment Treatment focused on bed mobility, sitting balance and tolerance, endurance training, functional transfers and mobility, therapeutic exercise and pt education  Pt demo improvements with all transfers and mobility with date, needing decreased physical assistance  Pt continues to require VCs with transfers and mobility for safety with RW and pacing  Pt tolerated sitting EOB for 5 minutes unsupported with F+ static balance  Pt completed AAROM exercises to RUE with focus on shoulder flex/ext and int/ext rotation to maximize functional ROM for carryover to ADLs and functional mobility  Pt became SOB after therapeutic exercises but O2 remained >90%  Pt was returned to supine at end of session with all needs met and call bell in reach  Pt limited by pain, weakness, impaired balance, and impaired functional mobility   Pt would continue to benefit from acute OT services while in the hospital to maximize independence and safety with ADLs and functional mobility in order for patient to safely discharge to the next level of care  A discharge recommendation of post acute rehabilitation services has been made  Proceed with POC  Plan   Treatment Interventions ADL retraining;Functional transfer training;UE strengthening/ROM; Endurance training;Patient/family training;Energy conservation; Activityengagement   Goal Expiration Date 03/09/21   OT Treatment Day 1   OT Frequency 3-5x/wk   Recommendation   OT Discharge Recommendation Post-Acute Rehabilitation Services   OT - OK to Discharge Yes  (to rehab)     Victor Hugo Villalpando MOT,OTR/L

## 2021-02-26 VITALS
DIASTOLIC BLOOD PRESSURE: 66 MMHG | RESPIRATION RATE: 20 BRPM | BODY MASS INDEX: 35.64 KG/M2 | HEIGHT: 64 IN | OXYGEN SATURATION: 94 % | WEIGHT: 208.78 LBS | HEART RATE: 77 BPM | SYSTOLIC BLOOD PRESSURE: 130 MMHG | TEMPERATURE: 98.6 F

## 2021-02-26 LAB
ANION GAP SERPL CALCULATED.3IONS-SCNC: 7 MMOL/L (ref 4–13)
BASOPHILS # BLD AUTO: 0.09 THOUSANDS/ΜL (ref 0–0.1)
BASOPHILS NFR BLD AUTO: 1 % (ref 0–1)
BUN SERPL-MCNC: 30 MG/DL (ref 5–25)
CALCIUM SERPL-MCNC: 8.6 MG/DL (ref 8.3–10.1)
CHLORIDE SERPL-SCNC: 107 MMOL/L (ref 100–108)
CO2 SERPL-SCNC: 24 MMOL/L (ref 21–32)
CREAT SERPL-MCNC: 1.5 MG/DL (ref 0.6–1.3)
EOSINOPHIL # BLD AUTO: 0.18 THOUSAND/ΜL (ref 0–0.61)
EOSINOPHIL NFR BLD AUTO: 2 % (ref 0–6)
ERYTHROCYTE [DISTWIDTH] IN BLOOD BY AUTOMATED COUNT: 13.4 % (ref 11.6–15.1)
GFR SERPL CREATININE-BSD FRML MDRD: 38 ML/MIN/1.73SQ M
GLUCOSE SERPL-MCNC: 102 MG/DL (ref 65–140)
HCT VFR BLD AUTO: 40.6 % (ref 36.5–49.3)
HGB BLD-MCNC: 13.4 G/DL (ref 12–17)
IMM GRANULOCYTES # BLD AUTO: 0.07 THOUSAND/UL (ref 0–0.2)
IMM GRANULOCYTES NFR BLD AUTO: 1 % (ref 0–2)
LYMPHOCYTES # BLD AUTO: 1.92 THOUSANDS/ΜL (ref 0.6–4.47)
LYMPHOCYTES NFR BLD AUTO: 21 % (ref 14–44)
MCH RBC QN AUTO: 29.9 PG (ref 26.8–34.3)
MCHC RBC AUTO-ENTMCNC: 33 G/DL (ref 31.4–37.4)
MCV RBC AUTO: 91 FL (ref 82–98)
MONOCYTES # BLD AUTO: 0.92 THOUSAND/ΜL (ref 0.17–1.22)
MONOCYTES NFR BLD AUTO: 10 % (ref 4–12)
NEUTROPHILS # BLD AUTO: 5.82 THOUSANDS/ΜL (ref 1.85–7.62)
NEUTS SEG NFR BLD AUTO: 65 % (ref 43–75)
NRBC BLD AUTO-RTO: 0 /100 WBCS
PLATELET # BLD AUTO: 366 THOUSANDS/UL (ref 149–390)
PMV BLD AUTO: 9.2 FL (ref 8.9–12.7)
POTASSIUM SERPL-SCNC: 4.4 MMOL/L (ref 3.5–5.3)
RBC # BLD AUTO: 4.48 MILLION/UL (ref 3.88–5.62)
SODIUM SERPL-SCNC: 138 MMOL/L (ref 136–145)
WBC # BLD AUTO: 9 THOUSAND/UL (ref 4.31–10.16)

## 2021-02-26 PROCEDURE — 85025 COMPLETE CBC W/AUTO DIFF WBC: CPT | Performed by: INTERNAL MEDICINE

## 2021-02-26 PROCEDURE — 80048 BASIC METABOLIC PNL TOTAL CA: CPT | Performed by: INTERNAL MEDICINE

## 2021-02-26 PROCEDURE — 99238 HOSP IP/OBS DSCHRG MGMT 30/<: CPT | Performed by: INTERNAL MEDICINE

## 2021-02-26 RX ADMIN — ENOXAPARIN SODIUM 30 MG: 30 INJECTION SUBCUTANEOUS at 09:12

## 2021-02-26 RX ADMIN — PANTOPRAZOLE SODIUM 20 MG: 20 TABLET, DELAYED RELEASE ORAL at 05:32

## 2021-02-26 RX ADMIN — LIDOCAINE 1 PATCH: 50 PATCH TOPICAL at 09:13

## 2021-02-26 RX ADMIN — Medication 1 TABLET: at 09:12

## 2021-02-26 NOTE — RESTORATIVE TECHNICIAN NOTE
Restorative Specialist Mobility Note       Activity: Ambulate in lockhart, Chair     Assistive Device: Front wheel walker

## 2021-02-26 NOTE — PLAN OF CARE
Problem: PAIN - ADULT  Goal: Verbalizes/displays adequate comfort level or baseline comfort level  Description: Interventions:  - Encourage patient to monitor pain and request assistance  - Assess pain using appropriate pain scale  - Administer analgesics based on type and severity of pain and evaluate response  - Implement non-pharmacological measures as appropriate and evaluate response  - Consider cultural and social influences on pain and pain management  - Notify physician/advanced practitioner if interventions unsuccessful or patient reports new pain  Outcome: Progressing     Problem: SAFETY ADULT  Goal: Patient will remain free of falls  Description: INTERVENTIONS:  - Assess patient frequently for physical needs  -  Identify cognitive and physical deficits and behaviors that affect risk of falls    -  [place] fall precautions as indicated by assessment   - Educate patient/family on patient safety including physical limitations  - Instruct patient to call for assistance with activity based on assessment  - Modify environment to reduce risk of injury  - Consider OT/PT consult to assist with strengthening/mobility  Outcome: Progressing  Goal: Maintain or return to baseline ADL function  Description: INTERVENTIONS:  -  Assess patient's ability to carry out ADLs; assess patient's baseline for ADL function and identify physical deficits which impact ability to perform ADLs (bathing, care of mouth/teeth, toileting, grooming, dressing, etc )  - Assess/evaluate cause of self-care deficits   - Assess range of motion  - Assess patient's mobility; develop plan if impaired  - Assess patient's need for assistive devices and provide as appropriate  - Encourage maximum independence but intervene and supervise when necessary  - Involve family in performance of ADLs  - Assess for home care needs following discharge   - Consider OT consult to assist with ADL evaluation and planning for discharge  - Provide patient education as appropriate  Outcome: Progressing  Goal: Maintain or return mobility status to optimal level  Description: INTERVENTIONS:  - Assess patient's baseline mobility status (ambulation, transfers, stairs, etc )    - Identify cognitive and physical deficits and behaviors that affect mobility  - Identify mobility aids required to assist with transfers and/or ambulation (gait belt, sit-to-stand, lift, walker, cane, etc )  - [place] fall precautions as indicated by assessment  - Record patient progress and toleration of activity level on Mobility SBAR; progress patient to next Phase/Stage  - Instruct patient to call for assistance with activity based on assessment  - Consider rehabilitation consult to assist with strengthening/weightbearing, etc   Outcome: Progressing     Problem: DISCHARGE PLANNING  Goal: Discharge to home or other facility with appropriate resources  Description: INTERVENTIONS:  - Identify barriers to discharge w/patient and caregiver  - Arrange for needed discharge resources and transportation as appropriate  - Identify discharge learning needs (meds, wound care, etc )  - Arrange for interpretive services to assist at discharge as needed  - Refer to Case Management Department for coordinating discharge planning if the patient needs post-hospital services based on physician/advanced practitioner order or complex needs related to functional status, cognitive ability, or social support system  Outcome: Progressing     Problem: Knowledge Deficit  Goal: Patient/family/caregiver demonstrates understanding of disease process, treatment plan, medications, and discharge instructions  Description: Complete learning assessment and assess knowledge base    Interventions:  - Provide teaching at level of understanding  - Provide teaching via preferred learning methods  Outcome: Progressing     Problem: Potential for Falls  Goal: Patient will remain free of falls  Description: INTERVENTIONS:  - Assess patient frequently for physical needs  -  Identify cognitive and physical deficits and behaviors that affect risk of falls    -  [place] fall precautions as indicated by assessment   - Educate patient/family on patient safety including physical limitations  - Instruct patient to call for assistance with activity based on assessment  - Modify environment to reduce risk of injury  - Consider OT/PT consult to assist with strengthening/mobility  Outcome: Progressing     Problem: Prexisting or High Potential for Compromised Skin Integrity  Goal: Skin integrity is maintained or improved  Description: INTERVENTIONS:  - Identify patients at risk for skin breakdown  - Assess and monitor skin integrity  - Assess and monitor nutrition and hydration status  - Monitor labs   - Assess for incontinence   - Turn and reposition patient  - Assist with mobility/ambulation  - Relieve pressure over bony prominences  - Avoid friction and shearing  - Provide appropriate hygiene as needed including keeping skin clean and dry  - Evaluate need for skin moisturizer/barrier cream  - Collaborate with interdisciplinary team   - Patient/family teaching  - Consider wound care consult   Outcome: Progressing

## 2021-02-26 NOTE — PLAN OF CARE
Problem: PAIN - ADULT  Goal: Verbalizes/displays adequate comfort level or baseline comfort level  Description: Interventions:  - Encourage patient to monitor pain and request assistance  - Assess pain using appropriate pain scale  - Administer analgesics based on type and severity of pain and evaluate response  - Implement non-pharmacological measures as appropriate and evaluate response  - Consider cultural and social influences on pain and pain management  - Notify physician/advanced practitioner if interventions unsuccessful or patient reports new pain  Outcome: Progressing     Problem: SAFETY ADULT  Goal: Patient will remain free of falls  Description: INTERVENTIONS:  - Assess patient frequently for physical needs  -  Identify cognitive and physical deficits and behaviors that affect risk of falls    -  [place] fall precautions as indicated by assessment   - Educate patient/family on patient safety including physical limitations  - Instruct patient to call for assistance with activity based on assessment  - Modify environment to reduce risk of injury  - Consider OT/PT consult to assist with strengthening/mobility  Outcome: Progressing  Goal: Maintain or return to baseline ADL function  Description: INTERVENTIONS:  -  Assess patient's ability to carry out ADLs; assess patient's baseline for ADL function and identify physical deficits which impact ability to perform ADLs (bathing, care of mouth/teeth, toileting, grooming, dressing, etc )  - Assess/evaluate cause of self-care deficits   - Assess range of motion  - Assess patient's mobility; develop plan if impaired  - Assess patient's need for assistive devices and provide as appropriate  - Encourage maximum independence but intervene and supervise when necessary  - Involve family in performance of ADLs  - Assess for home care needs following discharge   - Consider OT consult to assist with ADL evaluation and planning for discharge  - Provide patient education as appropriate  Outcome: Progressing  Goal: Maintain or return mobility status to optimal level  Description: INTERVENTIONS:  - Assess patient's baseline mobility status (ambulation, transfers, stairs, etc )    - Identify cognitive and physical deficits and behaviors that affect mobility  - Identify mobility aids required to assist with transfers and/or ambulation (gait belt, sit-to-stand, lift, walker, cane, etc )  - [place] fall precautions as indicated by assessment  - Record patient progress and toleration of activity level on Mobility SBAR; progress patient to next Phase/Stage  - Instruct patient to call for assistance with activity based on assessment  - Consider rehabilitation consult to assist with strengthening/weightbearing, etc   Outcome: Progressing     Problem: DISCHARGE PLANNING  Goal: Discharge to home or other facility with appropriate resources  Description: INTERVENTIONS:  - Identify barriers to discharge w/patient and caregiver  - Arrange for needed discharge resources and transportation as appropriate  - Identify discharge learning needs (meds, wound care, etc )  - Arrange for interpretive services to assist at discharge as needed  - Refer to Case Management Department for coordinating discharge planning if the patient needs post-hospital services based on physician/advanced practitioner order or complex needs related to functional status, cognitive ability, or social support system  Outcome: Progressing     Problem: Knowledge Deficit  Goal: Patient/family/caregiver demonstrates understanding of disease process, treatment plan, medications, and discharge instructions  Description: Complete learning assessment and assess knowledge base    Interventions:  - Provide teaching at level of understanding  - Provide teaching via preferred learning methods  Outcome: Progressing     Problem: Potential for Falls  Goal: Patient will remain free of falls  Description: INTERVENTIONS:  - Assess patient frequently for physical needs  -  Identify cognitive and physical deficits and behaviors that affect risk of falls    -  [place] fall precautions as indicated by assessment   - Educate patient/family on patient safety including physical limitations  - Instruct patient to call for assistance with activity based on assessment  - Modify environment to reduce risk of injury  - Consider OT/PT consult to assist with strengthening/mobility  Outcome: Progressing     Problem: Prexisting or High Potential for Compromised Skin Integrity  Goal: Skin integrity is maintained or improved  Description: INTERVENTIONS:  - Identify patients at risk for skin breakdown  - Assess and monitor skin integrity  - Assess and monitor nutrition and hydration status  - Monitor labs   - Assess for incontinence   - Turn and reposition patient  - Assist with mobility/ambulation  - Relieve pressure over bony prominences  - Avoid friction and shearing  - Provide appropriate hygiene as needed including keeping skin clean and dry  - Evaluate need for skin moisturizer/barrier cream  - Collaborate with interdisciplinary team   - Patient/family teaching  - Consider wound care consult   Outcome: Progressing

## 2021-02-26 NOTE — DISCHARGE SUMMARY
Discharge- Maldonado Bryan 1/28/1921, 80 y o  male MRN: [id number]    [facility] Encounter: [id number]    Primary Care Provider: Sander Herzog MD   Date and time admitted to hospital: 2/23/2021  2:34 AM        * Ambulatory dysfunction  Assessment & Plan  Multiple falls at home  Incurring right elbow and shoulder discomfort with left hip discomfort  No fractures  Physical therapy consult regarding ADLs and safety; will discuss with case management- post discharge including  Will discuss with case management further planning and care  Follow-up on labs  Urine analysis  Currently on acetaminophen 650 every 6 hours as needed; Ultram 50 mg every 8 hours as needed; will also add lidocaine patches to the left hip  Acute renal failure (ARF) ([facility])  Assessment & Plan  Gentle hydration  Monitor renal function  Baseline creatinine between 1 3 and 1 5    History of bladder cancer  Assessment & Plan  Currently in remission as per daughter  Gastroesophageal reflux disease without esophagitis  Assessment & Plan  Omeprazole 20 mg daily (changed to Protonix while in hospital)  Chronic midline thoracic back pain  Assessment & Plan  Patient takes Ultram for the back pain  50 mg every 8 hours as needed  Benign essential hypertension  Assessment & Plan  Hold Cozaar for now  Multiple falls  Assessment & Plan  Physical therapy consult with case management consult                  Resolved Problems  Date Reviewed: 2/23/2021    None          Admission Date:   Admission Orders (From admission, onward)     Ordered        02/23/21 0510  INPATIENT ADMISSION  Once                     Admitting Diagnosis: Hip tenderness, left [M25 552]  Abrasion of right elbow, initial encounter [S50 311A]  Elbow injury, right, initial encounter [S59 901A]  Ambulatory dysfunction [R26 2]  Acute pain of left knee [M25 562]  Traumatic ecchymosis of left knee, initial encounter [S80 02XA]  Unspecified multiple injuries, initial encounter [T07  XXXA]    HPI:  This is a very pleasant 8 year old gentleman past medical history of hypertension, chronic back pain gastroesophageal reflux disease history of bladder cancer in remission status post radiation therapy presents to the hospital reported mechanical fall  The patient points to the fall was accidental   Currently lives alone although his daughter does check on as well therapy  Patient presents with 2 mechanical falls  He presents with shoulder discomfort and difficulty with ambulating  X-ray shoulder is negative  X-ray elbow was negative  X-ray be negative  · Weakness/deconditioning the setting fall and mild acute renal failure  Patient was gently hydrated  PT OT  · Await rehab  · Tentative discharge to skilled nursing facility  · CKD-close to baseline  · Prior history of bladder cancer in remission now  · Hypertension  Procedures Performed:   Orders Placed This Encounter   Procedures    ED ECG Documentation Only         Condition at Discharge: fair         Discharge instructions/Information to patient and family:   See after visit summary for information provided to patient and family  Provisions for Follow-Up Care:  See after visit summary for information related to follow-up care and any pertinent home health orders  PCP: Mallika Carson MD    Disposition: Skilled nursing facility    Planned Readmission: No    Discharge Statement   I spent 35 minutes discharging the patient  This time was spent on the day of discharge  I had direct contact with the patient on the day of discharge  Additional documentation is required if more than 30 minutes were spent on discharge  Discharge Medications:  See after visit summary for reconciled discharge medications provided to patient and family

## 2021-03-01 ENCOUNTER — NURSING HOME VISIT (OUTPATIENT)
Dept: GERIATRICS | Facility: OTHER | Age: 86
End: 2021-03-01
Payer: MEDICARE

## 2021-03-01 VITALS
BODY MASS INDEX: 36.63 KG/M2 | WEIGHT: 213.4 LBS | HEART RATE: 50 BPM | SYSTOLIC BLOOD PRESSURE: 99 MMHG | DIASTOLIC BLOOD PRESSURE: 67 MMHG | RESPIRATION RATE: 18 BRPM | TEMPERATURE: 97.7 F

## 2021-03-01 DIAGNOSIS — Z85.51 HISTORY OF BLADDER CANCER: ICD-10-CM

## 2021-03-01 DIAGNOSIS — R26.2 AMBULATORY DYSFUNCTION: ICD-10-CM

## 2021-03-01 DIAGNOSIS — G47.00 INSOMNIA, UNSPECIFIED TYPE: ICD-10-CM

## 2021-03-01 DIAGNOSIS — I10 BENIGN ESSENTIAL HYPERTENSION: Primary | ICD-10-CM

## 2021-03-01 DIAGNOSIS — K21.9 GASTROESOPHAGEAL REFLUX DISEASE WITHOUT ESOPHAGITIS: ICD-10-CM

## 2021-03-01 DIAGNOSIS — R05.3 PERSISTENT DRY COUGH: ICD-10-CM

## 2021-03-01 DIAGNOSIS — K59.00 CONSTIPATION, UNSPECIFIED CONSTIPATION TYPE: ICD-10-CM

## 2021-03-01 DIAGNOSIS — R29.6 MULTIPLE FALLS: ICD-10-CM

## 2021-03-01 DIAGNOSIS — G89.29 CHRONIC MIDLINE THORACIC BACK PAIN: ICD-10-CM

## 2021-03-01 DIAGNOSIS — M54.6 CHRONIC MIDLINE THORACIC BACK PAIN: ICD-10-CM

## 2021-03-01 DIAGNOSIS — N17.9 ACUTE RENAL FAILURE, UNSPECIFIED ACUTE RENAL FAILURE TYPE (HCC): ICD-10-CM

## 2021-03-01 PROCEDURE — 99309 SBSQ NF CARE MODERATE MDM 30: CPT | Performed by: NURSE PRACTITIONER

## 2021-03-01 RX ORDER — SENNOSIDES 8.6 MG
650 CAPSULE ORAL EVERY 8 HOURS
COMMUNITY
End: 2021-03-05

## 2021-03-01 RX ORDER — DOCUSATE SODIUM 100 MG/1
100 CAPSULE, LIQUID FILLED ORAL
COMMUNITY
Start: 2021-03-01 | End: 2021-03-05

## 2021-03-01 NOTE — ASSESSMENT & PLAN NOTE
· C/o not sleeping at night  · Tosses and turns all night  · 3mg melatonin at HS  · Monitor if pt sleeping at night

## 2021-03-01 NOTE — ASSESSMENT & PLAN NOTE
· C/o constipation x 2-3 days  · Colace at bedtime x 4 days starting 3/1/21  · Monitor for loose stool

## 2021-03-01 NOTE — ASSESSMENT & PLAN NOTE
· States he has had it since he got to [facility]  · States it keep him awake at night time  · Robitussin 10ml TID 9am, 3pm and 9pm

## 2021-03-01 NOTE — ASSESSMENT & PLAN NOTE
· Denied pain at time of assessment  · Arthritis pain ER 650mg Q8hrs for 7 days, last day is 3/5/21  · Monitor pain levels to see if tylenol ER has been relieving pain with out taking tramadol  · History of taking ultram for back pain PRN  · Monitor pain level

## 2021-03-01 NOTE — ASSESSMENT & PLAN NOTE
· Labs are improving  · Continue to avoid nephrotoxins as much as possible  · Monitor BMP for renal function

## 2021-03-01 NOTE — ASSESSMENT & PLAN NOTE
· Frequent falls  · Currently at [facility]  · Assist with ADL and IADL  · PT/OT eval and treat  · Fall precautions

## 2021-03-01 NOTE — PROGRESS NOTES
[facility]  [address]  [phone]  [facility]  STR Progress Note        NAME: Brian Doty  AGE: 80 y o  SEX: male    DATE OF ENCOUNTER: 3/1/2021    Assessment and Plan     1  Benign essential hypertension  Assessment & Plan:  · Controlled  · Continue with losartan  · Monitor BP      2  Multiple falls  Assessment & Plan:  · Will perform medication management for pain  · PT/OT eval and treat at [facility]      3  History of bladder cancer  Assessment & Plan:  · As per daughter pt is in remission  · Pt is followed by urology      4  Gastroesophageal reflux disease without esophagitis  Assessment & Plan:  · Controlled  · Continue omeprazole      5  Chronic midline thoracic back pain  Assessment & Plan:  · Denied pain at time of assessment  · Arthritis pain ER 650mg Q8hrs for 7 days, last day is 3/5/21  · Monitor pain levels to see if tylenol ER has been relieving pain with out taking tramadol  · History of taking ultram for back pain PRN  · Monitor pain level      6  Acute renal failure, unspecified acute renal failure type [facility])  Assessment & Plan:  · Labs are improving  · Continue to avoid nephrotoxins as much as possible  · Monitor BMP for renal function      7  Ambulatory dysfunction  Assessment & Plan:  · Frequent falls  · Currently at STR  · Assist with ADL and IADL  · PT/OT eval and treat  · Fall precautions      8  Constipation, unspecified constipation type  Assessment & Plan:  · C/o constipation x 2-3 days  · Colace at bedtime x 4 days starting 3/1/21  · Monitor for loose stool      9  Persistent dry cough  Assessment & Plan:  · States he has had it since he got to [facility]  · States it keep him awake at night time  · Robitussin 10ml TID 9am, 3pm and 9pm      10   Insomnia, unspecified type  Assessment & Plan:  · C/o not sleeping at night  · Tosses and turns all night  · 3mg melatonin at HS  · Monitor if pt sleeping at night          No orders of the defined types were placed in this encounter  History of Present Illness     Carlos Arias 80 y o  male seen for follow-up of care and treatment plan for ambulatory dysfunction doing well at [facility], HTN doing well with BP meds, hx bladder CA at baseline, GERD doing well with omeprazole, chronic pain controlled with pain meds, WOJCIECH labs improving, constipation controlled with colace, dry cough controlled with robitussin, insomnia controlled with melatonin     The following portions of the patient's history were reviewed and updated as appropriate: allergies, current medications, past family history, past medical history, past social history, past surgical history and problem list     Review of Systems     Review of Systems   Constitutional: Positive for fatigue  Negative for chills and fever  HENT: Negative for ear pain and sore throat  Eyes: Negative for pain and visual disturbance  Respiratory: Positive for cough and shortness of breath  Cardiovascular: Negative for chest pain and palpitations  Gastrointestinal: Positive for constipation  Negative for abdominal pain and vomiting  Genitourinary: Negative for dysuria and hematuria  Musculoskeletal: Positive for gait problem  Negative for arthralgias and back pain  Skin: Negative for color change and rash  Neurological: Positive for weakness  Negative for seizures and syncope  All other systems reviewed and are negative  Active Problem List     Patient Active Problem List   Diagnosis    Ambulatory dysfunction    Multiple falls    Benign essential hypertension    Chronic midline thoracic back pain    Gastroesophageal reflux disease without esophagitis    History of bladder cancer    Acute renal failure (ARF) (HCC)    Constipation    Persistent dry cough    Insomnia       Objective     BP 99/67   Pulse (!) 50   Temp 97 7 °F (36 5 °C)   Resp 18   Wt 96 8 kg (213 lb 6 4 oz)   BMI 36 63 kg/m²     Physical Exam  Vitals signs reviewed  Constitutional:       Appearance: He is well-developed  HENT:      Head: Normocephalic and atraumatic  Eyes:      Conjunctiva/sclera: Conjunctivae normal    Neck:      Musculoskeletal: Normal range of motion  Cardiovascular:      Rate and Rhythm: Normal rate and regular rhythm  Heart sounds: Normal heart sounds, S1 normal and S2 normal  No murmur  Pulmonary:      Effort: Pulmonary effort is normal  No respiratory distress  Breath sounds: Normal breath sounds  No wheezing  Abdominal:      General: Bowel sounds are normal  There is no distension  Palpations: Abdomen is soft  Tenderness: There is no abdominal tenderness  Musculoskeletal: Normal range of motion  Comments: Generalized weakness   Skin:     General: Skin is warm and dry  Neurological:      Mental Status: He is alert  Psychiatric:         Attention and Perception: Attention normal          Mood and Affect: Mood normal          Speech: Speech normal          Behavior: Behavior normal          Thought Content: Thought content normal          Cognition and Memory: Cognition normal          Judgment: Judgment normal      Pertinent Laboratory/Diagnostic Studies:  Hospital Labs Reviewed    Current Medications   Medication list reviewed and updated today  Please see paper chart for updated medication list      Neeru Georgia  3/1/98814:23 PM      Name: Fransisca Mckeon  : 1921  MRN: [id number]  DOS: 3/1/2021    Diagnoses:   Diagnosis ICD-10-CM Associated Orders   1  Benign essential hypertension  I10    2  Multiple falls  R29 6    3  History of bladder cancer  Z85 51    4  Gastroesophageal reflux disease without esophagitis  K21 9    5  Chronic midline thoracic back pain  M54 6     G89 29    6  Acute renal failure, unspecified acute renal failure type ([facility] [address] )  N17 9    7  Ambulatory dysfunction  R26 2    8  Constipation, unspecified constipation type  K59 00    9  Persistent dry cough  R05    10   Insomnia, unspecified type  G47 00

## 2021-03-02 ENCOUNTER — NURSING HOME VISIT (OUTPATIENT)
Dept: GERIATRICS | Facility: OTHER | Age: 86
End: 2021-03-02
Payer: MEDICARE

## 2021-03-02 DIAGNOSIS — R26.2 AMBULATORY DYSFUNCTION: ICD-10-CM

## 2021-03-02 DIAGNOSIS — R53.1 GENERALIZED WEAKNESS: ICD-10-CM

## 2021-03-02 DIAGNOSIS — K21.9 GASTROESOPHAGEAL REFLUX DISEASE WITHOUT ESOPHAGITIS: ICD-10-CM

## 2021-03-02 DIAGNOSIS — K59.00 CONSTIPATION, UNSPECIFIED CONSTIPATION TYPE: ICD-10-CM

## 2021-03-02 DIAGNOSIS — N17.9 ACUTE KIDNEY INJURY SUPERIMPOSED ON CHRONIC KIDNEY DISEASE (HCC): Primary | ICD-10-CM

## 2021-03-02 DIAGNOSIS — R06.00 DYSPNEA ON EXERTION: ICD-10-CM

## 2021-03-02 DIAGNOSIS — I10 HYPERTENSION, UNSPECIFIED TYPE: ICD-10-CM

## 2021-03-02 DIAGNOSIS — N18.9 ACUTE KIDNEY INJURY SUPERIMPOSED ON CHRONIC KIDNEY DISEASE (HCC): Primary | ICD-10-CM

## 2021-03-02 DIAGNOSIS — Z71.89 GOALS OF CARE, COUNSELING/DISCUSSION: ICD-10-CM

## 2021-03-02 PROBLEM — R06.09 DYSPNEA ON EXERTION: Status: ACTIVE | Noted: 2021-03-02

## 2021-03-02 PROCEDURE — 99306 1ST NF CARE HIGH MDM 50: CPT | Performed by: INTERNAL MEDICINE

## 2021-03-02 RX ORDER — AMOXICILLIN 250 MG
2 CAPSULE ORAL
COMMUNITY

## 2021-03-02 NOTE — ASSESSMENT & PLAN NOTE
· Significant and acute change in his status   · History and physical exam are unremarkable  · Will order laboratory testing and chest x-ray, especially with report of difficulty swallowing a waffle with breakfast earlier  · As discussed with patient and son, if testing is unremarkable will refer him to the emergency room for further evaluation and management to rule out occult cardiac or pulmonary disease  · Will continue to monitor for change in condition

## 2021-03-02 NOTE — ASSESSMENT & PLAN NOTE
· Secondary to his acute on chronic medical condition   · Hospital records show that he uses a Rollator and walker, historically  · I will order PT / OT evaluation and treatment to assist him in returning to his prior level function  · Will continue with monitoring for change in condition   · Will follow up with PCP upon discharge

## 2021-03-02 NOTE — ASSESSMENT & PLAN NOTE
·  Status post treatment during his acute hospital stay   · He will be admitted to the rehabilitation facility on the acute care pathway  · Will continue with hydration and BP management   · Will continue with avoidance of nephrotoxic medications / supplements   · I will order PT/ OT evaluation and treatment to assist him in returning to his prior level of functioning   · Will continue with monitoring for change in condition

## 2021-03-02 NOTE — ASSESSMENT & PLAN NOTE
· Endorses using an enema 1-2 times weekly prior to his admission   · I will add senna to his medication regimen  · Will continue with monitoring for change in condition  · Will have him follow-up with his PCP upon discharge

## 2021-03-02 NOTE — PROGRESS NOTES
[address]  [address] [place] [address]  Facility: [facility]  31       HISTORY AND PHYSICAL      NAME: Erlinda Spencer  AGE: 80 y o  SEX: male    DATE OF ENCOUNTER: 3/2/2021    Code status:  DNR    Assessment and Plan     1  Acute kidney injury superimposed on chronic kidney disease ([address] )  Assessment & Plan:  ·  Status post treatment during his acute hospital stay   · He will be admitted to the rehabilitation facility on the acute care pathway  · Will continue with hydration and BP management   · Will continue with avoidance of nephrotoxic medications / supplements   · I will order PT/ OT evaluation and treatment to assist him in returning to his prior level of functioning   · Will continue with monitoring for change in condition      2  Ambulatory dysfunction  Assessment & Plan:  · Secondary to his acute on chronic medical condition   · Hospital records show that he uses a Rollator and walker, historically  · I will order PT / OT evaluation and treatment to assist him in returning to his prior level function  · Will continue with monitoring for change in condition   · Will follow up with PCP upon discharge      3  Dyspnea on exertion  Assessment & Plan:  · Significant and acute change in his status   · History and physical exam are unremarkable  · Will order laboratory testing and chest x-ray, especially with report of difficulty swallowing a waffle with breakfast earlier  · As discussed with patient and son, if testing is unremarkable will refer him to the emergency room for further evaluation and management to rule out occult cardiac or pulmonary disease  · Will continue to monitor for change in condition      4   Generalized weakness  Assessment & Plan:  · Acute and significant symptom with change in functional status  · History and physical exam were unremarkable   · Will order laboratory testing and chest x-ray as initial evaluation   · As discussed with patient and son, will refer to emergency room for further evaluation and management pending results   · Will continue to monitor for change in condition      5  Hypertension, unspecified type  Assessment & Plan:  · He is doing well with losartan  · Will continue with his outpatient regimen  · Will continue with clinical and periodic laboratory monitoring for change in condition   · He will follow up with his PCP upon discharge      6  Constipation, unspecified constipation type  Assessment & Plan:  · Endorses using an enema 1-2 times weekly prior to his admission   · I will add senna to his medication regimen  · Will continue with monitoring for change in condition  · Will have him follow-up with his PCP upon discharge      7  Gastroesophageal reflux disease without esophagitis  Assessment & Plan:  · Will continue with his outpatient omeprazole   · He will follow up with his PCP upon discharge      8  Goals of care, counseling/discussion  Assessment & Plan:  · As discussed with him during the visit, he wishes his resuscitation status to be "do not resuscitate "        All medications and routine orders were reviewed and updated as needed  Plan discussed with:  Patient, son Valerio Bales, and nursing staff  Chief Complaint     He is seen for a visit to perform a history and physical exam to be admitted to the rehabilitation facility  History of Present Illness     History is obtained from patient interview, review of electronic medical records, and phone interview of son, Valerio Bales  He is a pleasant 8 year old gentleman with the comorbidities of chronic kidney disease (baseline creatinine 1 3-1 5), BPH with lower urinary tract symptoms, history of bladder cancer, GERD, hypertension, spinal stenosis, status post left total knee replacement, and recent acute kidney injury who is seen for a visit to perform a history and physical exam to be admitted to the rehabilitation facility    He presented to the emergency room on February 23, 2021 after falling twice at home that day  He was found with acute kidney injury on his chronic kidney disease and generalized weakness  He was admitted to the acute care hospital from February 23, 2021 through February 26, 2021  He was treated for his acute kidney injury  PT/ OT evaluation during his hospitalization recommended a post acute care rehabilitation stay prior to returning home  He lives alone  He uses a Rollator and walker  He has a 2 story home with 7 stairs to enter and 10 stairs to the 2nd floor  Per case management note, his son visits every afternoon  Today, he endorses feeling generally weak  He reports shortness of breath with minimal exertion  He states the symptoms have been coming on for a day or 2  He reports that he was able to ambulate to the nurse's station and back without difficulty during the 1st and 2nd day that he was admitted to the rehabilitation facility  Other than feeling generally weak, he is unable to describe the symptom further  He denies fever and chills  He denies myalgias  He has no headaches  He has an occasional dry cough  He does note that he had difficulty swallowing his waffle for breakfast and had to clear his throat by coughing  He denies chest pain  He reports chronic difficulty with sleeping  He denies GI symptoms  He denies swelling in his legs  His rapid screening test for the COVID-19 for infection was negative on March 1, 2021      HISTORY:  Past Medical History:   Diagnosis Date    BPH with obstruction/lower urinary tract symptoms     Chronic kidney disease     GERD (gastroesophageal reflux disease)     History of bladder cancer     Hypertension     Spinal stenosis      Past Surgical History:   Procedure Laterality Date    ANKLE ARTHROSCOPY      BLEPHAROPLASTY      GALLBLADDER SURGERY      KNEE ARTHROSCOPY       Family History   Problem Relation Age of Onset    Cancer Brother      Social History Socioeconomic History    Marital status:      Spouse name: Not on file    Number of children: Not on file    Years of education: Not on file    Highest education level: Not on file   Occupational History    Not on file   Social Needs    Financial resource strain: Not on file    Food insecurity     Worry: Not on file     Inability: Not on file    Transportation needs     Medical: Not on file     Non-medical: Not on file   Tobacco Use    Smoking status: Former Smoker    Smokeless tobacco: Never Used   Substance and Sexual Activity    Alcohol use: Never     Frequency: Never    Drug use: Never    Sexual activity: Not on file   Lifestyle    Physical activity     Days per week: Not on file     Minutes per session: Not on file    Stress: Not on file   Relationships    Social connections     Talks on phone: Not on file     Gets together: Not on file     Attends [employer] service: Not on file     Active member of club or organization: Not on file     Attends meetings of clubs or organizations: Not on file     Relationship status: Not on file    Intimate partner violence     Fear of current or ex partner: Not on file     Emotionally abused: Not on file     Physically abused: Not on file     Forced sexual activity: Not on file   Other Topics Concern    Not on file   Social History Narrative    Not on file       Allergies:  No Known Allergies    Review of Systems     Review of Systems   Constitutional: Positive for fatigue  Negative for fever  HENT: Positive for hearing loss (Slight  Does not have hearing aids)  Negative for dental problem ( full dentures)  Trouble swallowing:  see HPI  Respiratory: Positive for cough ( see HPI) and shortness of breath ( see HPI)  Cardiovascular: Negative for chest pain and leg swelling  Gastrointestinal: Positive for constipation ( historically, gives himself an enema 1-2 times weekly)  Negative for abdominal pain and diarrhea     Genitourinary: Positive for frequency  Negative for difficulty urinating and dysuria  Musculoskeletal: Positive for arthralgias (Right shoulder) and back pain  Skin: Negative for rash  Neurological: Negative for headaches  Hematological: Does not bruise/bleed easily  Psychiatric/Behavioral: Positive for sleep disturbance  Negative for dysphoric mood  The patient is not nervous/anxious  Medications and orders     All medications reviewed and updated in Nursing Home EMR  Objective     Vitals:  Weight 113 4 lb, temperature 97 2° F, pulse 82, respiratory rate 22, blood pressure 149/77, pulse oximetry 92% on room air  Physical Exam  Vitals signs reviewed  Constitutional:       General: He is awake  He is not in acute distress  Appearance: He is well-developed  He is obese  He is not ill-appearing, toxic-appearing or diaphoretic  Comments: He appears comfortable sitting in his bedside chair, younger than his stated age, and frail  HENT:      Head: Normocephalic and atraumatic  Nose: Nose normal    Eyes:      General: No scleral icterus  Extraocular Movements: Extraocular movements intact  Conjunctiva/sclera: Conjunctivae normal    Cardiovascular:      Rate and Rhythm: Normal rate and regular rhythm  Heart sounds: Normal heart sounds  No murmur  No friction rub  No gallop  Comments: There is no pretibial or pedal edema, bilaterally  Pulmonary:      Effort: Pulmonary effort is normal  No respiratory distress  Breath sounds: Normal breath sounds  No stridor  No wheezing, rhonchi or rales  Abdominal:      General: Abdomen is flat  There is no distension  Palpations: Abdomen is soft  There is no mass  Tenderness: There is no abdominal tenderness  There is no guarding or rebound  Musculoskeletal:         General: No deformity  Right lower leg: No edema  Left lower leg: No edema        Comments: Decreased active range of motion of bilateral shoulders right greater than left  Mild decreased range of motion secondary to discomfort right shoulder  Skin:     General: Skin is dry  Findings: No bruising or rash  Neurological:      Mental Status: He is alert and oriented to person, place, and time  Comments: Alert and oriented x4  Psychiatric:         Mood and Affect: Mood normal          Behavior: Behavior normal  Behavior is cooperative  Pertinent Laboratory/Diagnostic Studies: The following labs/studies were reviewed please see chart or hospital paperwork for details  March 2, 2021:     CBC with diff:  WBC count 6 7, hemoglobin 12 6, hematocrit 37 9, platelet count 4 2975     CMP: Sodium 139, potassium 4 8, BUN 28, creatinine 1 44, fasting blood sugar 136, LFTs within normal limits except alkaline phosphatase to 154, albumin 2 5, total protein 6, AST 44    Lab Results   Component Value Date    WBC 9 00 02/26/2021    HGB 13 4 02/26/2021    HCT 40 6 02/26/2021    MCV 91 02/26/2021     02/26/2021       Lab Results   Component Value Date    SODIUM 138 02/26/2021    K 4 4 02/26/2021     02/26/2021    CO2 24 02/26/2021    BUN 30 (H) 02/26/2021    CREATININE 1 50 (H) 02/26/2021    GLUC 102 02/26/2021    CALCIUM 8 6 02/26/2021 February 23, 2021:     Right shoulder x-ray two view:     FINDINGS:     There is no acute fracture or dislocation      No significant degenerative changes      No lytic or blastic osseous lesion      Soft tissues are unremarkable      IMPRESSION:     No acute osseous abnormality       - His admission order summary was reviewed and signed  Treatment plan reviewed with patient, son Anjel Garrison, and nursing staff      JB Woodson   3/2/2021 3:11 PM

## 2021-03-02 NOTE — ASSESSMENT & PLAN NOTE
· As discussed with him during the visit, he wishes his resuscitation status to be "do not resuscitate "

## 2021-03-02 NOTE — ASSESSMENT & PLAN NOTE
· He is doing well with losartan  · Will continue with his outpatient regimen  · Will continue with clinical and periodic laboratory monitoring for change in condition   · He will follow up with his PCP upon discharge

## 2021-03-02 NOTE — ASSESSMENT & PLAN NOTE
· Acute and significant symptom with change in functional status  · History and physical exam were unremarkable   · Will order laboratory testing and chest x-ray as initial evaluation   · As discussed with patient and son, will refer to emergency room for further evaluation and management pending results   · Will continue to monitor for change in condition

## 2021-03-04 ENCOUNTER — NURSING HOME VISIT (OUTPATIENT)
Dept: GERIATRICS | Facility: OTHER | Age: 86
End: 2021-03-04
Payer: MEDICARE

## 2021-03-04 DIAGNOSIS — R05.3 PERSISTENT DRY COUGH: Primary | ICD-10-CM

## 2021-03-04 DIAGNOSIS — R06.00 DYSPNEA ON EXERTION: ICD-10-CM

## 2021-03-04 PROCEDURE — 99309 SBSQ NF CARE MODERATE MDM 30: CPT | Performed by: NURSE PRACTITIONER

## 2021-03-05 VITALS
TEMPERATURE: 97.6 F | SYSTOLIC BLOOD PRESSURE: 135 MMHG | DIASTOLIC BLOOD PRESSURE: 83 MMHG | WEIGHT: 213 LBS | HEART RATE: 88 BPM | RESPIRATION RATE: 20 BRPM | BODY MASS INDEX: 36.56 KG/M2

## 2021-03-05 NOTE — ASSESSMENT & PLAN NOTE
· Xray results showed pneumonia  · Doxycycline ordered, last dose 2/9/21  · Continue to monitor  · EKG done to monitor QT  · Pt states he is improving and feels stronger with less fatigue  · Decreased shortness of breath  · Continue to monitor for changes

## 2021-03-05 NOTE — PROGRESS NOTES
[facility]  [address]  [phone]  [facility]  STR Acute Note        NAME: Beti Diez  AGE: 80 y o  SEX: male    DATE OF ENCOUNTER: 3/4/21    Assessment and Plan     1  Persistent dry cough  Assessment & Plan:  · Improving  · Pt on doxycycline for PNA  · Robitussin for cough  · Monitor for any changes      2  Dyspnea on exertion  Assessment & Plan:  · Xray results showed pneumonia  · Doxycycline ordered, last dose 2/9/21  · Continue to monitor  · EKG done to monitor QT  · Pt states he is improving and feels stronger with less fatigue  · Decreased shortness of breath  · Continue to monitor for changes          No orders of the defined types were placed in this encounter  History of Present Illness     Carlos Arias 80 y o  male seen for follow-up of care and treatment plan for ambulatory dysfunction doing well at STR, dry cough controlled with robitussin, dyspnea on exertion doing well with PNA treatment     The following portions of the patient's history were reviewed and updated as appropriate: allergies, current medications, past family history, past medical history, past social history, past surgical history and problem list     Review of Systems     Review of Systems   Constitutional: Negative for chills and fever  HENT: Negative for ear pain and sore throat  Eyes: Negative for pain and visual disturbance  Respiratory: Positive for cough and shortness of breath  Negative for wheezing  Cardiovascular: Negative for chest pain, palpitations and leg swelling  Gastrointestinal: Negative for abdominal pain and vomiting  Genitourinary: Negative for dysuria and hematuria  Musculoskeletal: Positive for gait problem  Negative for arthralgias and back pain  Skin: Negative for color change and rash  Neurological: Positive for weakness  Negative for seizures and syncope  All other systems reviewed and are negative        Active Problem List Patient Active Problem List   Diagnosis    Ambulatory dysfunction    Multiple falls    Hypertension    Chronic midline thoracic back pain    Gastroesophageal reflux disease without esophagitis    History of bladder cancer    Acute kidney injury superimposed on chronic kidney disease (HCC)    Constipation    Persistent dry cough    Insomnia    Dyspnea on exertion    Generalized weakness    Goals of care, counseling/discussion       Objective     /83   Pulse 88   Temp 97 6 °F (36 4 °C)   Resp 20   Wt 96 6 kg (213 lb)   BMI 36 56 kg/m²     Physical Exam  Vitals signs reviewed  Constitutional:       Appearance: He is well-developed  HENT:      Head: Normocephalic and atraumatic  Eyes:      Conjunctiva/sclera: Conjunctivae normal    Neck:      Musculoskeletal: Normal range of motion  Cardiovascular:      Rate and Rhythm: Normal rate and regular rhythm  Heart sounds: Normal heart sounds, S1 normal and S2 normal  No murmur  Pulmonary:      Effort: Pulmonary effort is normal  No respiratory distress  Breath sounds: Examination of the right-lower field reveals decreased breath sounds  Examination of the left-lower field reveals decreased breath sounds  Decreased breath sounds present  No wheezing  Abdominal:      General: Bowel sounds are normal  There is no distension  Palpations: Abdomen is soft  Tenderness: There is no abdominal tenderness  Musculoskeletal: Normal range of motion  Comments: Generalized weakness   Skin:     General: Skin is warm and dry  Neurological:      Mental Status: He is alert  Psychiatric:         Attention and Perception: Attention normal          Mood and Affect: Mood normal          Speech: Speech normal          Behavior: Behavior normal          Thought Content:  Thought content normal          Cognition and Memory: Cognition normal      Pertinent Laboratory/Diagnostic Studies:  [facility] Labs Reviewed    Current Medications Medication list reviewed and updated today  Please see paper chart for updated medication list      Leopold Chinchilla  3/5/94124:12 PM      Name: Erlinda Spencer  : 1921  MRN: [id number]  DOS: 3/4/21    Diagnoses:   Diagnosis ICD-10-CM Associated Orders   1  Persistent dry cough  R05    2   Dyspnea on exertion  R06 00

## 2021-03-08 ENCOUNTER — NURSING HOME VISIT (OUTPATIENT)
Dept: GERIATRICS | Facility: OTHER | Age: 86
End: 2021-03-08
Payer: MEDICARE

## 2021-03-08 VITALS
WEIGHT: 213 LBS | SYSTOLIC BLOOD PRESSURE: 113 MMHG | BODY MASS INDEX: 36.56 KG/M2 | DIASTOLIC BLOOD PRESSURE: 61 MMHG | HEART RATE: 69 BPM | RESPIRATION RATE: 20 BRPM | TEMPERATURE: 97.5 F

## 2021-03-08 DIAGNOSIS — R05.3 PERSISTENT DRY COUGH: Primary | ICD-10-CM

## 2021-03-08 DIAGNOSIS — R06.00 DYSPNEA ON EXERTION: ICD-10-CM

## 2021-03-08 DIAGNOSIS — R53.1 GENERALIZED WEAKNESS: ICD-10-CM

## 2021-03-08 PROCEDURE — 99309 SBSQ NF CARE MODERATE MDM 30: CPT | Performed by: NURSE PRACTITIONER

## 2021-03-09 NOTE — PROGRESS NOTES
[facility]  [address]  [phone]  [facility]  STR Acute Note        NAME: Brian Doty  AGE: 80 y o  SEX: male    DATE OF ENCOUNTER: 3/8/2021    Assessment and Plan     1  Persistent dry cough  Assessment & Plan:  · Pt states robitussin not working well especially at night  · Dc robitussin, tesselon pearls ordered TID  · Monitor for any relief      2  Generalized weakness  Assessment & Plan:  · Pt states he feels he is getting better but has fatigue due to recent PNA  · Assist with ADL and IADL  · Continue with PT/OT      3  Dyspnea on exertion  Assessment & Plan:  · Shortness of breath with exertion, chronic  · Continue to use incentive spirometer  · PT/OT          No orders of the defined types were placed in this encounter  History of Present Illness     Carlos Arias 80 y o  male seen for follow-up of care and treatment plan for ambulatory dysfunction doing well at STR, dry cough controlled with tesselon pearls, dyspnea on exertion improving     The following portions of the patient's history were reviewed and updated as appropriate: allergies, current medications, past family history, past medical history, past social history, past surgical history and problem list     Review of Systems     Review of Systems   Constitutional: Positive for activity change  Negative for chills and fever  HENT: Negative for ear pain and sore throat  Eyes: Negative for pain and visual disturbance  Respiratory: Positive for cough and shortness of breath (exertion)  Negative for wheezing  Cardiovascular: Positive for leg swelling  Negative for chest pain and palpitations  Gastrointestinal: Negative for abdominal pain and vomiting  Genitourinary: Negative for dysuria and hematuria  Musculoskeletal: Positive for gait problem  Negative for arthralgias and back pain  Skin: Negative for color change and rash  Neurological: Positive for weakness   Negative for seizures and syncope  All other systems reviewed and are negative  Active Problem List     Patient Active Problem List   Diagnosis    Ambulatory dysfunction    Multiple falls    Hypertension    Chronic midline thoracic back pain    Gastroesophageal reflux disease without esophagitis    History of bladder cancer    Acute kidney injury superimposed on chronic kidney disease (HCC)    Constipation    Persistent dry cough    Insomnia    Dyspnea on exertion    Generalized weakness    Goals of care, counseling/discussion       Objective     /61   Pulse 69   Temp 97 5 °F (36 4 °C)   Resp 20   Wt 96 6 kg (213 lb)   BMI 36 56 kg/m²     Physical Exam  Vitals signs reviewed  Constitutional:       Appearance: He is well-developed  HENT:      Head: Normocephalic and atraumatic  Eyes:      Conjunctiva/sclera: Conjunctivae normal    Neck:      Musculoskeletal: Normal range of motion  Cardiovascular:      Rate and Rhythm: Normal rate and regular rhythm  Heart sounds: Normal heart sounds, S1 normal and S2 normal  No murmur  Pulmonary:      Effort: Pulmonary effort is normal  No respiratory distress  Breath sounds: Examination of the right-lower field reveals decreased breath sounds  Examination of the left-lower field reveals decreased breath sounds  Decreased breath sounds present  No wheezing  Abdominal:      General: Bowel sounds are normal  There is no distension  Palpations: Abdomen is soft  Tenderness: There is no abdominal tenderness  Musculoskeletal: Normal range of motion  Right lower leg: Edema present  Left lower leg: Edema present  Comments: Generalized weakness   Skin:     General: Skin is warm and dry  Neurological:      Mental Status: He is alert     Psychiatric:         Attention and Perception: Attention normal          Mood and Affect: Mood normal          Speech: Speech normal          Behavior: Behavior normal          Thought Content: Thought content normal          Cognition and Memory: Cognition normal      Pertinent Laboratory/Diagnostic Studies:  [facility] Labs Reviewed    Current Medications   Medication list reviewed and updated today  Please see paper chart for updated medication list      Lavenia Sicard  46:94 PM      Name: Hannah Lin  : 1921  MRN: [id number]  DOS: 3/8/2021    Diagnoses:   Diagnosis ICD-10-CM Associated Orders   1  Persistent dry cough  R05    2  Generalized weakness  R53 1    3   Dyspnea on exertion  R06 00

## 2021-03-09 NOTE — ASSESSMENT & PLAN NOTE
· Pt states robitussin not working well especially at night  · Dc robitussin, tesselon pearls ordered TID  · Monitor for any relief

## 2021-03-15 ENCOUNTER — NURSING HOME VISIT (OUTPATIENT)
Dept: GERIATRICS | Facility: OTHER | Age: 86
End: 2021-03-15
Payer: MEDICARE

## 2021-03-15 VITALS
DIASTOLIC BLOOD PRESSURE: 76 MMHG | SYSTOLIC BLOOD PRESSURE: 144 MMHG | TEMPERATURE: 97.6 F | RESPIRATION RATE: 18 BRPM | BODY MASS INDEX: 35.53 KG/M2 | HEART RATE: 86 BPM | WEIGHT: 207 LBS

## 2021-03-15 DIAGNOSIS — R05.3 PERSISTENT DRY COUGH: Primary | ICD-10-CM

## 2021-03-15 DIAGNOSIS — R06.00 DYSPNEA ON EXERTION: ICD-10-CM

## 2021-03-15 PROCEDURE — 99309 SBSQ NF CARE MODERATE MDM 30: CPT | Performed by: NURSE PRACTITIONER

## 2021-03-15 NOTE — PROGRESS NOTES
[facility]  [address]  [phone]  [facility]  STR Acute Note        NAME: Chucho Thompson  AGE: 80 y o  SEX: male    DATE OF ENCOUNTER: 3/15/2021    Assessment and Plan     1  Persistent dry cough  Assessment & Plan:  · Continues with dry cough worse at night time  · Change albuterol inhaler with spacer to QID scheduled for 7 days then back to PRN  · tessalon pearls      2  Dyspnea on exertion  Assessment & Plan:  · Improving  · Chest xray shows improvement in aeration  · Continue with albuterol        No orders of the defined types were placed in this encounter  History of Present Illness     Carlos Arias 80 y o  male seen for follow-up of care and treatment plan for ambulatory dysfunction doing well at STR, persistent dry cough improving with albuterol     The following portions of the patient's history were reviewed and updated as appropriate: allergies, current medications, past family history, past medical history, past social history, past surgical history and problem list     Review of Systems     Review of Systems   Constitutional: Negative for chills and fever  HENT: Negative for ear pain and sore throat  Eyes: Negative for pain and visual disturbance  Respiratory: Positive for cough (dry) and shortness of breath  Negative for wheezing  Cardiovascular: Negative for chest pain and palpitations  Gastrointestinal: Negative for abdominal pain and vomiting  Genitourinary: Negative for dysuria and hematuria  Musculoskeletal: Positive for gait problem  Negative for arthralgias and back pain  Skin: Negative for color change and rash  Neurological: Positive for weakness  Negative for seizures and syncope  All other systems reviewed and are negative        Active Problem List     Patient Active Problem List   Diagnosis    Ambulatory dysfunction    Multiple falls    Hypertension    Chronic midline thoracic back pain    Gastroesophageal reflux disease without esophagitis    History of bladder cancer    Acute kidney injury superimposed on chronic kidney disease (HCC)    Constipation    Persistent dry cough    Insomnia    Dyspnea on exertion    Generalized weakness    Goals of care, counseling/discussion       Objective     /76   Pulse 86   Temp 97 6 °F (36 4 °C)   Resp 18   Wt 93 9 kg (207 lb)   BMI 35 53 kg/m²     Physical Exam  Vitals signs reviewed  Constitutional:       Appearance: He is well-developed  HENT:      Head: Normocephalic and atraumatic  Eyes:      Conjunctiva/sclera: Conjunctivae normal    Neck:      Musculoskeletal: Normal range of motion  Cardiovascular:      Rate and Rhythm: Normal rate and regular rhythm  Heart sounds: Normal heart sounds, S1 normal and S2 normal  No murmur  Pulmonary:      Effort: Pulmonary effort is normal  No respiratory distress  Breath sounds: Normal breath sounds  No decreased breath sounds or wheezing  Comments: Persistent dry cough  Abdominal:      General: Bowel sounds are normal  There is no distension  Palpations: Abdomen is soft  Tenderness: There is no abdominal tenderness  Musculoskeletal: Normal range of motion  Comments: Generalized weakness   Skin:     General: Skin is warm and dry  Neurological:      Mental Status: He is alert  Psychiatric:         Attention and Perception: Attention normal          Mood and Affect: Mood normal          Speech: Speech normal          Behavior: Behavior normal          Thought Content: Thought content normal          Cognition and Memory: Cognition normal      Pertinent Laboratory/Diagnostic Studies:  [facility] Labs Reviewed    Current Medications   Medication list reviewed and updated today   Please see paper chart for updated medication list      Leopold Chinchilla  3/15/18129:23 PM      Name: Erlinda Spencer  : 1921  MRN: [id number]  DOS: 3/15/2021    Diagnoses:   Diagnosis ICD-10-CM Associated Orders 1  Persistent dry cough  R05    2   Dyspnea on exertion  R06 00

## 2021-03-15 NOTE — ASSESSMENT & PLAN NOTE
· Continues with dry cough worse at night time  · Change albuterol inhaler with spacer to QID scheduled for 7 days then back to PRN  · tessalon pearls

## 2021-03-18 ENCOUNTER — NURSING HOME VISIT (OUTPATIENT)
Dept: GERIATRICS | Facility: OTHER | Age: 86
End: 2021-03-18
Payer: MEDICARE

## 2021-03-18 VITALS
DIASTOLIC BLOOD PRESSURE: 74 MMHG | RESPIRATION RATE: 18 BRPM | BODY MASS INDEX: 35.53 KG/M2 | SYSTOLIC BLOOD PRESSURE: 145 MMHG | WEIGHT: 207 LBS | TEMPERATURE: 97.9 F | HEART RATE: 86 BPM

## 2021-03-18 DIAGNOSIS — G89.29 CHRONIC MIDLINE THORACIC BACK PAIN: Primary | ICD-10-CM

## 2021-03-18 DIAGNOSIS — M54.6 CHRONIC MIDLINE THORACIC BACK PAIN: Primary | ICD-10-CM

## 2021-03-18 DIAGNOSIS — R06.00 DYSPNEA ON EXERTION: ICD-10-CM

## 2021-03-18 DIAGNOSIS — I10 HYPERTENSION, UNSPECIFIED TYPE: ICD-10-CM

## 2021-03-18 DIAGNOSIS — R05.3 PERSISTENT DRY COUGH: ICD-10-CM

## 2021-03-18 DIAGNOSIS — R53.1 GENERALIZED WEAKNESS: ICD-10-CM

## 2021-03-18 PROCEDURE — 99316 NF DSCHRG MGMT 30 MIN+: CPT | Performed by: NURSE PRACTITIONER

## 2021-03-18 NOTE — PROGRESS NOTES
[address]  [address]   [phone]  [address] [place] [address] St: [facility]: [facility]  POS: 31: SNF/Short Term Rehab    NAME: Eric Hendricks  AGE: 80 y o  :1921 SEX: male [id number]  DATE OF ADMISSION: 21 DATE OF DISCHARGE: 3/18/21 DISCHARGE DISPOSITION: Stable    Reason for admission: Patient was admitted for rehabilitation after hospitalization for Pna, ambulatory dysfunction  Course of stay: Patient received skilled nursing care, PT/OT/ST, dietary and  during their stay at [facility] with an overall improvement in functional status  PT/OT Report: bed mobility min assist, sit to transfer supervision, sit to stand min assist, ambulates [de-identified]' with rolling walker with supervision, upper body dressing mod assist, upper body bating supervision, lower body dressing super to min assist, lower body bathing supervision, toileting contact guard assist, hygiene min assist clothing management contact guard assist    Discharge Medications: See discharge medication list which was reviewed and signed  Status at time of discharge: Stable    Today's Visit: 3/18/91509:04 PM    Subjective: 8 y o  year old male seen and examined today for discharge planning  Patient is scheduled to be discharged on 3/18/21 to home with the following services: VNA, PT and OT  Patient states that they are feeling well and appear comfortable in the room  They deny headache, dizziness, blurred vision, dyspnea, abdominal pain, nausea, vomiting and diarrhea  Vitals:   Vitals:    21 1943   BP: 145/74   Pulse: 86   Resp: 18   Temp: 97 9 °F (36 6 °C)       Exam: Physical Exam  Vitals signs reviewed  Constitutional:       Appearance: He is well-developed  HENT:      Head: Normocephalic and atraumatic  Eyes:      Conjunctiva/sclera: Conjunctivae normal    Neck:      Musculoskeletal: Normal range of motion     Cardiovascular:      Rate and Rhythm: Normal rate and regular rhythm  Heart sounds: Normal heart sounds, S1 normal and S2 normal  No murmur  Pulmonary:      Effort: Pulmonary effort is normal  No respiratory distress  Breath sounds: Normal breath sounds  No wheezing  Abdominal:      General: Bowel sounds are normal  There is no distension  Palpations: Abdomen is soft  Tenderness: There is no abdominal tenderness  Musculoskeletal: Normal range of motion  Skin:     General: Skin is warm and dry  Neurological:      Mental Status: He is alert  Psychiatric:         Speech: Speech normal          Behavior: Behavior normal          Thought Content: Thought content normal      Discussion with patient/family and further instructions:  -Fall precautions  -Aspiration precautions  -Bleeding precautions  -Monitor for signs/symptoms of infection  -Medication list was reviewed and signed  -DME form was completed    Follow-up Recommendations: Please follow-up with your primary care physician within 7-10 days of discharge to review medication changes and current status       Problem List Follow-up Recommendations:  Problem List Items Addressed This Visit        Cardiovascular and Mediastinum    Hypertension     · Controlled  · Continue with losartan  · Monitor BP  · F/u with PCP            Other    Chronic midline thoracic back pain - Primary     · Pain well controlled  · Tylenol PRN  · Continue to monitor pain levels         Persistent dry cough     · Improving  · Continue with tessalon pearls PRN  · Albuterol inhaler with spacer PRN  · F/u with PCP         Dyspnea on exertion     · Improving  · Continue with albuterol inhaler PRN  · Incentive spirometer  · F/u with PCP         Generalized weakness     · Comorbidity  · Some age related  · Pt overweight  · Continue with restorative care  · PT/OT in home  · F/u with PCP               Noemi FridayASAD  3/55/85504:68 PM    Discharge  Statement   I spent 45 minutes minutes discharging the patient  This time was spent on the day of discharge  I had direct contact with the patient on the day of discharge

## 2021-03-18 NOTE — ASSESSMENT & PLAN NOTE
· Improving  · Continue with tessalon pearls PRN  · Albuterol inhaler with spacer PRN  · F/u with PCP

## 2021-03-18 NOTE — ASSESSMENT & PLAN NOTE
· Comorbidity  · Some age related  · Pt overweight  · Continue with restorative care  · PT/OT in home  · F/u with PCP

## 2022-01-16 ENCOUNTER — APPOINTMENT (EMERGENCY)
Dept: RADIOLOGY | Facility: HOSPITAL | Age: 87
DRG: 392 | End: 2022-01-16
Payer: MEDICARE

## 2022-01-16 ENCOUNTER — HOSPITAL ENCOUNTER (INPATIENT)
Facility: HOSPITAL | Age: 87
LOS: 3 days | Discharge: NURSING, REHAB OR LONG-TERM CARE | DRG: 392 | End: 2022-01-19
Attending: EMERGENCY MEDICINE | Admitting: INTERNAL MEDICINE
Payer: MEDICARE

## 2022-01-16 DIAGNOSIS — I21.4 NSTEMI (NON-ST ELEVATED MYOCARDIAL INFARCTION) (HCC): Primary | ICD-10-CM

## 2022-01-16 DIAGNOSIS — R53.1 WEAKNESS: ICD-10-CM

## 2022-01-16 DIAGNOSIS — R77.8 ELEVATED TROPONIN: ICD-10-CM

## 2022-01-16 DIAGNOSIS — G89.29 CHRONIC MIDLINE THORACIC BACK PAIN: ICD-10-CM

## 2022-01-16 DIAGNOSIS — H02.105 ECTROPION OF LEFT LOWER EYELID, UNSPECIFIED ECTROPION TYPE: ICD-10-CM

## 2022-01-16 DIAGNOSIS — R26.2 AMBULATORY DYSFUNCTION: ICD-10-CM

## 2022-01-16 DIAGNOSIS — N18.9 CKD (CHRONIC KIDNEY DISEASE): ICD-10-CM

## 2022-01-16 DIAGNOSIS — M54.6 CHRONIC MIDLINE THORACIC BACK PAIN: ICD-10-CM

## 2022-01-16 PROBLEM — N18.30 STAGE 3 CHRONIC KIDNEY DISEASE (HCC): Status: ACTIVE | Noted: 2022-01-16

## 2022-01-16 PROBLEM — R10.84 GENERALIZED ABDOMINAL PAIN: Status: ACTIVE | Noted: 2022-01-16

## 2022-01-16 PROBLEM — I16.1 HYPERTENSIVE EMERGENCY: Status: ACTIVE | Noted: 2021-02-23

## 2022-01-16 LAB
2HR DELTA HS TROPONIN: -1 NG/L
4HR DELTA HS TROPONIN: 2 NG/L
ALBUMIN SERPL BCP-MCNC: 3.2 G/DL (ref 3.5–5)
ALP SERPL-CCNC: 156 U/L (ref 46–116)
ALT SERPL W P-5'-P-CCNC: 25 U/L (ref 12–78)
ANION GAP SERPL CALCULATED.3IONS-SCNC: 8 MMOL/L (ref 4–13)
APTT PPP: 30 SECONDS (ref 23–37)
APTT PPP: 31 SECONDS (ref 23–37)
AST SERPL W P-5'-P-CCNC: 54 U/L (ref 5–45)
BACTERIA UR QL AUTO: NORMAL /HPF
BASOPHILS # BLD AUTO: 0.07 THOUSANDS/ΜL (ref 0–0.1)
BASOPHILS NFR BLD AUTO: 1 % (ref 0–1)
BILIRUB SERPL-MCNC: 1.79 MG/DL (ref 0.2–1)
BILIRUB UR QL STRIP: NEGATIVE
BUN SERPL-MCNC: 15 MG/DL (ref 5–25)
CALCIUM ALBUM COR SERPL-MCNC: 9.8 MG/DL (ref 8.3–10.1)
CALCIUM SERPL-MCNC: 9.2 MG/DL (ref 8.3–10.1)
CARDIAC TROPONIN I PNL SERPL HS: 53 NG/L
CARDIAC TROPONIN I PNL SERPL HS: 54 NG/L
CARDIAC TROPONIN I PNL SERPL HS: 56 NG/L
CHLORIDE SERPL-SCNC: 102 MMOL/L (ref 100–108)
CLARITY UR: CLEAR
CO2 SERPL-SCNC: 24 MMOL/L (ref 21–32)
COLOR UR: YELLOW
CREAT SERPL-MCNC: 1.67 MG/DL (ref 0.6–1.3)
EOSINOPHIL # BLD AUTO: 0.01 THOUSAND/ΜL (ref 0–0.61)
EOSINOPHIL NFR BLD AUTO: 0 % (ref 0–6)
ERYTHROCYTE [DISTWIDTH] IN BLOOD BY AUTOMATED COUNT: 13.3 % (ref 11.6–15.1)
FLUAV RNA RESP QL NAA+PROBE: NEGATIVE
FLUBV RNA RESP QL NAA+PROBE: NEGATIVE
GFR SERPL CREATININE-BSD FRML MDRD: 33 ML/MIN/1.73SQ M
GLUCOSE SERPL-MCNC: 113 MG/DL (ref 65–140)
GLUCOSE UR STRIP-MCNC: NEGATIVE MG/DL
HCT VFR BLD AUTO: 46.5 % (ref 36.5–49.3)
HGB BLD-MCNC: 15.2 G/DL (ref 12–17)
HGB UR QL STRIP.AUTO: ABNORMAL
HYALINE CASTS #/AREA URNS LPF: NORMAL /LPF
IMM GRANULOCYTES # BLD AUTO: 0.05 THOUSAND/UL (ref 0–0.2)
IMM GRANULOCYTES NFR BLD AUTO: 0 % (ref 0–2)
INR PPP: 1.11 (ref 0.84–1.19)
KETONES UR STRIP-MCNC: ABNORMAL MG/DL
LACTATE SERPL-SCNC: 1.7 MMOL/L (ref 0.5–2)
LACTATE SERPL-SCNC: 2.1 MMOL/L (ref 0.5–2)
LEUKOCYTE ESTERASE UR QL STRIP: NEGATIVE
LIPASE SERPL-CCNC: 19 U/L (ref 73–393)
LYMPHOCYTES # BLD AUTO: 2.09 THOUSANDS/ΜL (ref 0.6–4.47)
LYMPHOCYTES NFR BLD AUTO: 14 % (ref 14–44)
MCH RBC QN AUTO: 30.4 PG (ref 26.8–34.3)
MCHC RBC AUTO-ENTMCNC: 32.7 G/DL (ref 31.4–37.4)
MCV RBC AUTO: 93 FL (ref 82–98)
MONOCYTES # BLD AUTO: 1.53 THOUSAND/ΜL (ref 0.17–1.22)
MONOCYTES NFR BLD AUTO: 11 % (ref 4–12)
NEUTROPHILS # BLD AUTO: 10.75 THOUSANDS/ΜL (ref 1.85–7.62)
NEUTS SEG NFR BLD AUTO: 74 % (ref 43–75)
NITRITE UR QL STRIP: NEGATIVE
NON-SQ EPI CELLS URNS QL MICRO: NORMAL /HPF
NRBC BLD AUTO-RTO: 0 /100 WBCS
PH UR STRIP.AUTO: 6 [PH]
PLATELET # BLD AUTO: 213 THOUSANDS/UL (ref 149–390)
PMV BLD AUTO: 10.3 FL (ref 8.9–12.7)
POTASSIUM SERPL-SCNC: 4.1 MMOL/L (ref 3.5–5.3)
PROCALCITONIN SERPL-MCNC: 0.56 NG/ML
PROT SERPL-MCNC: 7.8 G/DL (ref 6.4–8.2)
PROT UR STRIP-MCNC: ABNORMAL MG/DL
PROTHROMBIN TIME: 13.9 SECONDS (ref 11.6–14.5)
RBC # BLD AUTO: 5 MILLION/UL (ref 3.88–5.62)
RBC #/AREA URNS AUTO: NORMAL /HPF
RSV RNA RESP QL NAA+PROBE: NEGATIVE
SARS-COV-2 RNA RESP QL NAA+PROBE: NEGATIVE
SODIUM SERPL-SCNC: 134 MMOL/L (ref 136–145)
SP GR UR STRIP.AUTO: 1.02 (ref 1–1.03)
UROBILINOGEN UR QL STRIP.AUTO: 0.2 E.U./DL
WBC # BLD AUTO: 14.5 THOUSAND/UL (ref 4.31–10.16)
WBC #/AREA URNS AUTO: NORMAL /HPF

## 2022-01-16 PROCEDURE — 85730 THROMBOPLASTIN TIME PARTIAL: CPT | Performed by: EMERGENCY MEDICINE

## 2022-01-16 PROCEDURE — 0241U HB NFCT DS VIR RESP RNA 4 TRGT: CPT | Performed by: EMERGENCY MEDICINE

## 2022-01-16 PROCEDURE — 70450 CT HEAD/BRAIN W/O DYE: CPT

## 2022-01-16 PROCEDURE — 99285 EMERGENCY DEPT VISIT HI MDM: CPT

## 2022-01-16 PROCEDURE — 36415 COLL VENOUS BLD VENIPUNCTURE: CPT | Performed by: EMERGENCY MEDICINE

## 2022-01-16 PROCEDURE — 85025 COMPLETE CBC W/AUTO DIFF WBC: CPT | Performed by: EMERGENCY MEDICINE

## 2022-01-16 PROCEDURE — 84145 PROCALCITONIN (PCT): CPT | Performed by: EMERGENCY MEDICINE

## 2022-01-16 PROCEDURE — 83605 ASSAY OF LACTIC ACID: CPT | Performed by: EMERGENCY MEDICINE

## 2022-01-16 PROCEDURE — 81001 URINALYSIS AUTO W/SCOPE: CPT | Performed by: EMERGENCY MEDICINE

## 2022-01-16 PROCEDURE — 99223 1ST HOSP IP/OBS HIGH 75: CPT | Performed by: INTERNAL MEDICINE

## 2022-01-16 PROCEDURE — 96365 THER/PROPH/DIAG IV INF INIT: CPT

## 2022-01-16 PROCEDURE — 93005 ELECTROCARDIOGRAM TRACING: CPT

## 2022-01-16 PROCEDURE — 74176 CT ABD & PELVIS W/O CONTRAST: CPT

## 2022-01-16 PROCEDURE — 71250 CT THORAX DX C-: CPT

## 2022-01-16 PROCEDURE — 99285 EMERGENCY DEPT VISIT HI MDM: CPT | Performed by: EMERGENCY MEDICINE

## 2022-01-16 PROCEDURE — 84484 ASSAY OF TROPONIN QUANT: CPT | Performed by: EMERGENCY MEDICINE

## 2022-01-16 PROCEDURE — 1123F ACP DISCUSS/DSCN MKR DOCD: CPT | Performed by: EMERGENCY MEDICINE

## 2022-01-16 PROCEDURE — G1004 CDSM NDSC: HCPCS

## 2022-01-16 PROCEDURE — 83690 ASSAY OF LIPASE: CPT | Performed by: EMERGENCY MEDICINE

## 2022-01-16 PROCEDURE — 84484 ASSAY OF TROPONIN QUANT: CPT | Performed by: INTERNAL MEDICINE

## 2022-01-16 PROCEDURE — 87040 BLOOD CULTURE FOR BACTERIA: CPT | Performed by: EMERGENCY MEDICINE

## 2022-01-16 PROCEDURE — 85610 PROTHROMBIN TIME: CPT | Performed by: EMERGENCY MEDICINE

## 2022-01-16 PROCEDURE — 80053 COMPREHEN METABOLIC PANEL: CPT | Performed by: EMERGENCY MEDICINE

## 2022-01-16 PROCEDURE — 71045 X-RAY EXAM CHEST 1 VIEW: CPT

## 2022-01-16 RX ORDER — POLYETHYLENE GLYCOL 3350 17 G/17G
17 POWDER, FOR SOLUTION ORAL DAILY PRN
Status: DISCONTINUED | OUTPATIENT
Start: 2022-01-16 | End: 2022-01-19 | Stop reason: HOSPADM

## 2022-01-16 RX ORDER — AMOXICILLIN 250 MG
2 CAPSULE ORAL
Status: DISCONTINUED | OUTPATIENT
Start: 2022-01-16 | End: 2022-01-19 | Stop reason: HOSPADM

## 2022-01-16 RX ORDER — PANTOPRAZOLE SODIUM 40 MG/1
40 TABLET, DELAYED RELEASE ORAL
Status: DISCONTINUED | OUTPATIENT
Start: 2022-01-17 | End: 2022-01-19 | Stop reason: HOSPADM

## 2022-01-16 RX ORDER — SODIUM CHLORIDE, SODIUM GLUCONATE, SODIUM ACETATE, POTASSIUM CHLORIDE, MAGNESIUM CHLORIDE, SODIUM PHOSPHATE, DIBASIC, AND POTASSIUM PHOSPHATE .53; .5; .37; .037; .03; .012; .00082 G/100ML; G/100ML; G/100ML; G/100ML; G/100ML; G/100ML; G/100ML
500 INJECTION, SOLUTION INTRAVENOUS ONCE
Status: COMPLETED | OUTPATIENT
Start: 2022-01-16 | End: 2022-01-16

## 2022-01-16 RX ORDER — HYDRALAZINE HYDROCHLORIDE 20 MG/ML
5 INJECTION INTRAMUSCULAR; INTRAVENOUS EVERY 6 HOURS PRN
Status: DISCONTINUED | OUTPATIENT
Start: 2022-01-16 | End: 2022-01-19 | Stop reason: HOSPADM

## 2022-01-16 RX ORDER — HEPARIN SODIUM 1000 [USP'U]/ML
4000 INJECTION, SOLUTION INTRAVENOUS; SUBCUTANEOUS
Status: DISCONTINUED | OUTPATIENT
Start: 2022-01-16 | End: 2022-01-16

## 2022-01-16 RX ORDER — HEPARIN SODIUM 1000 [USP'U]/ML
4000 INJECTION, SOLUTION INTRAVENOUS; SUBCUTANEOUS ONCE
Status: COMPLETED | OUTPATIENT
Start: 2022-01-16 | End: 2022-01-16

## 2022-01-16 RX ORDER — LANOLIN ALCOHOL/MO/W.PET/CERES
6 CREAM (GRAM) TOPICAL
Status: DISCONTINUED | OUTPATIENT
Start: 2022-01-16 | End: 2022-01-19 | Stop reason: HOSPADM

## 2022-01-16 RX ORDER — HEPARIN SODIUM 1000 [USP'U]/ML
2000 INJECTION, SOLUTION INTRAVENOUS; SUBCUTANEOUS
Status: DISCONTINUED | OUTPATIENT
Start: 2022-01-16 | End: 2022-01-16

## 2022-01-16 RX ORDER — HEPARIN SODIUM 10000 [USP'U]/100ML
3-20 INJECTION, SOLUTION INTRAVENOUS
Status: DISCONTINUED | OUTPATIENT
Start: 2022-01-16 | End: 2022-01-16

## 2022-01-16 RX ORDER — HEPARIN SODIUM 5000 [USP'U]/ML
5000 INJECTION, SOLUTION INTRAVENOUS; SUBCUTANEOUS EVERY 8 HOURS SCHEDULED
Status: DISCONTINUED | OUTPATIENT
Start: 2022-01-17 | End: 2022-01-19 | Stop reason: HOSPADM

## 2022-01-16 RX ORDER — LOSARTAN POTASSIUM 25 MG/1
25 TABLET ORAL DAILY
Status: DISCONTINUED | OUTPATIENT
Start: 2022-01-16 | End: 2022-01-19 | Stop reason: HOSPADM

## 2022-01-16 RX ADMIN — HEPARIN SODIUM 11.1 UNITS/KG/HR: 10000 INJECTION, SOLUTION INTRAVENOUS at 15:33

## 2022-01-16 RX ADMIN — HEPARIN SODIUM 4000 UNITS: 1000 INJECTION INTRAVENOUS; SUBCUTANEOUS at 15:32

## 2022-01-16 RX ADMIN — Medication 6 MG: at 22:37

## 2022-01-16 RX ADMIN — LOSARTAN POTASSIUM 25 MG: 25 TABLET, FILM COATED ORAL at 18:09

## 2022-01-16 RX ADMIN — SODIUM CHLORIDE, SODIUM GLUCONATE, SODIUM ACETATE, POTASSIUM CHLORIDE, MAGNESIUM CHLORIDE, SODIUM PHOSPHATE, DIBASIC, AND POTASSIUM PHOSPHATE 500 ML: .53; .5; .37; .037; .03; .012; .00082 INJECTION, SOLUTION INTRAVENOUS at 14:05

## 2022-01-16 NOTE — ED NOTES
Patient transported to [facility] [address], RN  01/16/22 9445

## 2022-01-16 NOTE — ASSESSMENT & PLAN NOTE
Resolved  Present prior to admission  CT C/A/P unable to identified probable cause for abdominal pain  Pain possibly secondary to constipation  Started scheduled Senokot and PRN Miralax

## 2022-01-16 NOTE — SEPSIS NOTE
Sepsis Note   Taryn Foster 80 y o  male MRN: [id number]  Unit/Bed#: ED 14 Encounter: [id number]     tachypnea and tachycardia possibly due to dehydration versus metabolic disorder  Do not suspect infection at this time however sepsis labs are ordered  Will continue to monitor and re-evaluate  qSOFA     Row Name 01/16/22 1209 01/16/22 1207             Altered mental status GCS < 15 -- --       Respiratory Rate > / =22 -- 0       Systolic BP < / =410 0 --       Q Sofa Score 0 --                  Initial Sepsis Screening     Row Name 01/16/22 1221                Is the patient's history suggestive of a new or worsening infection? No  -KM        Suspected source of infection --        Are two or more of the following signs & symptoms of infection both present and new to the patient? --        Indicate SIRS criteria Tachycardia > 90 bpm;Tachypnea > 20 resp per min  -KM        If the answer is yes to both questions, suspicion of sepsis is present --        If severe sepsis is present AND tissue hypoperfusion perists in the hour after fluid resuscitation or lactate > 4, the patient meets criteria for SEPTIC SHOCK --        Are any of the following organ dysfunction criteria present within 6 hours of suspected infection and SIRS criteria that are NOT considered to be chronic conditions?  --        Organ dysfunction --        Date of presentation of severe sepsis --        Time of presentation of severe sepsis --        Tissue hypoperfusion persists in the hour after crystalloid fluid administration, evidenced, by either: --        Was hypotension present within one hour of the conclusion of crystalloid fluid administration? --        Date of presentation of septic shock --        Time of presentation of septic shock --              User Key  (r) = Recorded By, (t) = Taken By, (c) = Cosigned By    [address] Name Provider Type    [facility], DO Physician

## 2022-01-16 NOTE — ASSESSMENT & PLAN NOTE
Not well controlled  SBP ranging 185-217 mm Hg on initial presentation  Likely etiology for elevated troponin  Continue home Cozaar 25 mg  Ordered PRN IV Hydralazine SBP >160 mm Hg

## 2022-01-16 NOTE — ED ATTENDING ATTESTATION
Final Diagnosis:  1  NSTEMI (non-ST elevated myocardial infarction) ([facility] [address] )    2  Weakness    3  Ambulatory dysfunction    4  CKD (chronic kidney disease)    5  Elevated troponin      ED Course as of 01/17/22 1557   Sun Jan 16, 2022   1354 LACTIC ACID(!!): 2 1   1354 hs TnI 0hr(!!): 54       I, Ricky Alexis MD, saw and evaluated the patient  All available labs and X-rays were ordered by me or the resident and have been reviewed by myself  I discussed the patient with the resident / non-physician and agree with the resident's / non-physician practitioner's findings and plan as documented in the resident's / non-physician practicitioner's note, except where noted  At this point, I agree with the current assessment done in the ED  I was present during key portions of all procedures performed unless otherwise stated  Chief Complaint   Patient presents with    Weakness - Generalized     Patient reported to have had weakness worse in the legs for last 2 days  EMS had assisted patient when he slide out of bed yesterday but declined to come to hospital  Patient also reports left lower abdominal and back pain  This is a 80 y o  male presenting for evaluation of weakness  He has had a couple falls in the last few days when going to the bathroom  Poor historian  He called EMS for one of them but refused transport here  Today was worsening so son called EMS to bring him in  Can't do ADLs anymore and that's new  Denies CP  +SOB but maybe it's chronic? PMH:   has a past medical history of BPH with obstruction/lower urinary tract symptoms, Chronic kidney disease, GERD (gastroesophageal reflux disease), History of bladder cancer, Hypertension, and Spinal stenosis  PSH:   has a past surgical history that includes Gallbladder surgery; Knee arthroscopy; Ankle arthroscopy; and Blepharoplasty      Social:  Social History     Substance and Sexual Activity   Alcohol Use Never     Social History     Tobacco Use Smoking Status Former Smoker   Smokeless Tobacco Never Used     Social History     Substance and Sexual Activity   Drug Use Never     PE:  Vitals:    01/17/22 0901 01/17/22 0901 01/17/22 1415 01/17/22 1541   BP:  136/86 144/70 (!) 184/101   BP Location:  Right arm     Pulse:  82 75 105   Resp:  18     Temp:  98 1 °F (36 7 °C)     TempSrc:  Oral     SpO2:  95%  95%   Weight: 94 kg (207 lb 3 7 oz)  93 9 kg (207 lb)    Height:   5' 4" (1 626 m)    General: VSS, NAD, awake, alert  Well-nourished, well-developed  Appears stated age  Head: Normocephalic, atraumatic, nontender  Eyes: PERRL, EOM-I  No diplopia  No hyphema  No subconjunctival hemorrhages  Symmetrical lids  ENTAtraumatic external nose and ears  MMM  No stridor  Normal phonation  No drooling  Base of mouth is soft  No mastoid tenderness  Neck: Symmetric, trachea midline  No JVD  CV: Peripheral pulses +2 throughout  No chest wall tenderness  Lungs:   Unlabored   No retractions  No crepitus  No tachypnea  No paradoxical motion  Abd: +BS, soft, NT/ND    MSK:   FROM   No lower extremity edema  Back:   Diffuse back tenderness  No CVAT  Skin: Dry, intact  Neuro: AAOx3, GCS 15, CN II-XII grossly intact  Motor grossly intact  Psychiatric/Behavioral: Appropriate mood and affect   Exam: deferred  A:  - Weakness  P:  - CTH and back b/c of fall with back pain  - admission    - 13 point ROS was performed and all are normal unless stated in the history above  - Nursing note reviewed  Vitals reviewed  - Orders placed by myself and/or advanced practitioner / resident     - Previous chart was reviewed  - No language barrier    - History obtained from patient  - There are no limitations to the history obtained  - Critical care time: Not applicable for this patient       Code Status: Level 3 - DNAR and DNI  Advance Directive and Living Will:      Power of :    POLST:      Medications   senna-docusate sodium (SENOKOT S) 8 6-50 mg per tablet 2 tablet (2 tablets Oral Not Given 1/17/22 0158)   melatonin tablet 6 mg (6 mg Oral Given 1/16/22 2237)   hydrALAZINE (APRESOLINE) injection 5 mg (has no administration in time range)   losartan (COZAAR) tablet 25 mg (25 mg Oral Given 1/17/22 0915)   pantoprazole (PROTONIX) EC tablet 40 mg (40 mg Oral Given 1/17/22 0713)   polyethylene glycol (MIRALAX) packet 17 g (has no administration in time range)   heparin (porcine) subcutaneous injection 5,000 Units (5,000 Units Subcutaneous Given 1/17/22 0714)   multi-electrolyte (ISOLYTE-S PH 7 4) bolus 500 mL (0 mL Intravenous Stopped 1/16/22 1533)   heparin (porcine) injection 4,000 Units (4,000 Units Intravenous Given 1/16/22 1532)     CT head without contrast   Final Result      No acute intracranial abnormality  Microangiopathic changes  Workstation performed: [id number]         CT chest abdomen pelvis wo contrast   Final Result         1  No acute posttraumatic abnormality identified in the chest, abdomen, or pelvis  2   Nonemergent features is noted  Workstation performed: [id number]         XR chest portable   Final Result      Minimal bibasilar atelectasis                    Workstation performed: [id number]           Orders Placed This Encounter   Procedures    Blood culture #1    Blood culture #2    COVID19, Influenza A/B, RSV PCR, [facility] - 2 hour STAT    XR chest portable    CT head without contrast    CT chest abdomen pelvis wo contrast    CBC and differential    Comprehensive metabolic panel    Lactic acid    Procalcitonin with AM Reflex    Protime-INR    APTT    UA w Reflex to Microscopic w Reflex to Culture    Lipase    HS Troponin 0hr (reflex protocol)    Procalcitonin Reflex    HS Troponin I 2hr    HS Troponin I 4hr    Lactic acid 2 Hours    APTT six (6) hours after Heparin bolus/drip initiation or dosing change    Urine Microscopic    CBC and differential    Basic metabolic panel    Magnesium Phosphorus    Diet Regular; Regular House; Dysphagia 3-Dental Soft; Thin Liquid    Heparin: ACS (low) or Straight Infusion Protocol Administration Instructions    Heparin: ACS (Low) or Straight  Infusion Protocol Notify Physician If:    Heparin Infusion and Platelet Monitoring Per Protocol    Nursing communication Continue IV as ordered      Notify admitting physician    Notify admitting physician on arrival    Vital Signs per unit routine    Up with assistance    Apply SCD or Foot pumps    Level 3 - DNAR (Do Not Attempt Resuscitation) and DNI (Do Not Intubate)    Inpatient Consult to Case Management    Inpatient consult to Cardiology    OT eval and treat    PT eval and treat    SPEECH bedside swallowing evaluation and treatment    ECG 12 lead    Echo complete w/ contrast if indicated    Inpatient Admission    Fall precautions     Labs Reviewed   CBC AND DIFFERENTIAL - Abnormal       Result Value Ref Range Status    WBC 14 50 (*) 4 31 - 10 16 Thousand/uL Final    RBC 5 00  3 88 - 5 62 Million/uL Final    Hemoglobin 15 2  12 0 - 17 0 g/dL Final    Hematocrit 46 5  36 5 - 49 3 % Final    MCV 93  82 - 98 fL Final    MCH 30 4  26 8 - 34 3 pg Final    MCHC 32 7  31 4 - 37 4 g/dL Final    RDW 13 3  11 6 - 15 1 % Final    MPV 10 3  8 9 - 12 7 fL Final    Platelets 597  884 - 390 Thousands/uL Final    nRBC 0  /100 WBCs Final    Neutrophils Relative 74  43 - 75 % Final    Immat GRANS % 0  0 - 2 % Final    Lymphocytes Relative 14  14 - 44 % Final    Monocytes Relative 11  4 - 12 % Final    Eosinophils Relative 0  0 - 6 % Final    Basophils Relative 1  0 - 1 % Final    Neutrophils Absolute 10 75 (*) 1 85 - 7 62 Thousands/µL Final    Immature Grans Absolute 0 05  0 00 - 0 20 Thousand/uL Final    Lymphocytes Absolute 2 09  0 60 - 4 47 Thousands/µL Final    Monocytes Absolute 1 53 (*) 0 17 - 1 22 Thousand/µL Final    Eosinophils Absolute 0 01  0 00 - 0 61 Thousand/µL Final    Basophils Absolute 0 07  0 00 - 0 10 Thousands/µL Final COMPREHENSIVE METABOLIC PANEL - Abnormal    Sodium 134 (*) 136 - 145 mmol/L Final    Potassium 4 1  3 5 - 5 3 mmol/L Final    Chloride 102  100 - 108 mmol/L Final    CO2 24  21 - 32 mmol/L Final    ANION GAP 8  4 - 13 mmol/L Final    BUN 15  5 - 25 mg/dL Final    Creatinine 1 67 (*) 0 60 - 1 30 mg/dL Final    Comment: Standardized to IDMS reference method    Glucose 113  65 - 140 mg/dL Final    Comment: If the patient is fasting, the ADA then defines impaired fasting glucose as > 100 mg/dL and diabetes as > or equal to 123 mg/dL  Specimen collection should occur prior to Sulfasalazine administration due to the potential for falsely depressed results  Specimen collection should occur prior to Sulfapyridine administration due to the potential for falsely elevated results  Calcium 9 2  8 3 - 10 1 mg/dL Final    Corrected Calcium 9 8  8 3 - 10 1 mg/dL Final    AST 54 (*) 5 - 45 U/L Final    Comment: Specimen collection should occur prior to Sulfasalazine administration due to the potential for falsely depressed results  ALT 25  12 - 78 U/L Final    Comment: Specimen collection should occur prior to Sulfasalazine and/or Sulfapyridine administration due to the potential for falsely depressed results  Alkaline Phosphatase 156 (*) 46 - 116 U/L Final    Total Protein 7 8  6 4 - 8 2 g/dL Final    Albumin 3 2 (*) 3 5 - 5 0 g/dL Final    Total Bilirubin 1 79 (*) 0 20 - 1 00 mg/dL Final    Comment: Use of this assay is not recommended for patients undergoing treatment with eltrombopag due to the potential for falsely elevated results      eGFR 33  ml/min/1 73sq m Final    Narrative:     [place] guidelines for Chronic Kidney Disease (CKD):     Stage 1 with normal or high GFR (GFR > 90 mL/min/1 73 square meters)    Stage 2 Mild CKD (GFR = 60-89 mL/min/1 73 square meters)    Stage 3A Moderate CKD (GFR = 45-59 mL/min/1 73 square meters)    Stage 3B Moderate CKD (GFR = 30-44 mL/min/1 73 square meters)    Stage 4 Severe CKD (GFR = 15-29 mL/min/1 73 square meters)    Stage 5 End Stage CKD (GFR <15 mL/min/1 73 square meters)  Note: GFR calculation is accurate only with a steady state creatinine   LACTIC ACID, PLASMA - Abnormal    LACTIC ACID 2 1 (*) 0 5 - 2 0 mmol/L Final    Narrative:     Result may be elevated if tourniquet was used during collection  PROCALCITONIN TEST - Abnormal    Procalcitonin 0 56 (*) <=0 25 ng/ml Final    Comment: Suspected Lower Respiratory Tract Infection (LRTI):  - LESS than or EQUAL to 0 25 ng/mL:   low likelihood for bacterial LRTI; antibiotics DISCOURAGED   - GREATER than 0 25 ng/mL:   increased likelihood for bacterial LRTI; antibiotics ENCOURAGED  Suspected Sepsis:  - Strongly consider initiating antibiotics in ALL UNSTABLE patients  - LESS than or EQUAL to 0 5 ng/mL:   low likelihood for bacterial sepsis; antibiotics DISCOURAGED   - GREATER than 0 5 ng/mL:   increased likelihood for bacterial sepsis; antibiotics ENCOURAGED   - GREATER than 2 ng/mL:   high risk for severe sepsis / septic shock; antibiotics strongly ENCOURAGED  Decisions on antibiotic use should not be based solely on Procalcitonin (PCT) levels  If PCT is low but uncertainty exists with stopping antibiotics, repeat PCT in 6-24 hours to confirm the low level  If antibiotics are administered (regardless if initial PCT was high or low), repeat PCT every 1-2 days to consider early antibiotic cessation (when GREATER than 80% decrease from the peak OR when PCT drops below designated cutoffs, whichever comes first), so long as the infection is NOT one that typically requires prolonged treatment durations (e g , bone/joint infections, endocarditis, Staph  aureus bacteremia)      Situations of FALSE-POSITIVE Procalcitonin values:  1) Newborns < 67 hours old  2) Massive stress from severe trauma / burns, major surgery, acute pancreatitis, cardiogenic / hemorrhagic shock, sickle cell crisis, or other organ perfusion abnormalities  3) Malaria and some Candidal infections  4) Treatment with agents that stimulate cytokines (e g , OKT3, anti-lymphocyte globulins, alemtuzumab, IL-2, granulocyte transfusion [NOT GCSFs])  5) Chronic renal disease causes elevated baseline levels (consider GREATER than 0 75 ng/mL as an abnormal cut-off); initiating HD/CRRT may cause transient decreases  6) Paraneoplastic syndromes from medullary thyroid or SCLC, some forms of vasculitis, and acute [id number] disease    Situations of FALSE-NEGATIVE Procalcitonin values:  1) Too early in clinical course for PCT to have reached its peak (may repeat in 6-24 hours to confirm low level)  2) Localized infection WITHOUT systemic (SIRS / sepsis) response (e g , an abscess, osteomyelitis, cystitis)  3) Mycobacteria (e g , Tuberculosis, MAC)  4) Cystic fibrosis exacerbations     UA W REFLEX TO MICROSCOPIC WITH REFLEX TO CULTURE - Abnormal    Color, UA Yellow   Final    Clarity, UA Clear   Final    Specific Gravity, UA 1 020  1 003 - 1 030 Final    pH, UA 6 0  4 5, 5 0, 5 5, 6 0, 6 5, 7 0, 7 5, 8 0 Final    Leukocytes, UA Negative  Negative Final    Nitrite, UA Negative  Negative Final    Protein, UA 30 (1+) (*) Negative mg/dl Final    Glucose, UA Negative  Negative mg/dl Final    Ketones, UA 15 (1+) (*) Negative mg/dl Final    Urobilinogen, UA 0 2  0 2, 1 0 E U /dl E U /dl Final    Bilirubin, UA Negative  Negative Final    Blood, UA Trace-Intact   Final   LIPASE - Abnormal    Lipase 19 (*) 73 - 393 u/L Final   HS TROPONIN I 0HR - Abnormal    hs TnI 0hr 54 (*) "Refer to ACS Flowchart"- see link ng/L Final    Comment:                                              Initial (time 0) result  If >=50 ng/L, Myocardial injury suggested ;  Type of myocardial injury and treatment strategy  to be determined  If 5-49 ng/L, a delta result at 2 hours and or 4 hours will be needed to further evaluate    If <4 ng/L, and chest pain has been >3 hours since onset, patient may qualify for discharge based on the HEART score in the ED  If <5 ng/L and <3hours since onset of chest pain, a delta result at 2 hours will be needed to further evaluate  Second Troponin (time 2 hours)  If calculated delta >= 20 ng/L,  Myocardial injury suggested ; Type of myocardial injury and treatment strategy to be determined  If 5-49 ng/L and the calculated delta is 5-19 ng/L, consult medical service for evaluation  Continue evaluation for ischemia on ecg and other possible etiology and repeat hs troponin at 4 hours  If delta is <5 ng/L at 2 hours, consider discharge based on risk stratification via the HEART score (if in ED), or DANIA risk score in IP/Observation  HS TROPONIN I 2HR - Abnormal    hs TnI 2hr 53 (*) "Refer to ACS Flowchart"- see link ng/L Final    Comment:                                              Initial (time 0) result  If >=50 ng/L, Myocardial injury suggested ;  Type of myocardial injury and treatment strategy  to be determined  If 5-49 ng/L, a delta result at 2 hours and or 4 hours will be needed to further evaluate  If <4 ng/L, and chest pain has been >3 hours since onset, patient may qualify for discharge based on the HEART score in the ED  If <5 ng/L and <3hours since onset of chest pain, a delta result at 2 hours will be needed to further evaluate  Second Troponin (time 2 hours)  If calculated delta >= 20 ng/L,  Myocardial injury suggested ; Type of myocardial injury and treatment strategy to be determined  If 5-49 ng/L and the calculated delta is 5-19 ng/L, consult medical service for evaluation  Continue evaluation for ischemia on ecg and other possible etiology and repeat hs troponin at 4 hours  If delta is <5 ng/L at 2 hours, consider discharge based on risk stratification via the HEART score (if in ED), or DANIA risk score in IP/Observation      Delta 2hr hsTnI -1  ng/L Final   HS TROPONIN I 4HR - Abnormal    hs TnI 4hr 56 (*) "Refer to ACS Flowchart"- see link ng/L Final    Comment:                                              Initial (time 0) result  If >=50 ng/L, Myocardial injury suggested ;  Type of myocardial injury and treatment strategy  to be determined  If 5-49 ng/L, a delta result at 2 hours and or 4 hours will be needed to further evaluate  If <4 ng/L, and chest pain has been >3 hours since onset, patient may qualify for discharge based on the HEART score in the ED  If <5 ng/L and <3hours since onset of chest pain, a delta result at 2 hours will be needed to further evaluate  Second Troponin (time 2 hours)  If calculated delta >= 20 ng/L,  Myocardial injury suggested ; Type of myocardial injury and treatment strategy to be determined  If 5-49 ng/L and the calculated delta is 5-19 ng/L, consult medical service for evaluation  Continue evaluation for ischemia on ecg and other possible etiology and repeat hs troponin at 4 hours  If delta is <5 ng/L at 2 hours, consider discharge based on risk stratification via the HEART score (if in ED), or DANIA risk score in IP/Observation      Delta 4hr hsTnI 2  ng/L Final   CBC AND DIFFERENTIAL - Abnormal    WBC 12 15 (*) 4 31 - 10 16 Thousand/uL Final    RBC 4 73  3 88 - 5 62 Million/uL Final    Hemoglobin 14 3  12 0 - 17 0 g/dL Final    Hematocrit 43 8  36 5 - 49 3 % Final    MCV 93  82 - 98 fL Final    MCH 30 2  26 8 - 34 3 pg Final    MCHC 32 6  31 4 - 37 4 g/dL Final    RDW 13 2  11 6 - 15 1 % Final    MPV 10 6  8 9 - 12 7 fL Final    Platelets 110  979 - 390 Thousands/uL Final    nRBC 0  /100 WBCs Final    Neutrophils Relative 76 (*) 43 - 75 % Final    Immat GRANS % 0  0 - 2 % Final    Lymphocytes Relative 12 (*) 14 - 44 % Final    Monocytes Relative 11  4 - 12 % Final    Eosinophils Relative 0  0 - 6 % Final    Basophils Relative 1  0 - 1 % Final    Neutrophils Absolute 9 15 (*) 1 85 - 7 62 Thousands/µL Final    Immature Grans Absolute 0 04  0 00 - 0 20 Thousand/uL Final    Lymphocytes Absolute 1 48  0 60 - 4 47 Thousands/µL Final    Monocytes Absolute 1 39 (*) 0 17 - 1 22 Thousand/µL Final    Eosinophils Absolute 0 02  0 00 - 0 61 Thousand/µL Final    Basophils Absolute 0 07  0 00 - 0 10 Thousands/µL Final   BASIC METABOLIC PANEL - Abnormal    Sodium 133 (*) 136 - 145 mmol/L Final    Potassium 4 2  3 5 - 5 3 mmol/L Final    Chloride 103  100 - 108 mmol/L Final    CO2 23  21 - 32 mmol/L Final    ANION GAP 7  4 - 13 mmol/L Final    BUN 18  5 - 25 mg/dL Final    Creatinine 1 54 (*) 0 60 - 1 30 mg/dL Final    Comment: Standardized to IDMS reference method    Glucose 105  65 - 140 mg/dL Final    Comment: If the patient is fasting, the ADA then defines impaired fasting glucose as > 100 mg/dL and diabetes as > or equal to 123 mg/dL  Specimen collection should occur prior to Sulfasalazine administration due to the potential for falsely depressed results  Specimen collection should occur prior to Sulfapyridine administration due to the potential for falsely elevated results      Calcium 8 6  8 3 - 10 1 mg/dL Final    eGFR 36  ml/min/1 73sq m Final    Narrative:     [place] guidelines for Chronic Kidney Disease (CKD):     Stage 1 with normal or high GFR (GFR > 90 mL/min/1 73 square meters)    Stage 2 Mild CKD (GFR = 60-89 mL/min/1 73 square meters)    Stage 3A Moderate CKD (GFR = 45-59 mL/min/1 73 square meters)    Stage 3B Moderate CKD (GFR = 30-44 mL/min/1 73 square meters)    Stage 4 Severe CKD (GFR = 15-29 mL/min/1 73 square meters)    Stage 5 End Stage CKD (GFR <15 mL/min/1 73 square meters)  Note: GFR calculation is accurate only with a steady state creatinine   COVID19, INFLUENZA A/B, RSV PCR, [facility] - Normal    SARS-CoV-2 Negative  Negative Final    Comment:      INFLUENZA A PCR Negative  Negative Final    Comment:      INFLUENZA B PCR Negative  Negative Final    Comment:      RSV PCR Negative  Negative Final    Comment:      Narrative:     FOR PEDIATRIC PATIENTS - copy/paste COVID Guidelines URL to browser: [email or web address]    SARS-CoV-2 assay is a Nucleic Acid Amplification assay intended for the  qualitative detection of nucleic acid from SARS-CoV-2 in nasopharyngeal  swabs  Results are for the presumptive identification of SARS-CoV-2 RNA  Positive results are indicative of infection with SARS-CoV-2, the virus  causing COVID-19, but do not rule out bacterial infection or co-infection  with other viruses  Laboratories within the [place] and its  territories are required to report all positive results to the appropriate  public health authorities  Negative results do not preclude SARS-CoV-2  infection and should not be used as the sole basis for treatment or other  patient management decisions  Negative results must be combined with  clinical observations, patient history, and epidemiological information  This test has not been FDA cleared or approved  This test has been authorized by FDA under an Emergency Use Authorization  (EUA)  This test is only authorized for the duration of time the  declaration that circumstances exist justifying the authorization of the  emergency use of an in vitro diagnostic tests for detection of SARS-CoV-2  virus and/or diagnosis of COVID-19 infection under section 564(b)(1) of  the Act, 21 U  S C  346QPO-0(P)(1), unless the authorization is terminated  or revoked sooner  The test has been validated but independent review by FDA  and CLIA is pending  Test performed using [employer] GeneXpert: This RT-PCR assay targets N2,  a region unique to SARS-CoV-2  A conserved region in the E-gene was chosen  for pan-Sarbecovirus detection which includes SARS-CoV-2     PROTIME-INR - Normal    Protime 13 9  11 6 - 14 5 seconds Final    INR 1 11  0 84 - 1 19 Final   APTT - Normal    PTT 31  23 - 37 seconds Final    Comment: Therapeutic Heparin Range =  60-90 seconds   LACTIC ACID 2 HOUR - Normal    LACTIC ACID 1 7  0 5 - 2 0 mmol/L Final    Narrative:     Result may be elevated if tourniquet was used during collection  APTT - Normal    PTT 30  23 - 37 seconds Final    Comment: Therapeutic Heparin Range =  60-90 seconds   URINE MICROSCOPIC - Normal    RBC, UA None Seen  None Seen, 2-4 /hpf Final    WBC, UA 2-4  None Seen, 2-4, 5-60 /hpf Final    Epithelial Cells None Seen  None Seen, Occasional /hpf Final    Bacteria, UA None Seen  None Seen, Occasional /hpf Final    Hyaline Casts, UA None Seen  None Seen /lpf Final   MAGNESIUM - Normal    Magnesium 2 0  1 6 - 2 6 mg/dL Final   PHOSPHORUS - Normal    Phosphorus 2 5  2 3 - 4 1 mg/dL Final   BLOOD CULTURE    Blood Culture Received in Microbiology Lab  Culture in Progress  Preliminary   BLOOD CULTURE    Blood Culture Received in Microbiology Lab  Culture in Progress  Preliminary     Time reflects when diagnosis was documented in both MDM as applicable and the Disposition within this note       Time User Action Codes Description Comment    1/16/2022  2:47 PM Rianna Jamilah Add [I21 4] NSTEMI (non-ST elevated myocardial infarction) ([facility] [address] )     1/16/2022  2:47 PM Rianna Jamilah Add [R53 1] Weakness     1/16/2022  2:47 PM Rianna Jamilah Add [R26 2] Ambulatory dysfunction     1/16/2022  2:47 PM Rianna Jamilah Add [N18 9] CKD (chronic kidney disease)     1/16/2022  3:46 PM Khang Blacke Add [R77 8] Elevated troponin           ED Disposition       ED Disposition Condition Date/Time Comment    Admit Stable Sun Jan 16, 2022  2:47 PM Case was discussed with SHANON and the patient's admission status was agreed to be inpt to the service of [facility]              Follow-up Information    None       Current Discharge Medication List        CONTINUE these medications which have NOT CHANGED    Details   losartan (COZAAR) 50 mg tablet TK 1 T PO QD      Multiple Vitamins-Minerals (CENTRUM SILVER ADULT 50+ PO) Take by mouth      omeprazole (PriLOSEC) 20 mg delayed release capsule       senna-docusate sodium (SENOKOT S) 8 6-50 mg per tablet Take 2 tablets by mouth daily at bedtime      traMADol (ULTRAM) 50 mg tablet Take 50 mg by mouth every 8 (eight) hours as needed      Melatonin 1 MG CAPS Take 3 mg by mouth daily at bedtime           No discharge procedures on file  Prior to Admission Medications   Prescriptions Last Dose Informant Patient Reported? Taking? Melatonin 1 MG CAPS Unknown at Unknown time  Yes No   Sig: Take 3 mg by mouth daily at bedtime   Multiple Vitamins-Minerals (CENTRUM SILVER ADULT 50+ PO) Past Week at Unknown time  Yes Yes   Sig: Take by mouth   losartan (COZAAR) 50 mg tablet Past Week at Unknown time  Yes Yes   Sig: TK 1 T PO QD   omeprazole (PriLOSEC) 20 mg delayed release capsule Past Week at Unknown time  Yes Yes   senna-docusate sodium (SENOKOT S) 8 6-50 mg per tablet Past Week at Unknown time  Yes Yes   Sig: Take 2 tablets by mouth daily at bedtime   traMADol (ULTRAM) 50 mg tablet Past Week at Unknown time  Yes Yes   Sig: Take 50 mg by mouth every 8 (eight) hours as needed      Facility-Administered Medications: None       Portions of the record may have been created with voice recognition software  Occasional wrong word or "sound a like" substitutions may have occurred due to the inherent limitations of voice recognition software  Read the chart carefully and recognize, using context, where substitutions have occurred      Electronically signed by:  Aliya Pearl

## 2022-01-16 NOTE — H&P
[facility]  H&P- Cheryle Salaam 1/28/1921, 80 y o  male MRN: [id number]  Unit/Bed#: ED 14 Encounter: [id number]  Primary Care Provider: Reyes Vickers MD   Date and time admitted to hospital: 1/16/2022 12:03 PM    * Ambulatory dysfunction  Assessment & Plan  Patient presenting with 2 day history of weakness  CT head negative for acute intracranial abnormality  Ordered PT/OT/SLP  Continue Fall precaution    Hypertensive emergency  Assessment & Plan  Not well controlled  SBP ranging 185-217 mm Hg on initial presentation  Likely etiology for elevated troponin  Continue home Cozaar 25 mg  Ordered PRN IV Hydralazine SBP >160 mm Hg      Elevated troponin  Assessment & Plan  Present on admission  Initial troponin measured at 54  Patient started on heparin drip in ED  Ordered TTE; consulted Cardiology for further management    Gastroesophageal reflux disease without esophagitis  Assessment & Plan  Home Prilosec non formulary  Started Protonix 40 mg     Stage 3 chronic kidney disease [facility])  Assessment & Plan  Lab Results   Component Value Date    EGFR 33 01/16/2022    EGFR 38 02/26/2021    EGFR 42 02/25/2021    CREATININE 1 67 (H) 01/16/2022    CREATININE 1 50 (H) 02/26/2021    CREATININE 1 37 (H) 02/25/2021     Chronic and stable  Baseline Creatinine approximately 1 5-1 7 since 2021  Continue to monitor BMP    Generalized abdominal pain  Assessment & Plan  Resolved  Present prior to admission  CT C/A/P unable to identified probable cause for abdominal pain  Pain possibly secondary to constipation  Started scheduled Senokot and PRN Miralax      VTE Pharmacologic Prophylaxis: VTE Score: 4 Moderate Risk (Score 3-4) - Pharmacological DVT Prophylaxis Ordered: heparin drip  Code Status: Level 3 - DNAR and DNI   Discussion with family: Updated  (son) via phone      Anticipated Length of Stay: Patient will be admitted on an inpatient basis with an anticipated length of stay of greater than 2 midnights secondary to Ambulatory dysfunction and elevated troponin  Total Time for Visit, including Counseling / Coordination of Care: 30 minutes Greater than 50% of this total time spent on direct patient counseling and coordination of care  Chief Complaint:  Feeling weak and abdominal pain    History of Present Illness:  Taryn Foster is a 80 y o  male with a PMH of HTN, HLD, and CKD Stage 3 who presents with elevated troponin  Per patient and his son, the patient was in his usual state of health until 2 days ago when he complained of abdominal pain and global weakness  Patient denied any confusion, presyncope, or focal weakness  This morning, he felt so weak that he slid from his bed on to the ground and onto his buttocks and left side; he denies head trauma or loss of consciousness  He denies abdominal pain at this time; last bowel movement was 3 days ago, which patient states is unusual for him  He denies fevers, chills, diarrhea, palpitations, bowel/bladder incontinence  ED course: Patient is hemodynamically stable on room air  Labs remarkable for elevated creatinine       Review of Systems:  Review of Systems   All other systems reviewed and are negative  Past Medical and Surgical History:   Past Medical History:   Diagnosis Date    BPH with obstruction/lower urinary tract symptoms     Chronic kidney disease     GERD (gastroesophageal reflux disease)     History of bladder cancer     Hypertension     Spinal stenosis        Past Surgical History:   Procedure Laterality Date    ANKLE ARTHROSCOPY      BLEPHAROPLASTY      GALLBLADDER SURGERY      KNEE ARTHROSCOPY         Meds/Allergies:  Prior to Admission medications    Medication Sig Start Date End Date Taking?  Authorizing Provider   losartan (COZAAR) 50 mg tablet TK 1 T PO QD 9/22/20   Historical Provider, MD   Melatonin 1 MG CAPS Take 3 mg by mouth daily at bedtime    Historical Provider, MD   Multiple Vitamins-Minerals (CENTRUM SILVER ADULT 50+ PO) Take by mouth    Historical Provider, MD   omeprazole (PriLOSEC) 20 mg delayed release capsule  9/22/20   Historical Provider, MD   senna-docusate sodium (SENOKOT S) 8 6-50 mg per tablet Take 2 tablets by mouth daily at bedtime    Historical Provider, MD   traMADol (ULTRAM) 50 mg tablet Take 50 mg by mouth every 8 (eight) hours as needed 9/25/20   Historical Provider, MD     I have reviewed home medications using recent Epic encounter  Allergies: No Known Allergies    Social History:  Marital Status:    Occupation:  Retired  Patient Pre-hospital Living Situation: Home, With other family member: son  Patient Pre-hospital Level of Mobility: walks  Patient Pre-hospital Diet Restrictions: None  Substance Use History:   Social History     Substance and Sexual Activity   Alcohol Use Never     Social History     Tobacco Use   Smoking Status Former Smoker   Smokeless Tobacco Never Used     Social History     Substance and Sexual Activity   Drug Use Never       Family History:  Family History   Problem Relation Age of Onset    Cancer Brother        Physical Exam:     Vitals:   Blood Pressure: (!) 185/80 (01/16/22 1300)  Pulse: 100 (01/16/22 1515)  Temperature: 98 4 °F (36 9 °C) (01/16/22 1209)  Temp Source: Oral (01/16/22 1209)  Respirations: (!) 39 (01/16/22 1515)  Weight - Scale: 93 9 kg (207 lb) (01/16/22 1207)  SpO2: 96 % (01/16/22 1300)    Physical Exam  Vitals and nursing note reviewed  Constitutional:       General: He is not in acute distress  Appearance: Normal appearance  He is normal weight  He is not toxic-appearing  HENT:      Head: Normocephalic and atraumatic  Right Ear: External ear normal       Left Ear: External ear normal       Nose: Nose normal       Mouth/Throat:      Mouth: Mucous membranes are moist    Eyes:      Extraocular Movements: Extraocular movements intact        Conjunctiva/sclera: Conjunctivae normal    Cardiovascular: Rate and Rhythm: Normal rate and regular rhythm  Pulses: Normal pulses  Heart sounds: Normal heart sounds  Pulmonary:      Effort: Pulmonary effort is normal       Breath sounds: Normal breath sounds  Abdominal:      General: Bowel sounds are normal  There is no distension  Palpations: Abdomen is soft  Tenderness: There is no abdominal tenderness  Genitourinary:     Comments: No Guardado present  Musculoskeletal:         General: No swelling  Cervical back: Normal range of motion  Right lower leg: No edema  Left lower leg: No edema  Skin:     General: Skin is warm and dry  Coloration: Skin is not jaundiced  Findings: No bruising  Neurological:      General: No focal deficit present  Mental Status: He is alert and oriented to person, place, and time  Mental status is at baseline  Cranial Nerves: No cranial nerve deficit  Psychiatric:         Mood and Affect: Mood normal          Behavior: Behavior normal          Thought Content:  Thought content normal         Additional Data:     Lab Results:  Results from last 7 days   Lab Units 01/16/22  1230   WBC Thousand/uL 14 50*   HEMOGLOBIN g/dL 15 2   HEMATOCRIT % 46 5   PLATELETS Thousands/uL 213   NEUTROS PCT % 74   LYMPHS PCT % 14   MONOS PCT % 11   EOS PCT % 0     Results from last 7 days   Lab Units 01/16/22  1230   SODIUM mmol/L 134*   POTASSIUM mmol/L 4 1   CHLORIDE mmol/L 102   CO2 mmol/L 24   BUN mg/dL 15   CREATININE mg/dL 1 67*   ANION GAP mmol/L 8   CALCIUM mg/dL 9 2   ALBUMIN g/dL 3 2*   TOTAL BILIRUBIN mg/dL 1 79*   ALK PHOS U/L 156*   ALT U/L 25   AST U/L 54*   GLUCOSE RANDOM mg/dL 113     Results from last 7 days   Lab Units 01/16/22  1230   INR  1 11             Results from last 7 days   Lab Units 01/16/22  1446 01/16/22  1231 01/16/22  1230   LACTIC ACID mmol/L 1 7  --  2 1*   PROCALCITONIN ng/ml  --  0 56*  --        Imaging: Reviewed radiology reports from this admission including: chest CT scan and abdominal/pelvic CT  CT head without contrast   Final Result by Jeannette Alarcon MD (01/16 1421)      No acute intracranial abnormality  Microangiopathic changes  Workstation performed: [id number]         CT chest abdomen pelvis wo contrast   Final Result by Jeannette Alarcon MD (01/16 1432)         1  No acute posttraumatic abnormality identified in the chest, abdomen, or pelvis  2   Nonemergent features is noted  Workstation performed: [id number]         XR chest portable    (Results Pending)       EKG and Other Studies Reviewed on Admission:   · EKG: NSR  HR 70s  ** Please Note: This note has been constructed using a voice recognition system   **

## 2022-01-16 NOTE — ASSESSMENT & PLAN NOTE
Patient presenting with 2 day history of weakness  CT head negative for acute intracranial abnormality  Ordered PT/OT/SLP  Continue Fall precaution

## 2022-01-16 NOTE — ASSESSMENT & PLAN NOTE
Lab Results   Component Value Date    EGFR 33 01/16/2022    EGFR 38 02/26/2021    EGFR 42 02/25/2021    CREATININE 1 67 (H) 01/16/2022    CREATININE 1 50 (H) 02/26/2021    CREATININE 1 37 (H) 02/25/2021     Chronic and stable  Baseline Creatinine approximately 1 5-1 7 since 2021  Continue to monitor BMP

## 2022-01-16 NOTE — ASSESSMENT & PLAN NOTE
Present on admission  Initial troponin measured at 54  Patient started on heparin drip in ED  Ordered TTE; consulted Cardiology for further management

## 2022-01-16 NOTE — ED PROVIDER NOTES
History  Chief Complaint   Patient presents with    Weakness - Generalized     Patient reported to have had weakness worse in the legs for last 2 days  EMS had assisted patient when he slide out of bed yesterday but declined to come to hospital  Patient also reports left lower abdominal and back pain  Sonia Ramos is an 8 y o  year old male with PMHx significant for CKD, HTN, HLD, who presents to the ED today with generalized weakness for 2 days  Patient states that he felt weak when getting out of bed and slid to the ground on his bottom and then onto his left side  Since then he has been having left abdominal/lower chest pain that was not present prior  He is unable to further describe these pains  He denies any head strike or loss of consciousness with these falls  He did not have any promontory symptoms or feel lightheaded  He denies any other recent symptoms or illnesses  Lives at home with his son  The patient denies fevers, chills, headaches, lightheadedness or syncope, nausea, vomiting, chest pain, shortness of breath, cough, changes in usual bowel movements, changes with urination, new or worsened back pain from baseline, pain anywhere else in body  ROS otherwise negative  History provided by:  Medical records and patient   used: No        Prior to Admission Medications   Prescriptions Last Dose Informant Patient Reported? Taking?    Melatonin 1 MG CAPS   Yes No   Sig: Take 3 mg by mouth daily at bedtime   Multiple Vitamins-Minerals (CENTRUM SILVER ADULT 50+ PO)   Yes No   Sig: Take by mouth   losartan (COZAAR) 50 mg tablet   Yes No   Sig: TK 1 T PO QD   omeprazole (PriLOSEC) 20 mg delayed release capsule   Yes No   senna-docusate sodium (SENOKOT S) 8 6-50 mg per tablet   Yes No   Sig: Take 2 tablets by mouth daily at bedtime   traMADol (ULTRAM) 50 mg tablet   Yes No   Sig: Take 50 mg by mouth every 8 (eight) hours as needed      Facility-Administered Medications: None       Past Medical History:   Diagnosis Date    BPH with obstruction/lower urinary tract symptoms     Chronic kidney disease     GERD (gastroesophageal reflux disease)     History of bladder cancer     Hypertension     Spinal stenosis        Past Surgical History:   Procedure Laterality Date    ANKLE ARTHROSCOPY      BLEPHAROPLASTY      GALLBLADDER SURGERY      KNEE ARTHROSCOPY         Family History   Problem Relation Age of Onset    Cancer Brother      I have reviewed and agree with the history as documented  E-Cigarette/Vaping    E-Cigarette Use Never User      E-Cigarette/Vaping Substances    Nicotine No     THC No     CBD No     Flavoring No     Other No     Unknown No      Social History     Tobacco Use    Smoking status: Former Smoker    Smokeless tobacco: Never Used   Vaping Use    Vaping Use: Never used   Substance Use Topics    Alcohol use: Never    Drug use: Never        Review of Systems   Reason unable to perform ROS: please see above for ROS  All other ROS negative  Physical Exam  ED Triage Vitals   Temperature Pulse Respirations Blood Pressure SpO2   01/16/22 1209 01/16/22 1207 01/16/22 1207 01/16/22 1209 01/16/22 1207   98 4 °F (36 9 °C) (!) 109 18 (!) 217/103 96 %      Temp Source Heart Rate Source Patient Position - Orthostatic VS BP Location FiO2 (%)   01/16/22 1209 01/16/22 1207 01/16/22 1207 01/16/22 1207 --   Oral Monitor Lying Right arm       Pain Score       --                    Orthostatic Vital Signs  Vitals:    01/16/22 1207 01/16/22 1209 01/16/22 1300   BP:  (!) 217/103 (!) 185/80   Pulse: (!) 109  90   Patient Position - Orthostatic VS: Lying         Physical Exam  Vitals and nursing note reviewed  Constitutional:       General: He is not in acute distress  Appearance: He is well-developed  He is not diaphoretic  HENT:      Head: Normocephalic and atraumatic        Nose: Nose normal       Mouth/Throat:      Mouth: Mucous membranes are moist    Eyes:      General: No scleral icterus  Conjunctiva/sclera: Conjunctivae normal    Neck:      Vascular: No JVD  Trachea: No tracheal deviation  Cardiovascular:      Rate and Rhythm: Tachycardia present  Rhythm irregular  Pulmonary:      Effort: No respiratory distress  Comments: Tachypneic  Abdominal:      General: There is no distension  Tenderness: There is abdominal tenderness (Left upper quadrant)  Musculoskeletal:         General: No deformity  Cervical back: Normal range of motion and neck supple  Comments: Tenderness to palpation left lateral lower ribs   Skin:     General: Skin is warm and dry  Neurological:      Mental Status: He is alert  Comments: Moves all extremities with 5/5 strength, face symmetric, normal speech, pupils equal round reactive to light, normal extraocular eye movements   Psychiatric:         Behavior: Behavior normal          ED Medications  Medications   multi-electrolyte (ISOLYTE-S PH 7 4) bolus 500 mL (500 mL Intravenous New Bag 1/16/22 1405)   heparin (ACS LOW) (has no administration in time range)       Diagnostic Studies  Results Reviewed     Procedure Component Value Units Date/Time    HS Troponin I 2hr [[id number]] Collected: 01/16/22 1446    Lab Status: In process Specimen: Blood from Arm, Left Updated: 01/16/22 1450    Lactic acid 2 Hours [[id number]] Collected: 01/16/22 1446    Lab Status: In process Specimen: Blood from Arm, Left Updated: 01/16/22 1450    APTT six (6) hours after Heparin bolus/drip initiation or dosing change [[id number]] Collected: 01/16/22 1446    Lab Status: In process Specimen: Blood from Arm, Left Updated: 01/16/22 1450    UA w Reflex to Microscopic w Reflex to Culture [[id number]] Collected: 01/16/22 1446    Lab Status:  In process Specimen: Urine, Other Updated: 01/16/22 1450    HS Troponin I 4hr [[id number]]     Lab Status: No result Specimen: Blood     Lactic acid [[id number]]  (Abnormal) Collected: 01/16/22 1230    Lab Status: Final result Specimen: Blood from Arm, Left Updated: 01/16/22 1328     LACTIC ACID 2 1 mmol/L     Narrative:      Result may be elevated if tourniquet was used during collection  HS Troponin 0hr (reflex protocol) [[id number]]  (Abnormal) Collected: 01/16/22 1230    Lab Status: Final result Specimen: Blood from Arm, Left Updated: 01/16/22 1328     hs TnI 0hr 47 ng/L     COVID19, Influenza A/B, RSV PCR, [facility] - 2 hour STAT [[id number]]  (Normal) Collected: 01/16/22 1230    Lab Status: Final result Specimen: Nares from Nose Updated: 01/16/22 1318     SARS-CoV-2 Negative     INFLUENZA A PCR Negative     INFLUENZA B PCR Negative     RSV PCR Negative    Narrative:      FOR PEDIATRIC PATIENTS - copy/paste COVID Guidelines URL to browser: [email or web address]  "[employer]x    SARS-CoV-2 assay is a Nucleic Acid Amplification assay intended for the  qualitative detection of nucleic acid from SARS-CoV-2 in nasopharyngeal  swabs  Results are for the presumptive identification of SARS-CoV-2 RNA  Positive results are indicative of infection with SARS-CoV-2, the virus  causing COVID-19, but do not rule out bacterial infection or co-infection  with other viruses  Laboratories within the [place] and its  territories are required to report all positive results to the appropriate  public health authorities  Negative results do not preclude SARS-CoV-2  infection and should not be used as the sole basis for treatment or other  patient management decisions  Negative results must be combined with  clinical observations, patient history, and epidemiological information  This test has not been FDA cleared or approved  This test has been authorized by FDA under an Emergency Use Authorization  (EUA)   This test is only authorized for the duration of time the  declaration that circumstances exist justifying the authorization of the  emergency use of an in vitro diagnostic tests for detection of SARS-CoV-2  virus and/or diagnosis of COVID-19 infection under section 564(b)(1) of  the Act, 21 U  S C  496LBV-4(O)(3), unless the authorization is terminated  or revoked sooner  The test has been validated but independent review by FDA  and CLIA is pending  Test performed using [employer] Gene[employer]pert: This RT-PCR assay targets N2,  a region unique to SARS-CoV-2  A conserved region in the E-gene was chosen  for pan-Sarbecovirus detection which includes SARS-CoV-2      Procalcitonin with AM Reflex [[id number]]  (Abnormal) Collected: 01/16/22 1231    Lab Status: Final result Specimen: Blood from Arm, Left Updated: 01/16/22 1312     Procalcitonin 0 56 ng/ml     Procalcitonin Reflex [[id number]]     Lab Status: No result Specimen: Blood     Comprehensive metabolic panel [[id number]]  (Abnormal) Collected: 01/16/22 1230    Lab Status: Final result Specimen: Blood from Arm, Left Updated: 01/16/22 1302     Sodium 134 mmol/L      Potassium 4 1 mmol/L      Chloride 102 mmol/L      CO2 24 mmol/L      ANION GAP 8 mmol/L      BUN 15 mg/dL      Creatinine 1 67 mg/dL      Glucose 113 mg/dL      Calcium 9 2 mg/dL      Corrected Calcium 9 8 mg/dL      AST 54 U/L      ALT 25 U/L      Alkaline Phosphatase 156 U/L      Total Protein 7 8 g/dL      Albumin 3 2 g/dL      Total Bilirubin 1 79 mg/dL      eGFR 33 ml/min/1 73sq m     Narrative:      [place] guidelines for Chronic Kidney Disease (CKD):     Stage 1 with normal or high GFR (GFR > 90 mL/min/1 73 square meters)    Stage 2 Mild CKD (GFR = 60-89 mL/min/1 73 square meters)    Stage 3A Moderate CKD (GFR = 45-59 mL/min/1 73 square meters)    Stage 3B Moderate CKD (GFR = 30-44 mL/min/1 73 square meters)    Stage 4 Severe CKD (GFR = 15-29 mL/min/1 73 square meters)    Stage 5 End Stage CKD (GFR <15 mL/min/1 73 square meters)  Note: GFR calculation is accurate only with a steady state creatinine    Lipase [[id number]] (Abnormal) Collected: 01/16/22 1230    Lab Status: Final result Specimen: Blood from Arm, Left Updated: 01/16/22 1302     Lipase 19 u/L     Protime-INR [[id number]]  (Normal) Collected: 01/16/22 1230    Lab Status: Final result Specimen: Blood from Arm, Left Updated: 01/16/22 1258     Protime 13 9 seconds      INR 1 11    APTT [[id number]]  (Normal) Collected: 01/16/22 1230    Lab Status: Final result Specimen: Blood from Arm, Left Updated: 01/16/22 1258     PTT 31 seconds     Blood culture #2 [[id number]] Collected: 01/16/22 1239    Lab Status: In process Specimen: Blood from Arm, Right Updated: 01/16/22 1249    CBC and differential [[id number]]  (Abnormal) Collected: 01/16/22 1230    Lab Status: Final result Specimen: Blood from Arm, Left Updated: 01/16/22 1239     WBC 14 50 Thousand/uL      RBC 5 00 Million/uL      Hemoglobin 15 2 g/dL      Hematocrit 46 5 %      MCV 93 fL      MCH 30 4 pg      MCHC 32 7 g/dL      RDW 13 3 %      MPV 10 3 fL      Platelets 153 Thousands/uL      nRBC 0 /100 WBCs      Neutrophils Relative 74 %      Immat GRANS % 0 %      Lymphocytes Relative 14 %      Monocytes Relative 11 %      Eosinophils Relative 0 %      Basophils Relative 1 %      Neutrophils Absolute 10 75 Thousands/µL      Immature Grans Absolute 0 05 Thousand/uL      Lymphocytes Absolute 2 09 Thousands/µL      Monocytes Absolute 1 53 Thousand/µL      Eosinophils Absolute 0 01 Thousand/µL      Basophils Absolute 0 07 Thousands/µL     Blood culture #1 [[id number]] Collected: 01/16/22 1230    Lab Status: In process Specimen: Blood from Arm, Left Updated: 01/16/22 1235                 CT head without contrast   Final Result by Joie Beavers MD (01/16 1421)      No acute intracranial abnormality  Microangiopathic changes  Workstation performed: [id number]         CT chest abdomen pelvis wo contrast   Final Result by Joie Beavers MD (01/16 1432)         1    No acute posttraumatic abnormality identified in the chest, abdomen, or pelvis  2   Nonemergent features is noted  Workstation performed: [id number]         XR chest portable    (Results Pending)         Procedures  Procedures      ED Course  ED Course as of 01/16/22 1504   Sun Jan 16, 2022   1223 Procedure Note: EKG  Date/Time: 01/16/22 12:23 PM   Interpreted by: Leo Hope DO  Indications / Diagnosis: CP  ECG reviewed by me, the ED Provider: yes   The EKG demonstrates:  Rhythm: afib, rate 106  Axis: normal axis  Nion-specific intra-ventricular conduction delay  ST Changes: No acute ST Changes, no STD/CHAU       1312 Procalcitonin(!): 0 56   1328 hs TnI 0hr(!!): 54               Identification of Seniors at Risk      Most Recent Value   (ISAR) Identification of Seniors at Risk    Before the illness or injury that brought you to the Emergency, did you need someone to help you on a regular basis? 1 Filed at: 01/16/2022 1211   In the last 24 hours, have you needed more help than usual? 0 Filed at: 01/16/2022 1211   Have you been hospitalized for one or more nights during the past 6 months? 1 Filed at: 01/16/2022 1211   In general, do you see well? 1 Filed at: 01/16/2022 1211   In general, do you have serious problems with your memory? 0 Filed at: 01/16/2022 1211   Do you take more than three different medications every day? 1 Filed at: 01/16/2022 1211   ISAR Score 4 Filed at: 01/16/2022 1211                 Initial Sepsis Screening     Row Name 01/16/22 1221                Is the patient's history suggestive of a new or worsening infection?  No  -KM        Suspected source of infection --        Are two or more of the following signs & symptoms of infection both present and new to the patient? --        Indicate SIRS criteria Tachycardia > 90 bpm;Tachypnea > 20 resp per min  -KM        If the answer is yes to both questions, suspicion of sepsis is present --        If severe sepsis is present AND tissue hypoperfusion perists in the hour after fluid resuscitation or lactate > 4, the patient meets criteria for SEPTIC SHOCK --        Are any of the following organ dysfunction criteria present within 6 hours of suspected infection and SIRS criteria that are NOT considered to be chronic conditions? --        Organ dysfunction --        Date of presentation of severe sepsis --        Time of presentation of severe sepsis --        Tissue hypoperfusion persists in the hour after crystalloid fluid administration, evidenced, by either: --        Was hypotension present within one hour of the conclusion of crystalloid fluid administration? --        Date of presentation of septic shock --        Time of presentation of septic shock --              User Key  (r) = Recorded By, (t) = Taken By, (c) = Cosigned By    [address] Name Provider Type    KATIE Hope, DO Physician                          MDM  Number of Diagnoses or Management Options  Diagnosis management comments: Patient presents with generalized weakness and falls  Has fallen x2 over the last 2 days with no head strike or loss of consciousness  He is not on blood thinners  Will do CT head, CT chest abdomen pelvis, above labs given tachycardia and tachypnea on exam   Will likely require admission for ambulatory dysfunction/general is weakness and inability to receive adequate care at home due to increased need for assistance with ADLs         Amount and/or Complexity of Data Reviewed  Clinical lab tests: ordered and reviewed  Tests in the radiology section of CPT®: ordered and reviewed  Review and summarize past medical records: yes    Risk of Complications, Morbidity, and/or Mortality  Presenting problems: moderate    Patient Progress  Patient progress: stable      Disposition  Final diagnoses:   NSTEMI (non-ST elevated myocardial infarction) ([facility] [address] )   Weakness   Ambulatory dysfunction   CKD (chronic kidney disease)     Time reflects when diagnosis was documented in both MDM as applicable and the Disposition within this note     Time User Action Codes Description Comment    1/16/2022  2:47 PM Arcelia Patella Add [I21 4] NSTEMI (non-ST elevated myocardial infarction) ([facility] [address] )     1/16/2022  2:47 PM Arcelia Patella Add [R53 1] Weakness     1/16/2022  2:47 PM Arcelia Patella Add [R26 2] Ambulatory dysfunction     1/16/2022  2:47 PM Arcelia Patella Add [N18 9] CKD (chronic kidney disease)       ED Disposition     ED Disposition Condition Date/Time Comment    Admit Stable Sun Jan 16, 2022  2:47 PM Case was discussed with SHANON and the patient's admission status was agreed to be inpt to the service of [facility]  Follow-up Information    None         Patient's Medications   Discharge Prescriptions    No medications on file     No discharge procedures on file  PDMP Review     None           ED Provider  Attending physically available and evaluated Elle Landis I managed the patient along with the ED Attending      Electronically Signed by         Ada Hernadez DO  01/16/22 0048

## 2022-01-17 ENCOUNTER — APPOINTMENT (INPATIENT)
Dept: NON INVASIVE DIAGNOSTICS | Facility: HOSPITAL | Age: 87
DRG: 392 | End: 2022-01-17
Payer: MEDICARE

## 2022-01-17 LAB
ANION GAP SERPL CALCULATED.3IONS-SCNC: 7 MMOL/L (ref 4–13)
AORTIC ROOT: 3.3 CM
APICAL FOUR CHAMBER EJECTION FRACTION: 66 %
ASCENDING AORTA: 3.7 CM
BASOPHILS # BLD AUTO: 0.07 THOUSANDS/ΜL (ref 0–0.1)
BASOPHILS NFR BLD AUTO: 1 % (ref 0–1)
BUN SERPL-MCNC: 18 MG/DL (ref 5–25)
CALCIUM SERPL-MCNC: 8.6 MG/DL (ref 8.3–10.1)
CHLORIDE SERPL-SCNC: 103 MMOL/L (ref 100–108)
CO2 SERPL-SCNC: 23 MMOL/L (ref 21–32)
CREAT SERPL-MCNC: 1.54 MG/DL (ref 0.6–1.3)
E WAVE DECELERATION TIME: 279 MS
EOSINOPHIL # BLD AUTO: 0.02 THOUSAND/ΜL (ref 0–0.61)
EOSINOPHIL NFR BLD AUTO: 0 % (ref 0–6)
ERYTHROCYTE [DISTWIDTH] IN BLOOD BY AUTOMATED COUNT: 13.2 % (ref 11.6–15.1)
FRACTIONAL SHORTENING: 21 % (ref 28–44)
GFR SERPL CREATININE-BSD FRML MDRD: 36 ML/MIN/1.73SQ M
GLUCOSE SERPL-MCNC: 105 MG/DL (ref 65–140)
HCT VFR BLD AUTO: 43.8 % (ref 36.5–49.3)
HGB BLD-MCNC: 14.3 G/DL (ref 12–17)
IMM GRANULOCYTES # BLD AUTO: 0.04 THOUSAND/UL (ref 0–0.2)
IMM GRANULOCYTES NFR BLD AUTO: 0 % (ref 0–2)
INTERVENTRICULAR SEPTUM IN DIASTOLE (PARASTERNAL SHORT AXIS VIEW): 1.6 CM
LEFT ATRIUM AREA SYSTOLE SINGLE PLANE A4C: 26.1 CM2
LEFT ATRIUM SIZE: 4.4 CM
LEFT INTERNAL DIMENSION IN SYSTOLE: 2.7 CM (ref 2.1–4)
LEFT VENTRICULAR INTERNAL DIMENSION IN DIASTOLE: 3.4 CM (ref 5.35–7.97)
LEFT VENTRICULAR POSTERIOR WALL IN END DIASTOLE: 1.3 CM
LEFT VENTRICULAR STROKE VOLUME: 21 ML
LYMPHOCYTES # BLD AUTO: 1.48 THOUSANDS/ΜL (ref 0.6–4.47)
LYMPHOCYTES NFR BLD AUTO: 12 % (ref 14–44)
MAGNESIUM SERPL-MCNC: 2 MG/DL (ref 1.6–2.6)
MCH RBC QN AUTO: 30.2 PG (ref 26.8–34.3)
MCHC RBC AUTO-ENTMCNC: 32.6 G/DL (ref 31.4–37.4)
MCV RBC AUTO: 93 FL (ref 82–98)
MONOCYTES # BLD AUTO: 1.39 THOUSAND/ΜL (ref 0.17–1.22)
MONOCYTES NFR BLD AUTO: 11 % (ref 4–12)
MV E'TISSUE VEL-SEP: 4 CM/S
MV PEAK A VEL: 1.18 M/S
MV PEAK E VEL: 92 CM/S
MV STENOSIS PRESSURE HALF TIME: 0 MS
NEUTROPHILS # BLD AUTO: 9.15 THOUSANDS/ΜL (ref 1.85–7.62)
NEUTS SEG NFR BLD AUTO: 76 % (ref 43–75)
NRBC BLD AUTO-RTO: 0 /100 WBCS
PHOSPHATE SERPL-MCNC: 2.5 MG/DL (ref 2.3–4.1)
PLATELET # BLD AUTO: 192 THOUSANDS/UL (ref 149–390)
PMV BLD AUTO: 10.6 FL (ref 8.9–12.7)
POTASSIUM SERPL-SCNC: 4.2 MMOL/L (ref 3.5–5.3)
PROCALCITONIN SERPL-MCNC: 0.37 NG/ML
RBC # BLD AUTO: 4.73 MILLION/UL (ref 3.88–5.62)
RIGHT ATRIUM AREA SYSTOLE A4C: 16.7 CM2
RIGHT VENTRICLE ID DIMENSION: 2.8 CM
SL CV LV EF: 66
SL CV PED ECHO LEFT VENTRICLE DIASTOLIC VOLUME (MOD BIPLANE) 2D: 47 ML
SL CV PED ECHO LEFT VENTRICLE SYSTOLIC VOLUME (MOD BIPLANE) 2D: 26 ML
SODIUM SERPL-SCNC: 133 MMOL/L (ref 136–145)
TR MAX PG: 23 MMHG
TRICUSPID VALVE PEAK REGURGITATION VELOCITY: 2.4 M/S
WBC # BLD AUTO: 12.15 THOUSAND/UL (ref 4.31–10.16)
Z-SCORE OF LEFT VENTRICULAR DIMENSION IN END SYSTOLE: -6.52

## 2022-01-17 PROCEDURE — 93306 TTE W/DOPPLER COMPLETE: CPT

## 2022-01-17 PROCEDURE — 80048 BASIC METABOLIC PNL TOTAL CA: CPT | Performed by: INTERNAL MEDICINE

## 2022-01-17 PROCEDURE — 84100 ASSAY OF PHOSPHORUS: CPT | Performed by: INTERNAL MEDICINE

## 2022-01-17 PROCEDURE — 99232 SBSQ HOSP IP/OBS MODERATE 35: CPT | Performed by: PHYSICIAN ASSISTANT

## 2022-01-17 PROCEDURE — 97163 PT EVAL HIGH COMPLEX 45 MIN: CPT

## 2022-01-17 PROCEDURE — 99222 1ST HOSP IP/OBS MODERATE 55: CPT | Performed by: INTERNAL MEDICINE

## 2022-01-17 PROCEDURE — 93306 TTE W/DOPPLER COMPLETE: CPT | Performed by: INTERNAL MEDICINE

## 2022-01-17 PROCEDURE — 36415 COLL VENOUS BLD VENIPUNCTURE: CPT | Performed by: INTERNAL MEDICINE

## 2022-01-17 PROCEDURE — 83735 ASSAY OF MAGNESIUM: CPT | Performed by: INTERNAL MEDICINE

## 2022-01-17 PROCEDURE — 97167 OT EVAL HIGH COMPLEX 60 MIN: CPT

## 2022-01-17 PROCEDURE — 84145 PROCALCITONIN (PCT): CPT | Performed by: INTERNAL MEDICINE

## 2022-01-17 PROCEDURE — 85025 COMPLETE CBC W/AUTO DIFF WBC: CPT | Performed by: INTERNAL MEDICINE

## 2022-01-17 PROCEDURE — 92610 EVALUATE SWALLOWING FUNCTION: CPT

## 2022-01-17 RX ADMIN — LOSARTAN POTASSIUM 25 MG: 25 TABLET, FILM COATED ORAL at 09:15

## 2022-01-17 RX ADMIN — HEPARIN SODIUM 5000 UNITS: 5000 INJECTION INTRAVENOUS; SUBCUTANEOUS at 16:43

## 2022-01-17 RX ADMIN — DOCUSATE SODIUM AND SENNOSIDES 2 TABLET: 8.6; 5 TABLET ORAL at 22:16

## 2022-01-17 RX ADMIN — HEPARIN SODIUM 5000 UNITS: 5000 INJECTION INTRAVENOUS; SUBCUTANEOUS at 07:14

## 2022-01-17 RX ADMIN — Medication 6 MG: at 22:16

## 2022-01-17 RX ADMIN — HEPARIN SODIUM 5000 UNITS: 5000 INJECTION INTRAVENOUS; SUBCUTANEOUS at 22:17

## 2022-01-17 RX ADMIN — PANTOPRAZOLE SODIUM 40 MG: 40 TABLET, DELAYED RELEASE ORAL at 07:13

## 2022-01-17 NOTE — ASSESSMENT & PLAN NOTE
· Present on admission, initial troponin measured at 54  · Patient started on heparin drip in ED, which has since been discontinued   · EKG without ischemic changes   · Appreciate cardiology inputs, likely 2/2 hypertensive urgency on admission with underlying CKD   · Echocardiogram ordered and pending

## 2022-01-17 NOTE — CASE MANAGEMENT
Case Management Assessment & Discharge Planning Note    Patient name Gretchen Oviedo  Location CW2 210/CW2 620-67 MRN [id number]  : 1921 Date 2022       Current Admission Date: 2022  Current Admission Diagnosis:Ambulatory dysfunction   Patient Active Problem List    Diagnosis Date Noted    Elevated troponin 2022    Generalized abdominal pain 2022    Stage 3 chronic kidney disease ([facility] [address] ) 2022    Dyspnea on exertion 2021    Generalized weakness 2021    Goals of care, counseling/discussion 2021    Constipation 2021    Persistent dry cough 2021    Insomnia 2021    Ambulatory dysfunction 2021    Multiple falls 2021    Hypertensive emergency 2021    Chronic midline thoracic back pain 2021    Gastroesophageal reflux disease without esophagitis 2021    History of bladder cancer 2021    Acute kidney injury superimposed on chronic kidney disease ([facility] [address] ) 2021      LOS (days): 1  Geometric Mean LOS (GMLOS) (days): 2 60  Days to GMLOS:1 5     OBJECTIVE:    Risk of Unplanned Readmission Score: 12         Current admission status: Inpatient  Referral Reason: Other (DC planning)    Preferred Pharmacy:   [id number] [address] - [address]  [address]  [address] [facility]  Phone: [phone] Fax: [phone]    Primary Care Provider: Huong Cruz MD    Primary Insurance: MEDICARE  Secondary Insurance: [address]    ASSESSMENT:  [address], [facility] - Daughter   Primary Phone: [phone] (Mobile)               Advance Directives  Does patient have a [address]?: Yes  Does patient have Advance Directives?: Yes  Advance Directives: Power of  for health care  Primary Contact: Daughter Laureano Hunt         Readmission Root Cause  30 Day Readmission: No    Patient Information  Admitted from[de-identified] Home  Mental Status: Alert  During Assessment patient was accompanied by: Not accompanied during assessment  Assessment information provided by[de-identified] Patient  Primary Caregiver: Family  Caregiver's Name[de-identified] Son Katlin Hatch Relationship to Patient[de-identified] Family Member  Caregiver's Telephone Number[de-identified] [phone]  Support Systems: [facility] of Residence: [address] [place] [address] do you live in?: [employer] entry access options   Select all that apply : Stairs  Number of steps to enter home : 7  Do the steps have railings?: Yes  Type of Current Residence: 2 story home  Upon entering residence, is there a bedroom on the main floor (no further steps)?: No  A bedroom is located on the following floor levels of residence (select all that apply):: 2nd Floor  Upon entering residence, is there a bathroom on the main floor (no further steps)?: No  Indicate which floors of current residence have a bathroom (select all the apply):: 2nd Floor  Number of steps to 2nd floor from main floor: One Flight  In the last 12 months, was there a time when you were not able to pay the mortgage or rent on time?: No  In the last 12 months, how many places have you lived?: 1  In the last 12 months, was there a time when you did not have a steady place to sleep or slept in a shelter (including now)?: No  Homeless/housing insecurity resource given?: N/A  Living Arrangements: Lives Alone (pt lives alone but son comes daily to assist the pt)  Is patient a ?: No    Activities of Daily Living Prior to Admission  Functional Status: Assistance  Completes ADLs independently?: No  Level of ADL dependence: Assistance  Ambulates independently?: No  Level of ambulatory dependence: Assistance  Does patient use assisted devices?: Yes  Assisted Devices (DME) used: Walker,Straight Cane,Shower Chair  Does patient currently own DME?: Yes  What DME does the patient currently own?: Straight Cane,Shower Chair  Does patient have a history of Outpatient Therapy (PT/OT)?: No  Does the patient have a history of Short-Term Rehab?: Yes [facility])  Does patient have a history of HHC?: Yes (cant recall the agency)  Does patient currently have [facility]?: No         Patient Information Continued  Income Source: Pension/jail  Does patient have prescription coverage?: Yes  Within the past 12 months, you worried that your food would run out before you got the money to buy more : Never true  Within the past 12 months, the food you bought just didnt last and you didnt have money to get more : Never true  Food insecurity resource given?: N/A  Does patient receive dialysis treatments?: No  Does patient have a history of substance abuse?: No  Does patient have a history of Mental Health Diagnosis?: No         Means of Transportation  Means of Transport to Appts[de-identified] Family transport  In the past 12 months, has lack of transportation kept you from medical appointments or from getting medications?: No  In the past 12 months, has lack of transportation kept you from meetings, work, or from getting things needed for daily living?: No  Was application for public transport provided?: N/A        DISCHARGE DETAILS:    Discharge planning discussed with[de-identified] Daughter Maru Loaiza of Choice: Yes  Comments - Freedom of Choice: discussed [address]  CM contacted family/caregiver?: Yes (daughter Eros Whitney)  Were Treatment Team discharge recommendations reviewed with patient/caregiver?: Yes  Did patient/caregiver verbalize understanding of patient care needs?: Yes  Were patient/caregiver advised of the risks associated with not following Treatment Team discharge recommendations?: Yes    Contacts  Patient Contacts: Eros Whitney  Relationship to Patient[de-identified] Family  Contact Method: Phone  Phone Number: [phone]  Reason/Outcome: Continuity of   [address]         Is the patient interested in [facility] at discharge?: No    DME Referral Provided  Referral made for DME?: No    Other Referral/Resources/Interventions Provided:  Interventions: Short Term Rehab  Referral Comments: PT recs for STR, discussed with pt's daughter Roseanna Milian as pt did not answer his room telephone  Dtr states pt was at [facility] and stated he shouldve stayed there  Dtr agreeable with STR but would like it to be pt's choice  Would you like to participate in our [address] service program?  : No - Declined    Treatment Team Recommendation: Short Term Rehab  Discharge Destination Plan[de-identified] Short Term Rehab  Transport at Discharge : BLS Ambulance                                      Additional Comments: Son Clementina Noble goes daily ot assist the pt and daughter assists with household chores ,transport but neither can take care of the pt fulltime

## 2022-01-17 NOTE — SPEECH THERAPY NOTE
Speech-Language Pathology Bedside Swallow Evaluation    Patient Name: Brady Diane    FOMNP'H Date: 1/17/2022     Problem List  Principal Problem:    Ambulatory dysfunction  Active Problems:    Hypertensive emergency    Gastroesophageal reflux disease without esophagitis    Elevated troponin    Generalized abdominal pain    Stage 3 chronic kidney disease (HCC)      Past Medical History  Past Medical History:   Diagnosis Date    BPH with obstruction/lower urinary tract symptoms     Chronic kidney disease     GERD (gastroesophageal reflux disease)     History of bladder cancer     Hypertension     Spinal stenosis        Past Surgical History  Past Surgical History:   Procedure Laterality Date    ANKLE ARTHROSCOPY      BLEPHAROPLASTY      GALLBLADDER SURGERY      KNEE ARTHROSCOPY         Summary  Pt presented with s/s suggestive of mild oropharyngeal dysphagia  Symptoms or concerns included decreased mastication, disorganized oral phase, suspected mild pharyngeal swallow delay and mildly decreased hyolaryngeal elevation upon palpation  Pt reported he has some trouble chewing but otherwise was fairly independent prior to his recent fall  Pt was agreeable to recommendation for dysphagia 3 dental soft diet and thin liquids  He is requesting feeding assistance  ST will f/u for diet tolerance and potential to advance as able  Risk/s for Aspiration: suspect low    Recommended Diet: soft/level 3 diet and thin liquids   Recommended Form of Meds: as tolerated   Aspiration precautions and swallowing strategies: upright posture, only feed when fully alert, slow rate of feeding, small bites/sips and alternating bites and sips  Other Recommendations: Continue frequent oral care      Current Medical Status per H&P 1/16/22  Brady Diane is a 80 y o  male with a PMH of HTN, HLD, and CKD Stage 3 who presents with elevated troponin   Per patient and his son, the patient was in his usual state of health until 2 days ago when he complained of abdominal pain and global weakness  Patient denied any confusion, presyncope, or focal weakness  This morning, he felt so weak that he slid from his bed on to the ground and onto his buttocks and left side; he denies head trauma or loss of consciousness  He denies abdominal pain at this time; last bowel movement was 3 days ago, which patient states is unusual for him  He denies fevers, chills, diarrhea, palpitations, bowel/bladder incontinence  Special Studies:  CXR 1/16/22 IMPRESSION:  Minimal bibasilar atelectasis  CT head 1/16/22 IMPRESSION:  No acute intracranial abnormality  Microangiopathic changes  Social/Education/Vocational Hx:  Pt lives with children    Swallow Information   Current Risks for Dysphagia & Aspiration: recent decline, pt requesting feeding assistance  Current Diet: regular diet and thin liquids   Baseline Diet: regular diet and thin liquids      Baseline Assessment   Behavior/Cognition: alert  Speech/Language Status: able to participate in conversation and able to follow commands  Patient Positioning: upright in bed, leaning to R side  Pain Status/Interventions/Response to Interventions: No report of or nonverbal indications of pain  Swallow Mechanism Exam  Facial: symmetrical  Labial: WFL  Lingual: WFL  Velum: symmetrical  Mandible: adequate ROM  Dentition: full dentures, loose fitting, pt reported they are 48years old  Vocal quality:clear/adequate   Volitional Cough: adequate   Respiratory Status: on RA      Consistencies Assessed and Performance   Consistencies Administered: thin liquids, soft solids and hard solids  Materials administered included water, rice, chicken breast    Oral Stage: mild  Mastication was disorganized and prolonged with the materials administered today  Bolus formation and transfer were reduced but functional  Oral residual cleared with liquid wash      Pharyngeal Stage: mild  Swallow Mechanics: Swallowing initiation appeared mildly delayed  Laryngeal rise was palpated and judged to be within functional limits  No coughing, throat clearing, change in vocal quality or respiratory status noted today  Esophageal Concerns: hx GERD      Summary and Recommendations (see above)    Results Reviewed with: patient and RN     Treatment Recommended: yes     Frequency of treatment: 2-3x f/u    Dysphagia LTG  -Patient will demonstrate safe and effective oral intake (without overt s/s significant oral/pharyngeal dysphagia including s/s penetration or aspiration) for the highest appropriate diet level  Short Term Goals:  -Pt will tolerate Dysphagia 3/advanced (dental soft) diet and thin liquid with no significant s/s oral or pharyngeal dysphagia across 1-3 diagnostic sessions

## 2022-01-17 NOTE — PHYSICAL THERAPY NOTE
Physical Therapy Evaluation    Patient's Name: Sonia Ramos    Admitting Diagnosis  Weakness [R53 1]  CKD (chronic kidney disease) [N18 9]  Elevated troponin [R77 8]  NSTEM[facility] (non-ST elevated [facility]) [facility] ) [I21 4]  Unable to ambulate [R26 2]  Ambulatory dysfunction [R26 2]    Problem List  Patient Active Problem List   Diagnosis    Ambulatory dysfunction    Multiple falls    Hypertensive emergency    Chronic midline thoracic back pain    Gastroesophageal reflux disease without esophagitis    History of bladder cancer    Acute kidney injury superimposed on chronic kidney disease (HCC)    Constipation    Persistent dry cough    Insomnia    Dyspnea on exertion    Generalized weakness    Goals of care, counseling/discussion    Elevated troponin    Generalized abdominal pain    Stage 3 chronic kidney disease ([facility] )       Past Medical History  Past Medical History:   Diagnosis Date    BPH with obstruction/lower urinary tract symptoms     Chronic kidney disease     GERD (gastroesophageal reflux disease)     History of bladder cancer     Hypertension     Spinal stenosis        Past Surgical History  Past Surgical History:   Procedure Laterality Date    ANKLE ARTHROSCOPY      BLEPHAROPLASTY      GALLBLADDER SURGERY      KNEE ARTHROSCOPY          01/17/22 1120   PT Last Visit   PT Visit Date 01/17/22   Note Type   Note type Evaluation   Pain Assessment   Pain Assessment Tool 0-10   Pain Score 3   Pain Location/Orientation Location: Abdomen   Patient's Stated Pain Goal No pain   Hospital Pain Intervention(s) Repositioned   Restrictions/Precautions   Weight Bearing Precautions Per Order No   Other Precautions Cognitive; Chair Alarm; Bed Alarm;Multiple lines; Fall Risk;Pain;Telemetry   Home Living   Type of Home House   Additional Comments Resides w/ son in 2 story home  Rinku Shine is home during the day and can assist   ambulates w/ rollator  Does normally ambulate the stairs       Prior Function   Level of [place] Independent with ADLs and functional mobility   Falls in the last 6 months 1 to 4   General   Family/Caregiver Present No   Cognition   Overall Cognitive Status Impaired   Arousal/Participation Responsive   Attention Attends with cues to redirect   Orientation Level Oriented X4   Memory Unable to assess   Following Commands Follows one step commands with increased time or repetition   Subjective   Subjective cooperative w/ session, states he feels very weak   RLE Assessment   RLE Assessment   (strength grossly 3-/5, assessed w/ mobility)   LLE Assessment   LLE Assessment   (strength grossly 3-/5- assessed w mobility)   Coordination   Movements are Fluid and Coordinated 0   Bed Mobility   Supine to Sit 3  Moderate assistance   Additional items Assist x 2   Sit to Supine 2  Maximal assistance   Additional items Assist x 2   Additional Comments sat EOB x several minutes  maintains w/ mod/max A of 1   moderate R sided lean   cues and facilitation used to correct   Transfers   Sit to Stand 3  Moderate assistance   Additional items Assist x 2   Stand to Sit 3  Moderate assistance   Additional items Assist x 2   Additional Comments standing trial x 1 w/ RW for 30 sec   continued R lean in standing w/ R sided and posterior LOB  did not transition to chair due to safety concerns  repositioned in supine   Balance   Static Sitting Poor   Dynamic Sitting Poor -   Static Standing Poor   Activity Tolerance   Activity Tolerance Patient limited by fatigue;Patient limited by pain;Treatment limited secondary to medical complications (Comment)   Nurse Made Aware yes   Assessment   Prognosis Fair   Problem List Decreased strength;Decreased endurance; Impaired balance;Decreased mobility; Decreased coordination;Decreased cognition; Impaired judgement;Decreased safety awareness;Pain   Assessment Pt seen for physical therapy evaluation, portion of which was performed as co-evaluation w/ OT due to concerns re: medical status  PT portion of evaluation focused on mobility assessment where OT portion focused on ADLs, self care  Pt is a 79 y/o male w/ history/comorbidities of HTN, HLD, CKD, BPH, spinal stenosis, bladder CA who is now admitted w/ weakness, abdominal pain, fall after he slid OOB w/ progressive mobility decline  Found to have elevated troponins- dx is non ischemic myocardial injury in setting of hypertensive emergency  Due to acute medical issues, pain, fall risk, note unstable clinical picture  PT consulted to assess mobility, d/c needs  Pt presents w/ decreased functional mob, standing balance, endurance, B LE strength, barriers at home  Pt will benefit from skilled PT to correct for the above problems  Recommend rehab at d/c   Goals   Patient Goals to feel better   STG Expiration Date 01/31/22   Short Term Goal #1 1-2 wks: bed mob and transfers w/ min A of 1, standing balance to fair/fair - w/ device, ambulate  ft w/ RW and min A, increase B LE strength by 1/2 -1 grade, ambulate 1 flight of stairs w/ min A   PT Treatment Day 0   Plan   Treatment/Interventions Functional transfer training;LE strengthening/ROM; Elevations; Therapeutic exercise; Endurance training;Gait training;Patient/family training;Equipment eval/education; Bed mobility   PT Frequency 3-5x/wk   Recommendation   PT Discharge Recommendation Post acute rehabilitation services   AM-PAC Basic Mobility Inpatient   Turning in Bed Without Bedrails 2   Lying on Back to Sitting on Edge of Flat Bed 1   Moving Bed to Chair 1   Standing Up From Chair 1   Walk in Room 1   Climb 3-5 Stairs 1   Basic Mobility Inpatient Raw Score 7   Turning Head Towards Sound 4   Follow Simple Instructions 3   Low Function Basic Mobility Raw Score 14   Low Function Basic Mobility Standardized Score 22 01   Highest Level Of Mobility   JH-HLM Goal 2: Bed activities/Dependent transfer   JH-HLM Highest Level of Mobility 3: Sit at edge of bed   JH-HLM Goal Achieved Yes           Brigitte Del Rio PT, DPT, CSRS

## 2022-01-17 NOTE — ED NOTES
The following tiger text sent to provider ED Bill [id number]  Juana Male, 89 y  o , 1/28/1921 MRN: [id number] trop [address] Gastelum RN  01/16/22 2037       Meera Arreguin RN  01/16/22 2037

## 2022-01-17 NOTE — ASSESSMENT & PLAN NOTE
· Resolved  · Present prior to admission  · CT C/A/P unable to identified probable cause for abdominal pain  · Pain possibly secondary to constipation  · Continue bowel regimen with scheduled Senokot and PRN Miralax

## 2022-01-17 NOTE — OCCUPATIONAL THERAPY NOTE
Occupational Therapy Evaluation     Patient Name: Karen Washington  VDMCR'C Date: 1/17/2022  Problem List  Principal Problem:    Ambulatory dysfunction  Active Problems:    Hypertensive emergency    Gastroesophageal reflux disease without esophagitis    Elevated troponin    Generalized abdominal pain    Stage 3 chronic kidney disease ([address] )    Past Medical History  Past Medical History:   Diagnosis Date    BPH with obstruction/lower urinary tract symptoms     Chronic kidney disease     GERD (gastroesophageal reflux disease)     History of bladder cancer     Hypertension     Spinal stenosis      Past Surgical History  Past Surgical History:   Procedure Laterality Date    ANKLE ARTHROSCOPY      BLEPHAROPLASTY      GALLBLADDER SURGERY      KNEE ARTHROSCOPY             01/17/22 1124   OT Last Visit   OT Visit Date 01/17/22   Note Type   Note type Evaluation   Restrictions/Precautions   Weight Bearing Precautions Per Order No   Other Precautions Bed Alarm; Fall Risk;Pain;Multiple lines; Chair Alarm   Pain Assessment   Pain Assessment Tool 0-10   Pain Score 3   Pain Location/Orientation Location: Abdomen   Hospital Pain Intervention(s) Repositioned; Ambulation/increased activity   Home Living   Type of [address] [place] [address] Two level;Bed/bath upstairs  (1 CHAU)   Bathroom Shower/Tub Walk-in shower   Bathroom Toilet Raised   Bathroom Equipment Shower chair  (reports use PTA)   Home Equipment Walker;Cane  (reports use of rollator PTA)   Prior Function   Level of [place] Independent with ADLs and functional mobility   Lives With Son   Receives Help From Family   ADL Assistance Independent   IADLs Needs assistance   Falls in the last 6 months 1 to 4  (1 per pt)   Comments pt reports his son is home throughout the day and able to assist as needed   Lifestyle   Autonomy  At baseline pt was completing ADLs IND, IADLs with assist, ambulating MOD IND with rollator, (-) driving     Reciprocal Relationships supportive son   Service to Others retired   Intrinsic Gratification enjoys watching TV   Psychosocial   Psychosocial (WDL) WDL   Subjective   Subjective "I can't stand" - pt reports he is independent at baseline but since his fall hasn't been able to stand   ADL   [address]; Increased time to complete  (frequent spills with utensils)   Grooming Assistance 4  Minimal Assistance   UB Bathing Assistance 3  Moderate Assistance   LB Bathing Assistance 2  Maximal Assistance   UB Dressing Assistance 3  Moderate Assistance   LB Dressing Assistance 2  Maximal Assistance   Toileting Assistance  2  Maximal Assistance   Bed Mobility   Supine to Sit 3  Moderate assistance   Additional items Assist x 2; Increased time required;Verbal cues;LE management   Sit to Supine 2  Maximal assistance   Additional items Assist x 2; Increased time required;Verbal cues;LE management   Additional Comments leaning to R seated EOB   Transfers   Sit to Stand 3  Moderate assistance   Additional items Assist x 2; Increased time required;Verbal cues   Stand to Sit 3  Moderate assistance   Additional items Assist x 2; Increased time required;Verbal cues   Additional Comments RW - VCs for safe hand placement   Balance   Static Sitting Poor   Static Standing Poor   Activity Tolerance   Activity Tolerance Patient limited by fatigue   Medical Staff Made Aware PT   Nurse Made Aware Per RN pt appropriate to be seen   RUE Assessment   RUE Assessment X  (decreased shoulder AROM, grasp 4/5)   LUE Assessment   LUE Assessment X  (decreased shoulder AROM, grasp 4/5)   Hand Function   Fine Motor Coordination Impaired   Cognition   Arousal/Participation Alert; Cooperative   Attention Attends with cues to redirect   Orientation Level Oriented X4   Memory Decreased recall of recent events   Following Commands Follows one step commands without difficulty   Assessment   Limitation Decreased ADL status; Decreased UE ROM; Decreased UE strength;Decreased cognition;Decreased endurance;Decreased self-care trans;Decreased high-level ADLs; Decreased fine motor control   Prognosis Good   Assessment Pt is a 80 y o  male who was admitted to [facility] on 1/16/2022 with generalized weakness, Ambulatory dysfunction, abdominal pain  Pt slid out of bed onto ground, (-) headstrike, (-) LOC  Pt diagnosed with elevated troponin, non-ischemic myocardial injury, hypertensive emergency  Pt  has a past medical history of BPH with obstruction/lower urinary tract symptoms, Chronic kidney disease, GERD (gastroesophageal reflux disease), History of bladder cancer, Hypertension, and Spinal stenosis  At baseline pt was completing ADLs IND, IADLs with assist, ambulating MOD IND with rollator, (-) driving  Pt lives with his son in a 2 SH with 2nd floor bed/bathroom  Currently pt requires MOD-MAX A for overall ADLS and MOD Ax2 for functional transfers  Pt currently presents with impairments in the following categories -steps to enter environment and difficulty performing ADLS activity tolerance, endurance, standing balance/tolerance, sitting balance/tolerance, UE strength, UE ROM, FMC, memory, safety  and attention   These impairments, as well as pt's fatigue, pain, risk for falls and home environment  limit pt's ability to safely engage in all baseline areas of occupation, includingeating, grooming, bathing, dressing, toileting, functional mobility/transfers, community mobility and leisure activities  From OT standpoint, recommend [id number] upon D/C  OT will continue to follow to address the below stated goals  Goals   Patient Goals to go home   LTG Time Frame 10-14   Long Term Goal #1 see goals below   Plan   Treatment Interventions ADL retraining;Functional transfer training;UE strengthening/ROM; Endurance training;Cognitive reorientation;Patient/family training;Equipment evaluation/education; Fine motor coordination activities; Compensatory technique education; Energy conservation; Activityengagement   Goal Expiration Date 01/31/22   OT Frequency 3-5x/wk   Recommendation   OT Discharge Recommendation Post acute rehabilitation services   AM-PAC Daily Activity Inpatient   Lower Body Dressing 2   Bathing 2   Toileting 2   Upper Body Dressing 2   Grooming 3   Eating 3   Daily Activity Raw Score 14   Daily Activity Standardized Score (Calc for Raw Score >=11) 33 39   AM-PAC Applied Cognition Inpatient   Following a Speech/Presentation 3   Understanding Ordinary Conversation 4   Taking Medications 4   Remembering Where Things Are Placed or Put Away 4   Remembering List of 4-5 Errands 3   Taking Care of Complicated Tasks 3   Applied Cognition Raw Score 21   Applied Cognition Standardized Score 44 3   Modified Carlie Scale   Modified Carlie Scale 4       The patient's raw score on the AM-PAC Daily Activity inpatient short form is 14, standardized score is 33 39, less than 39 4  Patients at this level are likely to benefit from discharge to post-acute rehabilitation services  Please refer to the recommendation of the Occupational Therapist for safe discharge planning  GOALS     Pt will improve activity tolerance to G for min 30 min txment sessions     Pt will complete UB ADLs with MOD IND and use of adaptive device and DME as needed     Pt will complete LB ADLs with MOD IND w/ use of AE and DME as needed     Pt will complete toileting w/ MOD IND w/ G hygiene/thoroughness using DME as needed     Pt will improve functional mobility/transfers to Mod I on/off all surfaces using DME as needed w/ G balance/safety      Pt will demonstrate G carryover of pt/caregiver education and training as appropriate w/ mod I w/o cues w/ good tolerance     Pt to participate in ongoing functional cognitive assessment with Good attention/participation to assist with safe D/C planning  Pt will complete grooming & feeding with MOD IND w/ use of AE as needed      Pt will improve bed mobility to MOD IND with Good sitting balance EOB to engage in seated ADL tasks        Elenita Pritchett, OT

## 2022-01-17 NOTE — PLAN OF CARE
Problem: PHYSICAL THERAPY ADULT  Goal: Performs mobility at highest level of function for planned discharge setting  See evaluation for individualized goals  Description: Treatment/Interventions: Functional transfer training,LE strengthening/ROM,Elevations,Therapeutic exercise,Endurance training,Gait training,Patient/family training,Equipment eval/education,Bed mobility          See flowsheet documentation for full assessment, interventions and recommendations  Note: Prognosis: Fair  Problem List: Decreased strength,Decreased endurance,Impaired balance,Decreased mobility,Decreased coordination,Decreased cognition,Impaired judgement,Decreased safety awareness,Pain  Assessment: Pt seen for physical therapy evaluation, portion of which was performed as co-evaluation w/ OT due to concerns re: medical status  PT portion of evaluation focused on mobility assessment where OT portion focused on ADLs, self care  Pt is a 81 y/o male w/ history/comorbidities of HTN, HLD, CKD, BPH, spinal stenosis, bladder CA who is now admitted w/ weakness, abdominal pain, fall after he slid OOB w/ progressive mobility decline  Found to have elevated troponins- dx is non ischemic myocardial injury in setting of hypertensive emergency  Due to acute medical issues, pain, fall risk, note unstable clinical picture  PT consulted to assess mobility, d/c needs  Pt presents w/ decreased functional mob, standing balance, endurance, B LE strength, barriers at home  Pt will benefit from skilled PT to correct for the above problems  Recommend rehab at d/c           PT Discharge Recommendation: Post acute rehabilitation services          See flowsheet documentation for full assessment

## 2022-01-17 NOTE — ASSESSMENT & PLAN NOTE
· Patient presenting with 2 day history of weakness  · CT head negative for acute intracranial abnormality  · No evidence acute infectious/metabolic derangements   · CT CAP essentially unremarkable   · PT/OT evaluations pending   · Continue fall precautions

## 2022-01-17 NOTE — ASSESSMENT & PLAN NOTE
Lab Results   Component Value Date    EGFR 36 01/17/2022    EGFR 33 01/16/2022    EGFR 38 02/26/2021    CREATININE 1 54 (H) 01/17/2022    CREATININE 1 67 (H) 01/16/2022    CREATININE 1 50 (H) 02/26/2021     · Chronic and stable  · Baseline creatinine approximately 1 5-1 7 since 2021  · Continue to monitor BMP

## 2022-01-17 NOTE — PLAN OF CARE
Problem: OCCUPATIONAL THERAPY ADULT  Goal: Performs self-care activities at highest level of function for planned discharge setting  See evaluation for individualized goals  Description: Treatment Interventions: ADL retraining,Functional transfer training,UE strengthening/ROM,Endurance training,Cognitive reorientation,Patient/family training,Equipment evaluation/education,Fine motor coordination activities,Compensatory technique education,Energy conservation,Activityengagement          See flowsheet documentation for full assessment, interventions and recommendations  1/17/2022 1249 by Cha Lindsay OT  Note: Limitation: Decreased ADL status,Decreased UE ROM,Decreased UE strength,Decreased cognition,Decreased endurance,Decreased self-care trans,Decreased high-level ADLs,Decreased fine motor control  Prognosis: Good  Assessment: Pt is a 80 y o  male who was admitted to [facility] on 1/16/2022 with generalized weakness, Ambulatory dysfunction, abdominal pain  Pt slid out of bed onto ground, (-) headstrike, (-) LOC  Pt diagnosed with elevated troponin, non-ischemic myocardial injury, hypertensive emergency  Pt  has a past medical history of BPH with obstruction/lower urinary tract symptoms, Chronic kidney disease, GERD (gastroesophageal reflux disease), History of bladder cancer, Hypertension, and Spinal stenosis  At baseline pt was completing ADLs IND, IADLs with assist, ambulating MOD IND with rollator, (-) driving  Pt lives with his son in a 2 SH with 2nd floor bed/bathroom  Currently pt requires MOD-MAX A for overall ADLS and MOD Ax2 for functional transfers  Pt currently presents with impairments in the following categories -steps to enter environment and difficulty performing ADLS activity tolerance, endurance, standing balance/tolerance, sitting balance/tolerance, UE strength, UE ROM, FMC, memory, safety  and attention    These impairments, as well as pt's fatigue, pain, risk for falls and home environment  limit pt's ability to safely engage in all baseline areas of occupation, includingeating, grooming, bathing, dressing, toileting, functional mobility/transfers, community mobility and leisure activities  From OT standpoint, recommend [id number] upon D/C  OT will continue to follow to address the below stated goals  OT Discharge Recommendation: Post acute rehabilitation services       1/17/2022 1249 by Jewell Lovell OT  Note: Limitation: Decreased ADL status,Decreased UE ROM,Decreased UE strength,Decreased cognition,Decreased endurance,Decreased self-care trans,Decreased high-level ADLs,Decreased fine motor control  Prognosis: Good  Assessment: Pt is a 80 y o  male who was admitted to [facility] on 1/16/2022 with generalized weakness, Ambulatory dysfunction, abdominal pain  Pt slid out of bed onto ground, (-) headstrike, (-) LOC  Pt diagnosed with elevated troponin, non-ischemic myocardial injury, hypertensive emergency  Pt  has a past medical history of BPH with obstruction/lower urinary tract symptoms, Chronic kidney disease, GERD (gastroesophageal reflux disease), History of bladder cancer, Hypertension, and Spinal stenosis  At baseline pt was completing ADLs IND, IADLs with assist, ambulating MOD IND with rollator, (-) driving  Pt lives with his son in a 2 SH with 2nd floor bed/bathroom  Currently pt requires MOD-MAX A for overall ADLS and MOD Ax2 for functional transfers  Pt currently presents with impairments in the following categories -steps to enter environment and difficulty performing ADLS activity tolerance, endurance, standing balance/tolerance, sitting balance/tolerance, UE strength, UE ROM, FMC, memory, safety  and attention    These impairments, as well as pt's fatigue, pain, risk for falls and home environment  limit pt's ability to safely engage in all baseline areas of occupation, includingeating, grooming, bathing, dressing, toileting, functional mobility/transfers, community mobility and leisure activities  From OT standpoint, recommend POST-ACUTE REHAB SERVICES upon D/C  OT will continue to follow to address the below stated goals        OT Discharge Recommendation: Post acute rehabilitation services

## 2022-01-17 NOTE — PROGRESS NOTES
[facility]  Progress Note Bicknell Rash 1/28/1921, 80 y o  male MRN: [id number]  Unit/Bed#: CW2 210-01 Encounter: [id number]  Primary Care Provider: Bianka Amaro MD   Date and time admitted to hospital: 1/16/2022 12:03 PM    * Ambulatory dysfunction  Assessment & Plan  · Patient presenting with 2 day history of weakness  · CT head negative for acute intracranial abnormality  · No evidence acute infectious/metabolic derangements   · CT CAP essentially unremarkable   · PT/OT evaluations pending   · Continue fall precautions    Elevated troponin  Assessment & Plan  · Present on admission, initial troponin measured at 54  · Patient started on heparin drip in ED, which has since been discontinued   · EKG without ischemic changes   · Appreciate cardiology inputs, likely 2/2 hypertensive urgency on admission with underlying CKD   · Echocardiogram ordered and pending     Hypertensive emergency  Assessment & Plan  · POA with SBP ranging 185-217 mm Hg on initial presentation  · Likely etiology for elevated troponin  · Continue home Cozaar 25 mg  · PRN IV Hydralazine SBP >160 mm Hg  · BP improved today, monitor routinely       Generalized abdominal pain  Assessment & Plan  · Resolved  · Present prior to admission  · CT C/A/P unable to identified probable cause for abdominal pain  · Pain possibly secondary to constipation  · Continue bowel regimen with scheduled Senokot and PRN Miralax    Stage 3 chronic kidney disease [facility])  Assessment & Plan  Lab Results   Component Value Date    EGFR 36 01/17/2022    EGFR 33 01/16/2022    EGFR 38 02/26/2021    CREATININE 1 54 (H) 01/17/2022    CREATININE 1 67 (H) 01/16/2022    CREATININE 1 50 (H) 02/26/2021     · Chronic and stable  · Baseline creatinine approximately 1 5-1 7 since 2021  · Continue to monitor BMP    Gastroesophageal reflux disease without esophagitis  Assessment & Plan  · Home Prilosec non formulary  · Continue Protonix 40 mg daily      VTE Pharmacologic Prophylaxis: VTE Score: 4 Moderate Risk (Score 3-4) - Pharmacological DVT Prophylaxis Ordered: heparin  Patient Centered Rounds: I performed bedside rounds with nursing staff today  Discussions with Specialists or Other Care Team Provider: primary RN, case management     Education and Discussions with Family / Patient: Updated  (son) via phone  Time Spent for Care: 15 minutes  More than 50% of total time spent on counseling and coordination of care as described above  Current Length of Stay: 1 day(s)  Current Patient Status: Inpatient   Certification Statement: The patient will continue to require additional inpatient hospital stay due to pending echocardiogram and therapy evaluations  Discharge Plan: Anticipate discharge in 24-48 hrs to discharge location to be determined pending rehab evaluations  Code Status: Level 3 - DNAR and DNI    Subjective:   Patient offers no complaints at this time  States he injured his R shoulder when he fell at home but it is not bothering him at this time  Denies abdominal pain, lightheadedness/dizziness  Objective:     Vitals:   Temp (24hrs), Av 1 °F (36 7 °C), Min:98 1 °F (36 7 °C), Max:98 1 °F (36 7 °C)    Temp:  [98 1 °F (36 7 °C)] 98 1 °F (36 7 °C)  HR:  [] 82  Resp:  [16-39] 16  BP: (130-197)/(70-91) 136/86  SpO2:  [93 %-98 %] 95 %  Body mass index is 35 57 kg/m²  Input and Output Summary (last 24 hours): Intake/Output Summary (Last 24 hours) at 2022 1244  Last data filed at 2022 0532  Gross per 24 hour   Intake 514 17 ml   Output 300 ml   Net 214 17 ml       Physical Exam:   Physical Exam  Vitals and nursing note reviewed  Constitutional:       General: He is not in acute distress  Cardiovascular:      Rate and Rhythm: Normal rate and regular rhythm  Pulmonary:      Effort: Pulmonary effort is normal  No respiratory distress  Breath sounds: No wheezing or rales     Abdominal: General: Abdomen is flat  There is no distension  Palpations: Abdomen is soft  Tenderness: There is no abdominal tenderness  Musculoskeletal:      Right lower leg: No edema  Left lower leg: No edema  Skin:     General: Skin is warm and dry  Coloration: Skin is not pale  Findings: No erythema  Neurological:      General: No focal deficit present  Mental Status: He is alert and oriented to person, place, and time  Mental status is at baseline  Additional Data:     Labs:  Results from last 7 days   Lab Units 01/17/22  0543   WBC Thousand/uL 12 15*   HEMOGLOBIN g/dL 14 3   HEMATOCRIT % 43 8   PLATELETS Thousands/uL 192   NEUTROS PCT % 76*   LYMPHS PCT % 12*   MONOS PCT % 11   EOS PCT % 0     Results from last 7 days   Lab Units 01/17/22  0543 01/16/22  1230 01/16/22  1230   SODIUM mmol/L 133*   < > 134*   POTASSIUM mmol/L 4 2   < > 4 1   CHLORIDE mmol/L 103   < > 102   CO2 mmol/L 23   < > 24   BUN mg/dL 18   < > 15   CREATININE mg/dL 1 54*   < > 1 67*   ANION GAP mmol/L 7   < > 8   CALCIUM mg/dL 8 6   < > 9 2   ALBUMIN g/dL  --   --  3 2*   TOTAL BILIRUBIN mg/dL  --   --  1 79*   ALK PHOS U/L  --   --  156*   ALT U/L  --   --  25   AST U/L  --   --  54*   GLUCOSE RANDOM mg/dL 105   < > 113    < > = values in this interval not displayed       Results from last 7 days   Lab Units 01/16/22  1230   INR  1 11             Results from last 7 days   Lab Units 01/17/22  0543 01/16/22  1446 01/16/22  1231 01/16/22  1230   LACTIC ACID mmol/L  --  1 7  --  2 1*   PROCALCITONIN ng/ml 0 37*  --  0 56*  --        Lines/Drains:  Invasive Devices  Report    Peripheral Intravenous Line            Peripheral IV 01/16/22 Left Forearm 1 day                  Telemetry:  Telemetry Orders (From admission, onward)             48 Hour Telemetry Monitoring  Continuous x 48 hours        References:    Telemetry Guidelines   Question:  Reason for 48 Hour Telemetry  Answer:  Acute MI, chest pain - R/O MI, or unstable angina                 Telemetry Reviewed: Normal Sinus Rhythm  Indication for Continued Telemetry Use: No indication for continued use  Will discontinue  Imaging: No pertinent imaging reviewed  Recent Cultures (last 7 days):   Results from last 7 days   Lab Units 01/16/22  1239 01/16/22  1230   BLOOD CULTURE  Received in Microbiology Lab  Culture in Progress  Received in Microbiology Lab  Culture in Progress  Last 24 Hours Medication List:   Current Facility-Administered Medications   Medication Dose Route Frequency Provider Last Rate    heparin (porcine)  5,000 Units Subcutaneous [facility] Nicolasa Price MD      hydrALAZINE  5 mg Intravenous Q6H PRN Nicolasa Price MD      losartan  25 mg Oral Daily Nicloasa Price MD      melatonin  6 mg Oral HS Nicolasa Price MD      pantoprazole  40 mg Oral Early Morning Nicolasa Price MD      polyethylene glycol  17 g Oral Daily PRN Nicolasa Price MD      senna-docusate sodium  2 tablet Oral HS Nicolasa Price MD          Today, Patient Was Seen By: Vaishali Williamson PA-C    **Please Note: This note may have been constructed using a voice recognition system  **

## 2022-01-17 NOTE — CONSULTS
Consultation - Cardiology Team One  Moshe Cee 80 y o  male MRN: [id number]  Unit/Bed#: CW2 210-01 Encounter: [id number]    Inpatient consult to Cardiology  Consult performed by: ASAD Delaney  Consult ordered by: Aroldo Black MD          Physician Requesting Consult: Azalea Jean MD  Reason for Consult / Principal Problem: elevated troponin    Assessment:    Non-ischemic myocardial injury  · Patient with hx of ambulatory dysfunction; to the ED with c/o generalized abdominal pain and weakness x 2 days as well as fall prior to admit (slid out of bed due to weakness)  · EKG on admit is sinus rhythm with PAC's rate of 106; no acute ST changes noted  · No c/o CP  · HsTn 0hr 54; 2 hr 53 (delta -1); 4 hr 56 (delta 2); he had been placed on an IV heparin drip per ACS protocol but his has since been d/c'd and I agree there is no need for ACS heparin infusion  · Likely non ischemic myocardial injury in the setting of accelerate HTN on admit in a patient with CKD  · No prior TTE on file for patient; TTE ordered by primary team, await results    Hypertensive emergency  · BP elevated on admit with /103; elevated troponin noted on admit  · SBP averaging now averaging 130's  · Home meds: losartan 25 mg QD; continue home medications    CKD stage 3  · Baseline creat 1 5-1 7  · Creat on admit    Plan/Recommendations:  · Await TTE results  · Continue home meds      _________________________________________________________________________        History of Present Illness   HPI: Moshe Cee is a 8 y o  year old male with PMH of HTN, HLD, CKD stage 3 and ambulatory dysfunction comes to the ED with c/o abdominal pain and generalized weakness x 2 days  Patient comes to the ED secondary to complaints of generalized weakness x2 days at home as well as a fall this morning having slid out of bed secondary to weakness    The patient also notes abdominal pain that he states has been chronic for the past month  Patient denies any past cardiac history stating he has never had an MI/CABG or arrhythmias  In the ED EKG appears to be sinus with frequent PACs  The patient is noted to have accelerated hypertension on admission with systolic blood pressure greater than the 200s  High sensitivity troponins noted to be mildly elevated and patient was initially started on IV heparin drip per ACS protocol  Cardiology is consulted secondary to elevated troponins  At time of exam interview the patient is resting comfortably in bed with no acute complaints  Patient denies any chest discomfort or anginal equivalent  IV heparin drip has since been discontinued prior to consultation and I agree with discontinuing this  Troponins are minimally elevated and flat, likely representing nonischemic myocardial injury in the setting of accelerated hypertension in a patient with CKD  EKG reviewed personally: EKG on admit is sinus rhythm with PAC's rate of 106        Telemetry reviewed personally: sinus rhythm with frequent PAC's rates in the 70's        Review of Systems   Constitutional: Positive for malaise/fatigue  Negative for chills and fever  HENT: Negative for congestion  Cardiovascular: Negative for chest pain, dyspnea on exertion, leg swelling, orthopnea and palpitations  Respiratory: Negative for cough, shortness of breath (no SOB at rest) and wheezing  Endocrine: Negative  Hematologic/Lymphatic: Negative  Skin: Negative  Musculoskeletal: Negative  Gastrointestinal: Positive for abdominal pain (chronic per patient)  Negative for bloating, nausea and vomiting  Genitourinary: Negative  Neurological: Positive for weakness  Negative for dizziness and light-headedness  Psychiatric/Behavioral: Negative  All other systems reviewed and are negative      Historical Information   Past Medical History:   Diagnosis Date    BPH with obstruction/lower urinary tract symptoms     Chronic kidney disease     GERD (gastroesophageal reflux disease)     History of bladder cancer     Hypertension     Spinal stenosis      Past Surgical History:   Procedure Laterality Date    ANKLE ARTHROSCOPY      BLEPHAROPLASTY      GALLBLADDER SURGERY      KNEE ARTHROSCOPY       Social History     Substance and Sexual Activity   Alcohol Use Never     Social History     Substance and Sexual Activity   Drug Use Never     Social History     Tobacco Use   Smoking Status Former Smoker   Smokeless Tobacco Never Used     Family History:   Family History   Problem Relation Age of Onset    Cancer Brother        Meds/Allergies   current meds:   Current Facility-Administered Medications   Medication Dose Route Frequency    heparin (porcine) subcutaneous injection 5,000 Units  5,000 Units Subcutaneous Q8H [address]    hydrALAZINE (APRESOLINE) injection 5 mg  5 mg Intravenous Q6H PRN    losartan (COZAAR) tablet 25 mg  25 mg Oral Daily    melatonin tablet 6 mg  6 mg Oral HS    pantoprazole (PROTONIX) EC tablet 40 mg  40 mg Oral Early Morning    polyethylene glycol (MIRALAX) packet 17 g  17 g Oral Daily PRN    senna-docusate sodium (SENOKOT S) 8 6-50 mg per tablet 2 tablet  2 tablet Oral HS          No Known Allergies    Objective   Vitals: Blood pressure 136/86, pulse 82, temperature 98 1 °F (36 7 °C), resp  rate 16, weight 94 kg (207 lb 3 7 oz), SpO2 95 %  ,     Body mass index is 35 57 kg/m²  ,     Systolic (22SZJ), LYI:745 , Min:130 , TAYLER:920     Diastolic (94ZDK), HBO:47, Min:70, Max:103    Wt Readings from Last 3 Encounters:   01/17/22 94 kg (207 lb 3 7 oz)   03/18/21 93 9 kg (207 lb)   03/15/21 93 9 kg (207 lb)      Lab Results   Component Value Date    CREATININE 1 54 (H) 01/17/2022    CREATININE 1 67 (H) 01/16/2022    CREATININE 1 50 (H) 02/26/2021             Intake/Output Summary (Last 24 hours) at 1/17/2022 0907  Last data filed at 1/17/2022 0532  Gross per 24 hour   Intake 514 17 ml   Output 300 ml   Net 214 17 ml Weight (last 2 days)     Date/Time Weight    01/17/22 0901 94 (207 23)    01/16/22 1207 93 9 (207)        Invasive Devices  Report    Peripheral Intravenous Line            Peripheral IV 01/16/22 Left Forearm <1 day                  Physical Exam  Vitals reviewed  Constitutional:       General: He is not in acute distress  Appearance: He is obese  HENT:      Head: Normocephalic and atraumatic  Mouth/Throat:      Mouth: Mucous membranes are moist    Cardiovascular:      Rate and Rhythm: Normal rate and regular rhythm  Occasional extrasystoles are present  Heart sounds: Normal heart sounds, S1 normal and S2 normal  No murmur heard  Pulmonary:      Effort: Pulmonary effort is normal  No respiratory distress  Breath sounds: Normal breath sounds  Abdominal:      General: Bowel sounds are normal  There is no distension  Palpations: Abdomen is soft  Musculoskeletal:         General: Normal range of motion  Cervical back: Normal range of motion and neck supple  Right lower leg: No edema  Left lower leg: No edema  Skin:     General: Skin is warm and dry  Neurological:      Mental Status: He is alert and oriented to person, place, and time     Psychiatric:         Mood and Affect: Mood normal            LABORATORY RESULTS:      CBC with diff:   Results from last 7 days   Lab Units 01/17/22  0543 01/16/22  1230   WBC Thousand/uL 12 15* 14 50*   HEMOGLOBIN g/dL 14 3 15 2   HEMATOCRIT % 43 8 46 5   MCV fL 93 93   PLATELETS Thousands/uL 192 213   MCH pg 30 2 30 4   MCHC g/dL 32 6 32 7   RDW % 13 2 13 3   MPV fL 10 6 10 3   NRBC AUTO /100 WBCs 0 0       CMP:  Results from last 7 days   Lab Units 01/17/22  0543 01/16/22  1230   POTASSIUM mmol/L 4 2 4 1   CHLORIDE mmol/L 103 102   CO2 mmol/L 23 24   BUN mg/dL 18 15   CREATININE mg/dL 1 54* 1 67*   CALCIUM mg/dL 8 6 9 2   AST U/L  --  54*   ALT U/L  --  25   ALK PHOS U/L  --  156*   EGFR ml/min/1 73sq m 36 33 BMP:  Results from last 7 days   Lab Units 01/17/22  0543 01/16/22  1230   POTASSIUM mmol/L 4 2 4 1   CHLORIDE mmol/L 103 102   CO2 mmol/L 23 24   BUN mg/dL 18 15   CREATININE mg/dL 1 54* 1 67*   CALCIUM mg/dL 8 6 9 2          No results found for: NTBNP         Results from last 7 days   Lab Units 01/17/22  0543   MAGNESIUM mg/dL 2 0                     Results from last 7 days   Lab Units 01/16/22  1230   INR  1 11     Lipid Profile:   No results found for: CHOL  No results found for: HDL  No results found for: LDLCALC  No results found for: TRIG      Cardiac testing:   No results found for this or any previous visit  No results found for this or any previous visit  No valid procedures specified  No results found for this or any previous visit  Imaging: I have personally reviewed pertinent reports  CT chest abdomen pelvis wo contrast    Result Date: 1/16/2022  Narrative: CT CHEST, ABDOMEN AND PELVIS WITHOUT IV CONTRAST INDICATION:   fall, left chest and abdominal pain, GFR 33  COMPARISON:  None  TECHNIQUE: CT examination of the chest, abdomen and pelvis was performed without intravenous contrast   Axial, sagittal, and coronal 2D reformatted images were created from the source data and submitted for interpretation  Radiation dose length product (DLP) for this visit:  1467 96 mGy-cm   This examination, like all CT scans performed in the [facility], was performed utilizing techniques to minimize radiation dose exposure, including the use of iterative reconstruction and automated exposure control  Enteric contrast was not administered  FINDINGS: Technically somewhat limited due to artifact from patient's arms which could not be elevated as well as due to patient motion  CHEST LUNGS:  Some areas of subsegmental atelectasis identified in the left upper lobe and both lung bases  Central airways are patent  PLEURA:  Unremarkable   HEART/GREAT VESSELS: The heart is top normal in size   Unenhanced great vessels are normal in course and caliber  No thoracic aortic aneurysm  MEDIASTINUM AND ALHAJI:  Unremarkable  CHEST WALL AND LOWER NECK:   Unremarkable  ABDOMEN LIVER/BILIARY TREE:  Unremarkable  GALLBLADDER:  Gallbladder is surgically absent  SPLEEN:  Unremarkable  PANCREAS:  Diffusely atrophic without focal abnormality  ADRENAL GLANDS:  Unremarkable  KIDNEYS/URETERS:  Unremarkable  No hydronephrosis  STOMACH AND BOWEL:  No bowel wall thickening  No intestinal obstruction  Scattered diverticula without diverticulitis  APPENDIX:  No findings to suggest appendicitis  ABDOMINOPELVIC CAVITY:  No ascites  No pneumoperitoneum  No lymphadenopathy  VESSELS:  Atherosclerotic changes are present  No evidence of aneurysm  PELVIS REPRODUCTIVE ORGANS:  Mild prostatomegaly  URINARY BLADDER:  Unremarkable  ABDOMINAL WALL/INGUINAL REGIONS:  Unremarkable  OSSEOUS STRUCTURES:  Straightening of the lumbar lordosis  Degenerative change throughout the spine  No acute fracture  No lucent or sclerotic osseous lesion  Impression: 1  No acute posttraumatic abnormality identified in the chest, abdomen, or pelvis  2   Nonemergent features is noted  Workstation performed: [id number]     XR chest portable    Result Date: 1/17/2022  Narrative: CHEST INDICATION:   tachypnea  Acute MI COMPARISON:  None EXAM PERFORMED/VIEWS:  XR CHEST PORTABLE 1 image FINDINGS: Cardiomediastinal silhouette appears enlarged  Minimal bibasilar atelectasis  No pneumothorax or pleural effusion  Surgical anchor in the left humeral head  Evidence of rotator cuff arthropathy on the left  Foreshortening of the distal left clavicle  Impression: Minimal bibasilar atelectasis  Workstation performed: [id number]     CT head without contrast    Result Date: 1/16/2022  Narrative: CT BRAIN - WITHOUT CONTRAST INDICATION:   fall  COMPARISON:  CT 2/23/2021 TECHNIQUE:  CT examination of the brain was performed    In addition to axial images, sagittal and coronal 2D reformatted images were created and submitted for interpretation  Radiation dose length product (DLP) for this visit:  939 16 mGy-cm   This examination, like all CT scans performed in the [facility], was performed utilizing techniques to minimize radiation dose exposure, including the use of iterative  reconstruction and automated exposure control  IMAGE QUALITY:  Diagnostic  FINDINGS: PARENCHYMA: Decreased attenuation is noted in periventricular and subcortical white matter demonstrating an appearance that is statistically most likely to represent moderate microangiopathic change  No CT signs of acute infarction  No intracranial mass, mass effect or midline shift  No acute parenchymal hemorrhage  VENTRICLES AND EXTRA-AXIAL SPACES:  Normal for the patient's age  VISUALIZED ORBITS AND PARANASAL SINUSES:  Unremarkable  CALVARIUM AND EXTRACRANIAL SOFT TISSUES:  Normal      Impression: No acute intracranial abnormality  Microangiopathic changes  Workstation performed: [id number]           Counseling / Coordination of Care  Total floor / unit time spent today 45 minutes  Greater than 50% of total time was spent with the patient and / or family counseling and / or coordination of care  A description of the counseling / coordination of care: Review of history, current assessment, development of a plan  Code Status: Level 3 - DNAR and DNI    ** Please Note: Dragon 360 Dictation voice to text software may have been used in the creation of this document   **

## 2022-01-17 NOTE — ASSESSMENT & PLAN NOTE
· POA with SBP ranging 185-217 mm Hg on initial presentation  · Likely etiology for elevated troponin  · Continue home Cozaar 25 mg  · PRN IV Hydralazine SBP >160 mm Hg  · BP improved today, monitor routinely

## 2022-01-17 NOTE — ED NOTES
Pt alert but confused on year  VS obtained  Pt repositioned for comfort          Silvana Griggs RN  01/16/22 5654

## 2022-01-18 PROBLEM — R13.10 DYSPHAGIA: Status: ACTIVE | Noted: 2022-01-18

## 2022-01-18 LAB
ATRIAL RATE: 68 BPM
FLUAV RNA RESP QL NAA+PROBE: NEGATIVE
FLUBV RNA RESP QL NAA+PROBE: NEGATIVE
QRS AXIS: 71 DEGREES
QRSD INTERVAL: 128 MS
QT INTERVAL: 352 MS
QTC INTERVAL: 467 MS
RSV RNA RESP QL NAA+PROBE: NEGATIVE
SARS-COV-2 RNA RESP QL NAA+PROBE: NEGATIVE
T WAVE AXIS: -43 DEGREES
VENTRICULAR RATE: 106 BPM

## 2022-01-18 PROCEDURE — 0241U HB NFCT DS VIR RESP RNA 4 TRGT: CPT | Performed by: PHYSICIAN ASSISTANT

## 2022-01-18 PROCEDURE — 97530 THERAPEUTIC ACTIVITIES: CPT

## 2022-01-18 PROCEDURE — 97110 THERAPEUTIC EXERCISES: CPT

## 2022-01-18 PROCEDURE — 93010 ELECTROCARDIOGRAM REPORT: CPT | Performed by: INTERNAL MEDICINE

## 2022-01-18 PROCEDURE — 99232 SBSQ HOSP IP/OBS MODERATE 35: CPT | Performed by: PHYSICIAN ASSISTANT

## 2022-01-18 RX ORDER — TRAMADOL HYDROCHLORIDE 50 MG/1
50 TABLET ORAL EVERY 8 HOURS PRN
Status: DISCONTINUED | OUTPATIENT
Start: 2022-01-18 | End: 2022-01-19 | Stop reason: HOSPADM

## 2022-01-18 RX ORDER — DOCUSATE SODIUM 100 MG/1
100 CAPSULE, LIQUID FILLED ORAL 2 TIMES DAILY
Status: DISCONTINUED | OUTPATIENT
Start: 2022-01-18 | End: 2022-01-19 | Stop reason: HOSPADM

## 2022-01-18 RX ORDER — QUETIAPINE FUMARATE 25 MG/1
12.5 TABLET, FILM COATED ORAL
Status: DISCONTINUED | OUTPATIENT
Start: 2022-01-18 | End: 2022-01-19 | Stop reason: HOSPADM

## 2022-01-18 RX ORDER — ACETAMINOPHEN 325 MG/1
650 TABLET ORAL EVERY 6 HOURS PRN
Status: DISCONTINUED | OUTPATIENT
Start: 2022-01-18 | End: 2022-01-19 | Stop reason: HOSPADM

## 2022-01-18 RX ADMIN — DOCUSATE SODIUM 100 MG: 100 CAPSULE ORAL at 09:08

## 2022-01-18 RX ADMIN — LOSARTAN POTASSIUM 25 MG: 25 TABLET, FILM COATED ORAL at 08:10

## 2022-01-18 RX ADMIN — HEPARIN SODIUM 5000 UNITS: 5000 INJECTION INTRAVENOUS; SUBCUTANEOUS at 15:22

## 2022-01-18 RX ADMIN — HEPARIN SODIUM 5000 UNITS: 5000 INJECTION INTRAVENOUS; SUBCUTANEOUS at 06:00

## 2022-01-18 RX ADMIN — QUETIAPINE FUMARATE 12.5 MG: 25 TABLET ORAL at 22:18

## 2022-01-18 RX ADMIN — Medication 6 MG: at 22:18

## 2022-01-18 RX ADMIN — PANTOPRAZOLE SODIUM 40 MG: 40 TABLET, DELAYED RELEASE ORAL at 06:00

## 2022-01-18 RX ADMIN — POLYETHYLENE GLYCOL 3350 17 G: 17 POWDER, FOR SOLUTION ORAL at 08:52

## 2022-01-18 RX ADMIN — HEPARIN SODIUM 5000 UNITS: 5000 INJECTION INTRAVENOUS; SUBCUTANEOUS at 22:18

## 2022-01-18 NOTE — NUTRITION
01/18/22 1132   Recommendations/Interventions   Summary Patient reported difficulity with breafast this am (eggs and shannon)  Notified speech therpaist, she recommended to downgrade diet to Level 2 Dysphagia, thin liquids at this time     Interventions Diet: order adjustment  (Per speech recommendations, diet downgraded to Level 2 dysphagia, thin liquids )   Nutrition Recommendations No new recommendations

## 2022-01-18 NOTE — ASSESSMENT & PLAN NOTE
· POA with SBP ranging 185-217 mm Hg on initial presentation along with elevated troponin   · Continue home Cozaar 25 mg  · PRN IV Hydralazine SBP >160 mm Hg  · Still with intermittent urgency however overall improved, monitor routinely

## 2022-01-18 NOTE — ASSESSMENT & PLAN NOTE
Lab Results   Component Value Date    EGFR 36 01/17/2022    EGFR 33 01/16/2022    EGFR 38 02/26/2021    CREATININE 1 54 (H) 01/17/2022    CREATININE 1 67 (H) 01/16/2022    CREATININE 1 50 (H) 02/26/2021     · Chronic and stable  · Baseline creatinine approximately 1 5-1 7 since 2021  · Monitor as needed

## 2022-01-18 NOTE — ASSESSMENT & PLAN NOTE
· Present on admission, initial troponin measured at 54  · Patient started on heparin drip in ED, which has since been discontinued   · EKG without ischemic changes   · Appreciate cardiology inputs, likely 2/2 hypertensive urgency on admission with underlying CKD   · Echocardiogram: LVEF 14%, normal systolic function, no RWMA, grade 1 diastolic dysfunction

## 2022-01-18 NOTE — ASSESSMENT & PLAN NOTE
· Patient presenting with 2 day history of weakness and falls at home   · CT head negative for acute intracranial abnormality  · No evidence acute infectious/metabolic derangements   · CT CAP essentially unremarkable   · PT/OT recommending rehab, CM following  · Continue fall precautions

## 2022-01-18 NOTE — CASE MANAGEMENT
Case Management Discharge Planning Note    Patient name Karen Loyalton  Location 2 210/CW2 534-95 MRN [id number]  : 1921 Date 2022       Current Admission Date: 2022  Current Admission Diagnosis:Ambulatory dysfunction   Patient Active Problem List    Diagnosis Date Noted    Dysphagia 2022    Elevated troponin 2022    Generalized abdominal pain 2022    Stage 3 chronic kidney disease ([facility] [address] ) 2022    Dyspnea on exertion 2021    Generalized weakness 2021    Goals of care, counseling/discussion 2021    Constipation 2021    Persistent dry cough 2021    Insomnia 2021    Ambulatory dysfunction 2021    Multiple falls 2021    Hypertensive emergency 2021    Chronic midline thoracic back pain 2021    Gastroesophageal reflux disease without esophagitis 2021    History of bladder cancer 2021    Acute kidney injury superimposed on chronic kidney disease ([facility] [address] ) 2021      LOS (days): 2  Geometric Mean LOS (GMLOS) (days): 2 60  Days to GMLOS:0 6     OBJECTIVE:  Risk of Unplanned Readmission Score: 16      Current admission status: Inpatient   [id number] [address]  [address] [facility] [address]  [facility] [address]  Phone: [phone] Fax: [phone]    Primary Care Provider: Eyal Okeefe MD    Primary Insurance: MEDICARE  Secondary Insurance: [address] [facility]    DISCHARGE DETAILS:    Discharge planning discussed with[de-identified] Deny Maldonado via phone  Freedom of Choice: Yes     Treatment Team Recommendation: Short Term Rehab  Discharge Destination Plan[de-identified] Short Term Rehab      Additional Comments: Deny Iraheta would like to accept bed at [facility]  Unfortunately, [facility] is requesting a financial application and family would like to pursue [facility] due to ease of transfer and earliest availability   CM requested a 10am transport time TOMORROW via BLS  CM will continue to follow for p/u time      Accepting Facility Name, [facility] [address]  Receiving Facility/Agency Phone Number: [phone]  Facility/Agency Fax Number: [phone]

## 2022-01-18 NOTE — CASE MANAGEMENT
Case Management Discharge Planning Note    Patient name Florentin Shell  Location 2 210/2 870-06 MRN [id number]  : 1921 Date 2022       Current Admission Date: 2022  Current Admission Diagnosis:Ambulatory dysfunction   Patient Active Problem List    Diagnosis Date Noted    Dysphagia 2022    Elevated troponin 2022    Generalized abdominal pain 2022    Stage 3 chronic kidney disease ([facility] [address] ) 2022    Dyspnea on exertion 2021    Generalized weakness 2021    Goals of care, counseling/discussion 2021    Constipation 2021    Persistent dry cough 2021    Insomnia 2021    Ambulatory dysfunction 2021    Multiple falls 2021    Hypertensive emergency 2021    Chronic midline thoracic back pain 2021    Gastroesophageal reflux disease without esophagitis 2021    History of bladder cancer 2021    Acute kidney injury superimposed on chronic kidney disease ([facility] [address] ) 2021      LOS (days): 2  Geometric Mean LOS (GMLOS) (days): 2 60  Days to GMLOS:0 7     OBJECTIVE:  Risk of Unplanned Readmission Score: 15         Current admission status: Inpatient   Preferred Pharmacy:   [place] [address] #[id number] [facility] - [address]  [address]  [facility] [address]  Phone: [phone] Fax: [phone]    Primary Care Provider: Miquel Denver, MD    Primary Insurance: MEDICARE  Secondary Insurance: [address] [place] [address]    DISCHARGE DETAILS:    Discharge planning discussed with[de-identified] Patient at bedside; Deny Sampson via phone (per pt's request)  Freedom of Choice: Yes  Comments - Freedom of Choice: List of STR's given to pt at bedside    CM contacted family/caregiver?: Yes (Pt requested that this CM s/w deny Sampson via phone to discuss rehab recommendation )     Other Referral/Resources/Interventions Provided:  Interventions: Short Term Rehab  Referral Comments: Broad referrals placed w/in a 20 mile radius; Pt in agreement  CM received responses from the following facilities willing to accept: [facility], [facility],  [facility]  CM s/w pt at bedside to review same, however he requested that I contact his son to discuss instead  CM s/w son via phone to review above -- He is in agreement w/ STR recommendation, and is leaning towards accepting the bed at [facility], but would like to follow-up with a friend that has had a family member there in the past  Son requests that this CM decline the bed at the [facility] locations -- Responses sent via [address]  CM will await final decision regarding [facility] vs  [facility]

## 2022-01-18 NOTE — PHYSICAL THERAPY NOTE
Physical Therapy Treatment Note    Patient's Name: Rodrigo Cortes  : 22 1134   PT Last Visit   PT Visit Date 22   Note Type   Note Type Treatment   Pain Assessment   Pain Assessment Tool 0-10   Pain Score No Pain   Restrictions/Precautions   Weight Bearing Precautions Per Order No   Other Precautions Cognitive; Chair Alarm; Bed Alarm; Fall Risk   General   Chart Reviewed Yes   Family/Caregiver Present No   Cognition   Overall Cognitive Status Impaired   Arousal/Participation Alert; Cooperative   Attention Attends with cues to redirect   Orientation Level Oriented X4   Memory Decreased recall of precautions;Decreased recall of recent events   Following Commands Follows one step commands with increased time or repetition   Comments Pt oriented, but confused requiring frequent redirection   Bed Mobility   Supine to Sit 3  Moderate assistance   Additional items Assist x 2;HOB elevated;LE management;Verbal cues; Increased time required   Additional Comments Pt required fluctuating levels of assist from close supervision to [address] for trunk contol at EOB (retropulsive)   Transfers   Sit to Stand 3  Moderate assistance   Additional items Assist x 1; Impulsive;Verbal cues   Stand to Sit 3  Moderate assistance   Additional items Assist x 1; Impulsive;Verbal cues   Stand pivot 3  Moderate assistance   Additional items Assist x 2;Impulsive;Verbal cues   Additional Comments Performed multiple STS transfers with RW  VCs for proper hand placement with poor carryover- pt impusively pulling up on RW    Ambulation/Elevation   Gait pattern Improper Weight shift;Retropulsion; Excessively slow; Short stride;Decreased foot clearance   Gait Assistance 3  Moderate assist   Additional items Assist x 2;Verbal cues   Assistive Device Rolling walker   Distance 3ft with VCs for keeping both hands on RW      Balance   Static Sitting Poor +   Dynamic Sitting Poor   Static Standing Poor -   Dynamic Standing Poor - Ambulatory Poor -   Activity Tolerance   Activity Tolerance Patient limited by fatigue   Nurse Made Aware ok to see per RN   Exercises   Knee AROM Long Arc Quad Sitting;10 reps;AROM; Bilateral   Assessment   Prognosis Fair   Problem List Decreased strength;Decreased endurance; Impaired balance;Decreased mobility; Decreased cognition;Decreased safety awareness   Assessment Pt seen for PT session today with a focus on functional transfers, sitting balance, gait and LE therex  Pt has made slow, but steady progress towards mobility goals since initial evaluation requiring less overall assistance for mobility this session  Pt was able to perform supine > sit transfer with modAx2  Once seated EOB, pt required varying levels of assist from close supervision-modA with occasional retropulsion  Performed multiple STS transfers this session with Ax1, however pt was impulsive and demonstrated poor carryover of proper hand placement on RW  Pt was able to ambulate a few short steps from bed to chair today, but remains limited 2* BLE weakness and balance deficits  Pt would benefit from continued acute skilled PT to address above deficits  Continuing to recommend rehab upon d/c  Goals   Patient Goals to sit in the chair   STG Expiration Date 01/31/22   PT Treatment Day 1   Plan   Treatment/Interventions Functional transfer training;LE strengthening/ROM; Therapeutic exercise; Endurance training;Equipment eval/education; Bed mobility;Gait training;Spoke to nursing   Progress Progressing toward goals   PT Frequency 3-5x/wk   Recommendation   PT Discharge Recommendation Post acute rehabilitation services   AM-PAC Basic Mobility Inpatient   Turning in Bed Without Bedrails 2   Lying on Back to Sitting on Edge of Flat Bed 1   Moving Bed to Chair 1   Standing Up From Chair 2   Walk in Room 1   Climb 3-5 Stairs 1   Basic Mobility Inpatient Raw Score 8   Turning Head Towards Sound 4   Follow Simple Instructions 3   Low Function Basic Mobility Raw Score 15   Low Function Basic Mobility Standardized Score 23 9   Highest Level Of Mobility   -[facility] Goal 3: Sit at edge of bed   -[facility] Highest Level of Mobility 4: Move to chair/commode   -[facility] Goal Achieved Yes   Education   Education Provided Mobility training;Assistive device   Patient Reinforcement needed   End of Consult   Patient Position at End of Consult Bedside chair;Bed/Chair alarm activated; All needs within reach       Genevieve Art, PT, DPT

## 2022-01-18 NOTE — PROGRESS NOTES
[facility]  Progress Note - Gretchen Oviedo 1921, Robert  [age] y o  male MRN: [id number]  Unit/Bed#: 2 210-01 Encounter: [id number]  Primary Care Provider: Huong Cruz MD   Date and time admitted to hospital: 2022 12:03 PM    * Ambulatory dysfunction  Assessment & Plan  · Patient presenting with 2 day history of weakness and falls at home   · CT head negative for acute intracranial abnormality  · No evidence acute infectious/metabolic derangements   · CT CAP essentially unremarkable   · PT/OT recommending rehab, CM following  · Continue fall precautions    Elevated troponin  Assessment & Plan  · Present on admission, initial troponin measured at 54  · Patient started on heparin drip in ED, which has since been discontinued   · EKG without ischemic changes   · Appreciate cardiology inputs, likely 2/2 hypertensive urgency on admission with underlying CKD   · Echocardiogram: LVEF 44%, normal systolic function, no RWMA, grade 1 diastolic dysfunction     Hypertensive emergency  Assessment & Plan  · POA with SBP ranging 185-217 mm Hg on initial presentation along with elevated troponin   · Continue home Cozaar 25 mg  · PRN IV Hydralazine SBP >160 mm Hg  · Still with intermittent urgency however overall improved, monitor routinely       Generalized abdominal pain  Assessment & Plan  · Resolved  · Present prior to admission  · CT C/A/P unable to identified probable cause for abdominal pain  · Pain possibly secondary to constipation  · Continue bowel regimen with scheduled Senokot and PRN Miralax    Stage 3 chronic kidney disease [facility])  Assessment & Plan  Lab Results   Component Value Date    EGFR 36 2022    EGFR 33 2022    EGFR 38 2021    CREATININE 1 54 (H) 2022    CREATININE 1 67 (H) 2022    CREATININE 1 50 (H) 2021     · Chronic and stable  · Baseline creatinine approximately 1 5-1 7 since   · Monitor as needed    Gastroesophageal reflux disease without esophagitis  Assessment & Plan  · Home Prilosec non formulary  · Continue Protonix 40 mg daily    Dysphagia  Assessment & Plan  · Appreciate speech therapy recommendations   · Dental soft diet with thin liquids   · Aspiration precautions     Chronic midline thoracic back pain  Assessment & Plan  · Continue home Tramadol 50 mg q8h prn   · PT/OT    VTE Pharmacologic Prophylaxis: VTE Score: 4 Moderate Risk (Score 3-4) - Pharmacological DVT Prophylaxis Ordered: heparin  Patient Centered Rounds: I performed bedside rounds with nursing staff today  Discussions with Specialists or Other Care Team Provider: primary RN, case management     Education and Discussions with Family / Patient: will call patient's son or daughter with update later today  Time Spent for Care: 15 minutes  More than 50% of total time spent on counseling and coordination of care as described above  Current Length of Stay: 2 day(s)  Current Patient Status: Inpatient   Certification Statement: The patient will continue to require additional inpatient hospital stay due to rehab placement   Discharge Plan: pending rehab placement     Code Status: Level 3 - DNAR and DNI    Subjective:   Patient slightly irritated this morning, stating that his bed doesn't work, he is uncomfortable and he wants to get OOB into the chair  He endorses chronic low back pain and also reports trouble swallowing, stating that he feels like when he eats it "goes down the wrong pipe"  He notes some dry cough as well but denies SOB  States he thinks he had a bowel movement yesterday  Denies abdominal pain nausea or vomiting  Agreeable to rehab  Objective:     Vitals:   Temp (24hrs), Av 1 °F (36 7 °C), Min:97 9 °F (36 6 °C), Max:98 1 °F (36 7 °C)    Temp:  [97 9 °F (36 6 °C)-98 1 °F (36 7 °C)] 97 9 °F (36 6 °C)  HR:  [] 95  Resp:  [16-20] 18  BP: (120-184)/() 147/84  SpO2:  [95 %-96 %] 96 %  Body mass index is 35 53 kg/m²       Input and Output Summary (last 24 hours): Intake/Output Summary (Last 24 hours) at 1/18/2022 0844  Last data filed at 1/17/2022 2313  Gross per 24 hour   Intake 200 ml   Output 900 ml   Net -700 ml       Physical Exam:   Physical Exam  Vitals and nursing note reviewed  Constitutional:       General: He is not in acute distress  Cardiovascular:      Rate and Rhythm: Normal rate and regular rhythm  Pulmonary:      Effort: Pulmonary effort is normal  No respiratory distress  Breath sounds: No wheezing or rales  Abdominal:      General: Abdomen is flat  There is no distension  Palpations: Abdomen is soft  Tenderness: There is no abdominal tenderness  Musculoskeletal:      Right lower leg: No edema  Left lower leg: No edema  Skin:     General: Skin is warm and dry  Findings: Abrasion (BLE) and bruising (BUE) present  Neurological:      General: No focal deficit present  Mental Status: He is alert and oriented to person, place, and time  Mental status is at baseline  Additional Data:     Labs:  Results from last 7 days   Lab Units 01/17/22  0543   WBC Thousand/uL 12 15*   HEMOGLOBIN g/dL 14 3   HEMATOCRIT % 43 8   PLATELETS Thousands/uL 192   NEUTROS PCT % 76*   LYMPHS PCT % 12*   MONOS PCT % 11   EOS PCT % 0     Results from last 7 days   Lab Units 01/17/22  0543 01/16/22  1230 01/16/22  1230   SODIUM mmol/L 133*   < > 134*   POTASSIUM mmol/L 4 2   < > 4 1   CHLORIDE mmol/L 103   < > 102   CO2 mmol/L 23   < > 24   BUN mg/dL 18   < > 15   CREATININE mg/dL 1 54*   < > 1 67*   ANION GAP mmol/L 7   < > 8   CALCIUM mg/dL 8 6   < > 9 2   ALBUMIN g/dL  --   --  3 2*   TOTAL BILIRUBIN mg/dL  --   --  1 79*   ALK PHOS U/L  --   --  156*   ALT U/L  --   --  25   AST U/L  --   --  54*   GLUCOSE RANDOM mg/dL 105   < > 113    < > = values in this interval not displayed       Results from last 7 days   Lab Units 01/16/22  1230   INR  1 11             Results from last 7 days   Lab Units 01/17/22  0543 01/16/22  1446 01/16/22  1231 01/16/22  1230   LACTIC ACID mmol/L  --  1 7  --  2 1*   PROCALCITONIN ng/ml 0 37*  --  0 56*  --        Lines/Drains:  Invasive Devices  Report    Peripheral Intravenous Line            Peripheral IV 01/16/22 Left Forearm 1 day                      Imaging: Reviewed radiology reports from this admission including: ECHO    Recent Cultures (last 7 days):   Results from last 7 days   Lab Units 01/16/22  1239 01/16/22  1230   BLOOD CULTURE  No Growth at 24 hrs  No Growth at 24 hrs  Last 24 Hours Medication List:   Current Facility-Administered Medications   Medication Dose Route Frequency Provider Last Rate    acetaminophen  650 mg Oral Q6H PRN Araseli E NANCY Harris      docusate sodium  100 mg Oral BID Araseli Harris PA-C      heparin (porcine)  5,000 Units Subcutaneous [facility] Nkechi Magaña MD      hydrALAZINE  5 mg Intravenous Q6H PRN Nkechi Magaña MD      losartan  25 mg Oral Daily Nkechi Magaña MD      melatonin  6 mg Oral HS Nkechi Magaña MD      pantoprazole  40 mg Oral Early Morning Nkechi Magaña MD      polyethylene glycol  17 g Oral Daily PRN Nkechi Magaña MD      QUEtiapine  12 5 mg Oral HS Luis Frenhc PA-C      senna-docusate sodium  2 tablet Oral HS Nkechi Magaña MD      traMADol  50 mg Oral Q8H PRN Teresa Harris PA-C          Today, Patient Was Seen By: Ladonna Rodriguez PA-C    **Please Note: This note may have been constructed using a voice recognition system  **

## 2022-01-18 NOTE — ASSESSMENT & PLAN NOTE
· Appreciate speech therapy recommendations   · Dental soft diet with thin liquids   · Aspiration precautions

## 2022-01-18 NOTE — PLAN OF CARE
Problem: PHYSICAL THERAPY ADULT  Goal: Performs mobility at highest level of function for planned discharge setting  See evaluation for individualized goals  Description: Treatment/Interventions: Functional transfer training,LE strengthening/ROM,Elevations,Therapeutic exercise,Endurance training,Gait training,Patient/family training,Equipment eval/education,Bed mobility          See flowsheet documentation for full assessment, interventions and recommendations  Outcome: Progressing  Note: Prognosis: Fair  Problem List: Decreased strength,Decreased endurance,Impaired balance,Decreased mobility,Decreased cognition,Decreased safety awareness  Assessment: Pt seen for PT session today with a focus on functional transfers, sitting balance, gait and LE therex  Pt has made slow, but steady progress towards mobility goals since initial evaluation requiring less overall assistance for mobility this session  Pt was able to perform supine > sit transfer with modAx2  Once seated EOB, pt required varying levels of assist from close supervision-modA with occasional retropulsion  Performed multiple STS transfers this session with Ax1, however pt was impulsive and demonstrated poor carryover of proper hand placement on RW  Pt was able to ambulate a few short steps from bed to chair today, but remains limited 2* BLE weakness and balance deficits  Pt would benefit from continued acute skilled PT to address above deficits  Continuing to recommend rehab upon d/c             PT Discharge Recommendation: Post acute rehabilitation services          See flowsheet documentation for full assessment

## 2022-01-19 VITALS
HEIGHT: 64 IN | RESPIRATION RATE: 18 BRPM | HEART RATE: 66 BPM | DIASTOLIC BLOOD PRESSURE: 86 MMHG | WEIGHT: 207 LBS | BODY MASS INDEX: 35.34 KG/M2 | SYSTOLIC BLOOD PRESSURE: 115 MMHG | OXYGEN SATURATION: 95 % | TEMPERATURE: 98.7 F

## 2022-01-19 PROBLEM — H02.105 ECTROPION OF LEFT LOWER EYELID: Status: ACTIVE | Noted: 2022-01-19

## 2022-01-19 PROCEDURE — 92526 ORAL FUNCTION THERAPY: CPT

## 2022-01-19 PROCEDURE — 99238 HOSP IP/OBS DSCHRG MGMT 30/<: CPT | Performed by: PHYSICIAN ASSISTANT

## 2022-01-19 RX ORDER — POLYVINYL ALCOHOL 14 MG/ML
1 SOLUTION/ DROPS OPHTHALMIC 4 TIMES DAILY
Qty: 15 ML | Refills: 0
Start: 2022-01-19

## 2022-01-19 RX ORDER — TRAMADOL HYDROCHLORIDE 50 MG/1
50 TABLET ORAL EVERY 8 HOURS PRN
Qty: 20 TABLET | Refills: 0 | Status: SHIPPED | OUTPATIENT
Start: 2022-01-19 | End: 2022-01-29

## 2022-01-19 RX ADMIN — DOCUSATE SODIUM 100 MG: 100 CAPSULE ORAL at 08:55

## 2022-01-19 RX ADMIN — LOSARTAN POTASSIUM 25 MG: 25 TABLET, FILM COATED ORAL at 08:55

## 2022-01-19 RX ADMIN — PANTOPRAZOLE SODIUM 40 MG: 40 TABLET, DELAYED RELEASE ORAL at 06:42

## 2022-01-19 RX ADMIN — HEPARIN SODIUM 5000 UNITS: 5000 INJECTION INTRAVENOUS; SUBCUTANEOUS at 06:42

## 2022-01-19 NOTE — ASSESSMENT & PLAN NOTE
· Present on admission, initial troponin measured at 54  · Patient started on heparin drip in ED, which has since been discontinued   · EKG without ischemic changes   · Appreciate cardiology inputs, likely 2/2 hypertensive urgency on admission with underlying CKD   · Echocardiogram: LVEF 03%, normal systolic function, no RWMA, grade 1 diastolic dysfunction

## 2022-01-19 NOTE — ASSESSMENT & PLAN NOTE
· Resolved  · Present prior to admission  · CT C/A/P unable to identified probable cause for abdominal pain  · Pain possibly secondary to constipation  · Continue bowel regimen with scheduled Senokot and PRN Miralax  · Constipation now resolved with bowel movement on 1/18

## 2022-01-19 NOTE — CASE MANAGEMENT
Case Management Discharge Planning Note    Patient name Lauraine Koyanagi  Location 2 210/2 [id number] MRN [id number]  : 1921 Date 2022       Current Admission Date: 2022  Current Admission Diagnosis:Ambulatory dysfunction   Patient Active Problem List    Diagnosis Date Noted    Dysphagia 2022    Elevated troponin 2022    Generalized abdominal pain 2022    Stage 3 chronic kidney disease ([facility] [address] ) 2022    Dyspnea on exertion 2021    Generalized weakness 2021    Goals of care, counseling/discussion 2021    Constipation 2021    Persistent dry cough 2021    Insomnia 2021    Ambulatory dysfunction 2021    Multiple falls 2021    Hypertensive emergency 2021    Chronic midline thoracic back pain 2021    Gastroesophageal reflux disease without esophagitis 2021    History of bladder cancer 2021    Acute kidney injury superimposed on chronic kidney disease ([facility] [address] ) 2021      LOS (days): 3  Geometric Mean LOS (GMLOS) (days): 2 60  Days to GMLOS:-0 1     OBJECTIVE:  Risk of Unplanned Readmission Score: 16         Current admission status: Inpatient   Preferred Pharmacy:   [id number] [place], [place] - [address] [facility] [address] [facility]  [address] [facility] [address]  [address] [facility]  Phone: [phone] Fax: [phone]    Primary Care Provider: Nazia Meza MD    Primary Insurance: MEDICARE  Secondary Insurance: [address] [place] [address] DETAILS:     Transport at Discharge : [facility]  Dispatcher Contacted: Yes  Number/Name of Dispatcher: [facility]TS  Transported by [employer] and Unit #): [facility]  ETA of Transport (Date): 22  ETA of Transport (Time): 1000     Transfer Mode: Stretcher      Pt is stable for d/c to SNF this morning -- [facility] accepting 10am transport  Provider and nursing team aware -- No IMM needed due to LOS 2  Son informed via phone -- No questions or concerns   CM will continue to follow to d/c

## 2022-01-19 NOTE — ASSESSMENT & PLAN NOTE
· POA with SBP ranging 185-217 mm Hg on initial presentation along with elevated troponin   · Continued on home Cozaar 25 mg  · Still with intermittent urgency however overall improved and stable on current regimen

## 2022-01-19 NOTE — ASSESSMENT & PLAN NOTE
· Patient presenting with 2 day history of weakness and falls at home   · CT head negative for acute intracranial abnormality  · No evidence acute infectious/metabolic derangements   · CT CAP essentially unremarkable   · PT/OT recommending rehab, CM following - [facility] to [facility]   · Continue fall precautions

## 2022-01-19 NOTE — DISCHARGE SUMMARY
[facility]  Discharge- Hector Chapin 1/28/1921, 80 y o  male MRN: [id number]  Unit/Bed#: CW2 210-01 Encounter: [id number]  Primary Care Provider: Camila Casillas MD   Date and time admitted to hospital: 1/16/2022 12:03 PM    * Ambulatory dysfunction  Assessment & Plan  · Patient presenting with 2 day history of weakness and falls at home   · CT head negative for acute intracranial abnormality  · No evidence acute infectious/metabolic derangements   · CT CAP essentially unremarkable   · PT/OT recommending rehab, CM following - discharged to [facility]   · Continue fall precautions    Elevated troponin  Assessment & Plan  · Present on admission, initial troponin measured at 54  · Patient started on heparin drip in ED, which has since been discontinued   · EKG without ischemic changes   · Appreciate cardiology inputs, likely 2/2 hypertensive urgency on admission with underlying CKD   · Echocardiogram: LVEF 87%, normal systolic function, no RWMA, grade 1 diastolic dysfunction     Hypertensive emergency  Assessment & Plan  · POA with SBP ranging 185-217 mm Hg on initial presentation along with elevated troponin   · Continued on home Cozaar 25 mg  · Still with intermittent urgency however overall improved and stable on current regimen       Generalized abdominal pain  Assessment & Plan  · Resolved  · Present prior to admission  · CT C/A/P unable to identified probable cause for abdominal pain  · Pain possibly secondary to constipation  · Continue bowel regimen with scheduled Senokot and PRN Miralax  · Constipation now resolved with bowel movement on 1/18    Stage 3 chronic kidney disease [facility])  Assessment & Plan  Lab Results   Component Value Date    EGFR 36 01/17/2022    EGFR 33 01/16/2022    EGFR 38 02/26/2021    CREATININE 1 54 (H) 01/17/2022    CREATININE 1 67 (H) 01/16/2022    CREATININE 1 50 (H) 02/26/2021     · Chronic and stable  · Baseline creatinine approximately 1 5-1 7 since 2021  · Monitor as needed    Gastroesophageal reflux disease without esophagitis  Assessment & Plan  · Home Prilosec non formulary  · Continue Protonix 40 mg daily    Ectropion of left lower eyelid  Assessment & Plan  · Noted on exam   · Start artificial tears 4 times daily   · Outpatient follow-up with PCP     Dysphagia  Assessment & Plan  · Appreciate speech therapy recommendations   · Dental soft diet with thin liquids   · Aspiration precautions     Chronic midline thoracic back pain  Assessment & Plan  · Continue home Tramadol 50 mg q8h prn   · PT/OT    Medical Problems             Resolved Problems  Date Reviewed: 1/19/2022    None              Discharging Physician / Practitioner: Dallas Rodriguez PA-C  PCP: Darline Capone MD  Admission Date:   Admission Orders (From admission, onward)     Ordered        01/17/22 0721  Inpatient Admission  Once                      Discharge Date: 01/19/22    Consultations During Hospital Stay:  · Cardiology    Procedures Performed:   · None    Significant Findings / Test Results:   · Outlined above    Incidental Findings:   · None     Test Results Pending at Discharge (will require follow up): · None     Outpatient Tests Requested:  · Follow-up with PCP    Complications:  None    Reason for Admission:  Ambulatory dysfunction    Hospital Course:   Washington Oconnor is a 80 y o  male patient who originally presented to the hospital on 1/16/2022 due to 2 day history of generalized weakness and ambulatory dysfunction with falls at home  Upon presentation patient was found to have hypertensive emergency with SBP ranging between 180 and 200 as well as elevated troponin  He was initially started on heparin drip which was subsequently discontinued  He was seen in consultation by Cardiology who felt elevated troponin likely due to hypertensive emergency on admission with underlying CKD    Patient underwent echocardiogram with results outlined above however was essentially unremarkable  PT/OT recommended rehab and patient was subsequently discharged to [facility]  Blood pressure remained acceptable on home medical management  No new changes to patient's medications were made during this admission  Of note, patient with some delirium at night and received low-dose Seroquel which can be as needed moving forward  He was also found to have ectropion of left lower eyelid and should continue to use artificial tears and follow-up with PCP  Please see above list of diagnoses and related plan for additional information  Condition at Discharge: fair    Discharge Day Visit / Exam:   Subjective:  Patient offers no new complaints today  Danielle Avila for discharge  Vitals: Blood Pressure: 96/52 (01/19/22 0755)  Pulse: 66 (01/19/22 0755)  Temperature: 98 7 °F (37 1 °C) (01/19/22 0755)  Temp Source: Oral (01/19/22 0755)  Respirations: 18 (01/19/22 0755)  Height: 5' 4" (162 6 cm) (01/17/22 1415)  Weight - Scale: 93 9 kg (207 lb) (01/17/22 1415)  SpO2: 95 % (01/19/22 0755)  Exam:   Physical Exam  Vitals and nursing note reviewed  Constitutional:       General: He is not in acute distress  Cardiovascular:      Rate and Rhythm: Normal rate and regular rhythm  Pulmonary:      Effort: Pulmonary effort is normal  No respiratory distress  Breath sounds: No wheezing or rales  Skin:     Findings: Bruising present  Neurological:      General: No focal deficit present  Mental Status: He is alert and oriented to person, place, and time  Mental status is at baseline  Discharge instructions/Information to patient and family:   See after visit summary for information provided to patient and family  Provisions for Follow-Up Care:  See after visit summary for information related to follow-up care and any pertinent home health orders         Disposition:   Other [place] [facility] Communications Readmission: no     Discharge Statement:  I spent 20 minutes discharging the patient  This time was spent on the day of discharge  I had direct contact with the patient on the day of discharge  Greater than 50% of the total time was spent examining patient, answering all patient questions, arranging and discussing plan of care with patient as well as directly providing post-discharge instructions  Additional time then spent on discharge activities  Discharge Medications:  See after visit summary for reconciled discharge medications provided to patient and/or family        **Please Note: This note may have been constructed using a voice recognition system**

## 2022-01-21 LAB
BACTERIA BLD CULT: NORMAL
BACTERIA BLD CULT: NORMAL

## 2022-07-08 NOTE — SPEECH THERAPY NOTE
Speech/Language Pathology Progress Note    Patient Name: Rodrigo Cortes  YAQEF'E Date: 1/19/2022    Subjective:  Pt awake and alert, seated upright in bed  No complaints offered, planned d/c at 10am     Objective:  Pt seen for diagnostic swallow therapy  Current diet is dysphagia 2 mechanical soft with thin liquids  Provider reached out to SLP yesterday as pt was on a dysphagia 3 dental soft diet and reportedly was having difficulty masticating/swallowing  Diet was downgraded, and pt seen this morning by speech  Breakfast completed  Per PCA, pt is still struggling with some items on dysphagia 2 diet (ground sausage) and coughed several times during breakfast    Pt was offered softened rice krispies cereal for assessment of mastication of soft solids  He was able to tolerate this texture when fed at a slow rate  Disorganized bolus formation noted but ultimately pt was able to swallow without s/s aspiration  He does breathe through his mouth while chewing which results in further disorganization  Assessment:  Slow intake with mechanical soft diet  Pt is requesting feeding assistance (past 2 days) due to weakness  Plan/Recommendations:  Continue mechanical soft diet for now, thin liquids okay  Recommend continued f/u with ST at next level of care, may need to consider downgrade to puree, at least for meats  Quality 130: Documentation Of Current Medications In The Medical Record: Current Medications Documented Quality 226: Preventive Care And Screening: Tobacco Use: Screening And Cessation Intervention: Patient screened for tobacco use and is an ex/non-smoker Detail Level: Detailed

## 2023-07-11 ENCOUNTER — NURSING HOME VISIT (OUTPATIENT)
Dept: WOUND CARE | Facility: HOSPITAL | Age: 88
End: 2023-07-11
Payer: MEDICARE

## 2023-07-11 DIAGNOSIS — S51.811A SKIN TEAR OF RIGHT FOREARM WITHOUT COMPLICATION, INITIAL ENCOUNTER: Primary | ICD-10-CM

## 2023-07-11 DIAGNOSIS — R26.2 AMBULATORY DYSFUNCTION: ICD-10-CM

## 2023-07-11 PROCEDURE — 99306 1ST NF CARE HIGH MDM 50: CPT | Performed by: NURSE PRACTITIONER

## 2023-07-11 NOTE — PROGRESS NOTES
[facility]       Patient ID: Stephanie Villegas is a 80 y.o. male Date of Birth 1/28/1921     Location of Service: [facility]    Performed wound round with: Wound team     Chief Complaint : Right forearm    Wound Instructions:  Wound: Right forearm  Cleanse with normal saline solution wound cleanser  Apply Skin-Prep to periwound area  Apply bordered foam to wound bed  3 times a week and as needed for soiling    Allergies  Patient has no known allergies. Assessment & Plan:  1. Skin tear of right forearm without complication, initial encounter  Assessment & Plan:  Right forearm  Partial-thickness, with no obvious sign of infection  Local wound care with bordered foam  Continue to offload  Follow-up next week      2. Ambulatory dysfunction  Assessment & Plan: On short-term rehab             Subjective:   July 11, 2023. This is a new consult for wound on the right forearm. Patient currently admitted at [facility] for short-term rehab. Patient have a complex medical history including but not limited to hypertension, GERD, dysplasia, and chronic midline thoracic back pain. Patient was seen with the facility wound team.    Wound history: As per report, patient was admitted on July 5, 2023 with skin tear on the right forearm. Current treatment is bordered foam    Received patient in bed, seems comfortable. Denies pain. Patient is alert and oriented. Patient needs assistance in turning in bed. No other significant issues related to the wound. Review of Systems   Constitutional: Negative. Respiratory: Negative. Gastrointestinal: Negative. Skin: Positive for wound. Psychiatric/Behavioral: Negative. Objective:    Physical Exam  Constitutional:       Appearance: Normal appearance. Cardiovascular:      Rate and Rhythm: Normal rate. Pulmonary:      Effort: Pulmonary effort is normal.   Skin:     Findings: Lesion present. Comments: Right forearm: Wound size is 1.5 x 0.5 x 0.1 cm.,  100% epithelial, periwound is normal, small amount of serous drainage, no tenderness, no obvious sign of infection   Neurological:      Mental Status: He is alert. Psychiatric:         Mood and Affect: Mood normal.         Behavior: Behavior normal.              Procedures           Patient's care was coordinated with nursing facility staff. Recent vitals, labs and updated medications were reviewed on EMR or chart system of facility. Past Medical, surgical, social, medication and allergy history and patient's previous records were reviewed and updated as appropriate: Most up-to date information is available in the facility EMR where the patient is currently admitted. Patient Active Problem List   Diagnosis   • Ambulatory dysfunction   • Multiple falls   • Hypertensive emergency   • Chronic midline thoracic back pain   • Gastroesophageal reflux disease without esophagitis   • History of bladder cancer   • Acute kidney injury superimposed on chronic kidney disease (HCC)   • Constipation   • Persistent dry cough   • Insomnia   • Dyspnea on exertion   • Generalized weakness   • Goals of care, counseling/discussion   • Elevated troponin   • Generalized abdominal pain   • Stage 3 chronic kidney disease (HCC)   • Dysphagia   • Ectropion of left lower eyelid   • Skin tear of right forearm without complication     Past Medical History:   Diagnosis Date   • BPH with obstruction/lower urinary tract symptoms    • Chronic kidney disease    • GERD (gastroesophageal reflux disease)    • History of bladder cancer    • Hypertension    • Spinal stenosis      Past Surgical History:   Procedure Laterality Date   • ANKLE ARTHROSCOPY     • BLEPHAROPLASTY     • GALLBLADDER SURGERY     • KNEE ARTHROSCOPY       Social History     Socioeconomic History   • Marital status:       Spouse name: None   • Number of children: None   • Years of education: None   • Highest education level: None   Occupational History   • None   Tobacco Use   • Smoking status: Former   • Smokeless tobacco: Never   Vaping Use   • Vaping Use: Never used   Substance and Sexual Activity   • Alcohol use: Never   • Drug use: Never   • Sexual activity: None   Other Topics Concern   • None   Social History Narrative   • None     Social Determinants of Health     Financial Resource Strain: Not on file   Food Insecurity: Not on file   Transportation Needs: Not on file   Physical Activity: Not on file   Stress: Not on file   Social Connections: Not on file   Intimate Partner Violence: Not on file   Housing Stability: Not on file        Current Outpatient Medications:   •  losartan (COZAAR) 50 mg tablet, TK 1 T PO QD, Disp: , Rfl:   •  Melatonin 1 MG CAPS, Take 3 mg by mouth daily at bedtime, Disp: , Rfl:   •  Multiple Vitamins-Minerals (CENTRUM SILVER ADULT 50+ PO), Take by mouth, Disp: , Rfl:   •  omeprazole (PriLOSEC) 20 mg delayed release capsule, , Disp: , Rfl:   •  polyvinyl alcohol (LIQUIFILM TEARS) 1.4 % ophthalmic solution, Administer 1 drop into the left eye 4 (four) times a day, Disp: 15 mL, Rfl: 0  •  senna-docusate sodium (SENOKOT S) 8.6-50 mg per tablet, Take 2 tablets by mouth daily at bedtime, Disp: , Rfl:   Family History   Problem Relation Age of Onset   • Cancer Brother               Coordination of Care: Wound team aware of the treatment plan. Facility nurse will provide wound treatment and monitor the wound for any changes. Patient / Staff education : Patient / Staff was given education on sign of infection and pressure ulcer prevention. Patient/ Staff verbalized understanding     Follow up :  Next week    Voice-recognition software may have been used in the preparation of this document. Occasional wrong word or "sound-alike" substitutions may have occurred due to the inherent limitations of voice recognition software. Interpretation should be guided by context.       Elise Gaona CRNP

## 2023-07-11 NOTE — ASSESSMENT & PLAN NOTE
Right forearm  Partial-thickness, with no obvious sign of infection  Local wound care with bordered foam  Continue to offload  Follow-up next week 0 (no pain/absence of nonverbal indicators of pain)

## 2023-07-18 ENCOUNTER — NURSING HOME VISIT (OUTPATIENT)
Dept: WOUND CARE | Facility: HOSPITAL | Age: 88
End: 2023-07-18
Payer: MEDICARE

## 2023-07-18 DIAGNOSIS — L97.909 VENOUS ULCER (HCC): ICD-10-CM

## 2023-07-18 DIAGNOSIS — R26.2 AMBULATORY DYSFUNCTION: ICD-10-CM

## 2023-07-18 DIAGNOSIS — I83.009 VENOUS ULCER (HCC): ICD-10-CM

## 2023-07-18 DIAGNOSIS — S51.811A SKIN TEAR OF RIGHT FOREARM WITHOUT COMPLICATION, INITIAL ENCOUNTER: Primary | ICD-10-CM

## 2023-07-18 PROCEDURE — 99308 SBSQ NF CARE LOW MDM 20: CPT | Performed by: NURSE PRACTITIONER

## 2023-07-18 NOTE — PROGRESS NOTES
[facility]       Patient ID: Benson Hawthorne is a 80 y.o. male Date of Birth 1/28/1921     Location of Service: [facility]    Performed wound round with: Wound team     Chief Complaint : Right forearm    Wound Instructions:  Wound: Right forearm  Cleanse with normal saline solution wound cleanser  Apply Skin-Prep to periwound area and wound bed  Daily and as needed for soiling    Wound: Left lateral ankle  Cleanse the wound bed with normal saline solution or wound cleanser  Apply Skin-Prep to periwound area and wound bed  Daily and as needed for soiling    Offload all wounds  Allergies  Patient has no known allergies. Assessment & Plan:  1. Skin tear of right forearm without complication, initial encounter  Assessment & Plan: The wound on the right forearm improved, decreasing wound size, partial-thickness  Change local wound care to Skin-Prep  Follow-up next week      2. Venous ulcer ([address])  Assessment & Plan:  Left lateral ankle  Wound improved, 100% epithelial, with no obvious sign of infection  Local wound care with Skin-Prep  Continue to offload  Follow-up next week      3. Ambulatory dysfunction           Subjective:   July 11, 2023. This is a new consult for wound on the right forearm. Patient currently admitted at [facility] for short-term rehab. Patient have a complex medical history including but not limited to hypertension, GERD, dysplasia, and chronic midline thoracic back pain. Patient was seen with the facility wound team.    Wound history: As per report, patient was admitted on July 5, 2023 with skin tear on the right forearm. Current treatment is bordered foam    Received patient in bed, seems comfortable. Denies pain. Patient is alert and oriented. Patient needs assistance in turning in bed. No other significant issues related to the wound. July 18, 2023.   Follow-up for wound on the right forearm and new wound on the left lateral ankle received patient in bed, seems comfortable. Denies pain. The wound on the left lateral ankle was discovered on July 11, 2023. Review of Systems   Constitutional: Negative. Respiratory: Negative. Gastrointestinal: Negative. Skin: Positive for wound. Psychiatric/Behavioral: Negative. Objective:    Physical Exam  Constitutional:       Appearance: Normal appearance. Cardiovascular:      Rate and Rhythm: Normal rate. Pulmonary:      Effort: Pulmonary effort is normal.   Skin:     Findings: Lesion present. Comments: Right forearm: Wound size is 1 x 0.2 x 0.1 cm.,  100% epithelial, small amount of serous drainage, periwound is ecchymotic area no obvious sign of infection    Left lateral ankle: Wound size is 0.5 x 0.3 x 0.2 cm.,  100% epithelial, periwound is normal, no drainage, no obvious sign of infection   Neurological:      Mental Status: He is alert. Psychiatric:         Mood and Affect: Mood normal.         Behavior: Behavior normal.              Procedures           Patient's care was coordinated with nursing facility staff. Recent vitals, labs and updated medications were reviewed on EMR or chart system of facility. Past Medical, surgical, social, medication and allergy history and patient's previous records were reviewed and updated as appropriate: Most up-to date information is available in the facility EMR where the patient is currently admitted.     Patient Active Problem List   Diagnosis   • Ambulatory dysfunction   • Multiple falls   • Hypertensive emergency   • Chronic midline thoracic back pain   • Gastroesophageal reflux disease without esophagitis   • History of bladder cancer   • Acute kidney injury superimposed on chronic kidney disease (HCC)   • Constipation   • Persistent dry cough   • Insomnia   • Dyspnea on exertion   • Generalized weakness   • Goals of care, counseling/discussion   • Elevated troponin   • Generalized abdominal pain   • Stage 3 chronic kidney disease ([address])   • Dysphagia   • Ectropion of left lower eyelid   • Skin tear of right forearm without complication   • Venous ulcer (HCC)     Past Medical History:   Diagnosis Date   • BPH with obstruction/lower urinary tract symptoms    • Chronic kidney disease    • GERD (gastroesophageal reflux disease)    • History of bladder cancer    • Hypertension    • Spinal stenosis      Past Surgical History:   Procedure Laterality Date   • ANKLE ARTHROSCOPY     • BLEPHAROPLASTY     • GALLBLADDER SURGERY     • KNEE ARTHROSCOPY       Social History     Socioeconomic History   • Marital status:       Spouse name: None   • Number of children: None   • Years of education: None   • Highest education level: None   Occupational History   • None   Tobacco Use   • Smoking status: Former   • Smokeless tobacco: Never   Vaping Use   • Vaping Use: Never used   Substance and Sexual Activity   • Alcohol use: Never   • Drug use: Never   • Sexual activity: None   Other Topics Concern   • None   Social History Narrative   • None     Social Determinants of Health     Financial Resource Strain: Not on file   Food Insecurity: Not on file   Transportation Needs: Not on file   Physical Activity: Not on file   Stress: Not on file   Social Connections: Not on file   Intimate Partner Violence: Not on file   Housing Stability: Not on file        Current Outpatient Medications:   •  losartan (COZAAR) 50 mg tablet, TK 1 T PO QD, Disp: , Rfl:   •  Melatonin 1 MG CAPS, Take 3 mg by mouth daily at bedtime, Disp: , Rfl:   •  Multiple Vitamins-Minerals (CENTRUM SILVER ADULT 50+ PO), Take by mouth, Disp: , Rfl:   •  omeprazole (PriLOSEC) 20 mg delayed release capsule, , Disp: , Rfl:   •  polyvinyl alcohol (LIQUIFILM TEARS) 1.4 % ophthalmic solution, Administer 1 drop into the left eye 4 (four) times a day, Disp: 15 mL, Rfl: 0  •  senna-docusate sodium (SENOKOT S) 8.6-50 mg per tablet, Take 2 tablets by mouth daily at bedtime, Disp: , Rfl:   Family History   Problem Relation Age of Onset   • Cancer Brother               Coordination of Care: Wound team aware of the treatment plan. Facility nurse will provide wound treatment and monitor the wound for any changes. Patient / Staff education : Patient / Staff was given education on sign of infection and pressure ulcer prevention. Patient/ Staff verbalized understanding     Follow up :  Next week    Voice-recognition software may have been used in the preparation of this document. Occasional wrong word or "sound-alike" substitutions may have occurred due to the inherent limitations of voice recognition software. Interpretation should be guided by context.       ASAD Hopkins

## 2023-07-18 NOTE — ASSESSMENT & PLAN NOTE
Left lateral ankle  Wound improved, 100% epithelial, with no obvious sign of infection  Local wound care with Skin-Prep  Continue to offload  Follow-up next week

## 2023-07-18 NOTE — ASSESSMENT & PLAN NOTE
The wound on the right forearm improved, decreasing wound size, partial-thickness  Change local wound care to Skin-Prep  Follow-up next week

## 2023-07-25 ENCOUNTER — NURSING HOME VISIT (OUTPATIENT)
Dept: WOUND CARE | Facility: HOSPITAL | Age: 88
End: 2023-07-25
Payer: MEDICARE

## 2023-07-25 DIAGNOSIS — R26.2 AMBULATORY DYSFUNCTION: ICD-10-CM

## 2023-07-25 DIAGNOSIS — I83.009 VENOUS ULCER (HCC): Primary | ICD-10-CM

## 2023-07-25 DIAGNOSIS — L97.909 VENOUS ULCER (HCC): Primary | ICD-10-CM

## 2023-07-25 PROCEDURE — 99308 SBSQ NF CARE LOW MDM 20: CPT | Performed by: NURSE PRACTITIONER

## 2023-07-25 NOTE — PROGRESS NOTES
[facility]       Patient ID: Karla Tyson is a 80 y.o. male Date of Birth 1/28/1921     Location of Service: [facility]    Performed wound round with: Wound team     Chief Complaint : Left lateral ankle    Wound Instructions:  Wound: Left lateral ankle  Cleanse the wound bed with normal saline solution or wound cleanser  Apply Skin-Prep to periwound area and wound bed  Daily and as needed for soiling    Offload all wounds  Allergies  Patient has no known allergies. Assessment & Plan:  1. Venous ulcer ([address])  Assessment & Plan:  Left lateral ankle  Wound is stable, 50% epithelial and 50% eschar, with no obvious sign of infection  Continue local wound care with Skin-Prep  Continue to offload  Follow-up next week      2. Ambulatory dysfunction           Subjective:   July 11, 2023. This is a new consult for wound on the right forearm. Patient currently admitted at [facility] for short-term rehab. Patient have a complex medical history including but not limited to hypertension, GERD, dysplasia, and chronic midline thoracic back pain. Patient was seen with the facility wound team.    Wound history: As per report, patient was admitted on July 5, 2023 with skin tear on the right forearm. Current treatment is bordered foam    Received patient in bed, seems comfortable. Denies pain. Patient is alert and oriented. Patient needs assistance in turning in bed. No other significant issues related to the wound. July 18, 2023. Follow-up for wound on the right forearm and new wound on the left lateral ankle received patient in bed, seems comfortable. Denies pain. The wound on the left lateral ankle was discovered on July 11, 2023. July 25, 2023. Follow-up for wound on the left lateral ankle. Received patient in chair, seems comfortable. As per report, the family member is asking if they can use Calmoseptine for the wound.   Explained to the nurse that Calmoseptine may not be appropriate to the wound since it does not have any drainage. Patient denies pain. As per patient, he is not diabetic. Review of Systems   Constitutional: Negative. Respiratory: Negative. Gastrointestinal: Negative. Skin: Positive for wound. Psychiatric/Behavioral: Negative. Objective:    Physical Exam  Constitutional:       Appearance: Normal appearance. Cardiovascular:      Rate and Rhythm: Normal rate. Pulmonary:      Effort: Pulmonary effort is normal.   Skin:     Findings: Lesion present. Comments: Left lateral ankle: Wound size is 0.5 x 0.5 x 0.1 cm.,  50% epithelial, 50% eschar, no drainage, no obvious sign of infection   Neurological:      Mental Status: He is alert. Psychiatric:         Mood and Affect: Mood normal.         Behavior: Behavior normal.              Procedures           Patient's care was coordinated with nursing facility staff. Recent vitals, labs and updated medications were reviewed on EMR or chart system of facility. Past Medical, surgical, social, medication and allergy history and patient's previous records were reviewed and updated as appropriate: Most up-to date information is available in the facility EMR where the patient is currently admitted.     Patient Active Problem List   Diagnosis   • Ambulatory dysfunction   • Multiple falls   • Hypertensive emergency   • Chronic midline thoracic back pain   • Gastroesophageal reflux disease without esophagitis   • History of bladder cancer   • Acute kidney injury superimposed on chronic kidney disease (HCC)   • Constipation   • Persistent dry cough   • Insomnia   • Dyspnea on exertion   • Generalized weakness   • Goals of care, counseling/discussion   • Elevated troponin   • Generalized abdominal pain   • Stage 3 chronic kidney disease (HCC)   • Dysphagia   • Ectropion of left lower eyelid   • Skin tear of right forearm without complication   • Venous ulcer ([address]) Past Medical History:   Diagnosis Date   • BPH with obstruction/lower urinary tract symptoms    • Chronic kidney disease    • GERD (gastroesophageal reflux disease)    • History of bladder cancer    • Hypertension    • Spinal stenosis      Past Surgical History:   Procedure Laterality Date   • ANKLE ARTHROSCOPY     • BLEPHAROPLASTY     • GALLBLADDER SURGERY     • KNEE ARTHROSCOPY       Social History     Socioeconomic History   • Marital status:      Spouse name: Not on file   • Number of children: Not on file   • Years of education: Not on file   • Highest education level: Not on file   Occupational History   • Not on file   Tobacco Use   • Smoking status: Former   • Smokeless tobacco: Never   Vaping Use   • Vaping Use: Never used   Substance and Sexual Activity   • Alcohol use: Never   • Drug use: Never   • Sexual activity: Not on file   Other Topics Concern   • Not on file   Social History Narrative   • Not on file     Social Determinants of Health     Financial Resource Strain: Not on file   Food Insecurity: No Food Insecurity (1/17/2022)    Hunger Vital Sign    • Worried About Running Out of Food in the Last Year: Never true    • Ran Out of Food in the Last Year: Never true   Transportation Needs: No Transportation Needs (1/17/2022)    PRAPARE - Transportation    • Lack of Transportation (Medical): No    • Lack of Transportation (Non-Medical):  No   Physical Activity: Not on file   Stress: Not on file   Social Connections: Not on file   Intimate Partner Violence: Not on file   Housing Stability: Low Risk  (1/17/2022)    Housing Stability Vital Sign    • Unable to Pay for Housing in the Last Year: No    • Number of Places Lived in the Last Year: 1    • Unstable Housing in the Last Year: No        Current Outpatient Medications:   •  losartan (COZAAR) 50 mg tablet, TK 1 T PO QD, Disp: , Rfl:   •  Melatonin 1 MG CAPS, Take 3 mg by mouth daily at bedtime, Disp: , Rfl:   •  Multiple Vitamins-Minerals (CENTRUM SILVER ADULT 50+ PO), Take by mouth, Disp: , Rfl:   •  omeprazole (PriLOSEC) 20 mg delayed release capsule, , Disp: , Rfl:   •  polyvinyl alcohol (LIQUIFILM TEARS) 1.4 % ophthalmic solution, Administer 1 drop into the left eye 4 (four) times a day, Disp: 15 mL, Rfl: 0  •  senna-docusate sodium (SENOKOT S) 8.6-50 mg per tablet, Take 2 tablets by mouth daily at bedtime, Disp: , Rfl:   Family History   Problem Relation Age of Onset   • Cancer Brother               Coordination of Care: Wound team aware of the treatment plan. Facility nurse will provide wound treatment and monitor the wound for any changes. Patient / Staff education : Patient / Staff was given education on sign of infection and pressure ulcer prevention. Patient/ Staff verbalized understanding     Follow up :  Next week    Voice-recognition software may have been used in the preparation of this document. Occasional wrong word or "sound-alike" substitutions may have occurred due to the inherent limitations of voice recognition software. Interpretation should be guided by context.       ASAD Lopez

## 2023-07-25 NOTE — ASSESSMENT & PLAN NOTE
Left lateral ankle  Wound is stable, 50% epithelial and 50% eschar, with no obvious sign of infection  Continue local wound care with Skin-Prep  Continue to offload  Follow-up next week

## 2023-08-01 ENCOUNTER — NURSING HOME VISIT (OUTPATIENT)
Dept: WOUND CARE | Facility: HOSPITAL | Age: 88
End: 2023-08-01
Payer: MEDICARE

## 2023-08-01 DIAGNOSIS — R26.2 AMBULATORY DYSFUNCTION: ICD-10-CM

## 2023-08-01 DIAGNOSIS — I83.009 VENOUS ULCER (HCC): Primary | ICD-10-CM

## 2023-08-01 DIAGNOSIS — L97.909 VENOUS ULCER (HCC): Primary | ICD-10-CM

## 2023-08-01 PROCEDURE — 99308 SBSQ NF CARE LOW MDM 20: CPT | Performed by: NURSE PRACTITIONER

## 2023-08-01 NOTE — ASSESSMENT & PLAN NOTE
Left lateral ankle  Improved, decreasing wound size, superficial scab  Continue local wound care with Skin-Prep  Follow-up in 2 weeks

## 2023-08-01 NOTE — PROGRESS NOTES
[facility]       Patient ID: Breezy Bach is a 80 y.o. male Date of Birth 1/28/1921     Location of Service: [facility]    Performed wound round with: Wound team     Chief Complaint : Left lateral ankle    Wound Instructions:  Wound: Left lateral ankle  Cleanse the wound bed with normal saline solution or wound cleanser  Apply Skin-Prep to periwound area and wound bed  Daily and as needed for soiling    Offload all wounds  Allergies  Patient has no known allergies. Assessment & Plan:  1. Venous ulcer ([address])  Assessment & Plan:  Left lateral ankle  Improved, decreasing wound size, superficial scab  Continue local wound care with Skin-Prep  Follow-up in 2 weeks      2. Ambulatory dysfunction  Assessment & Plan: On short-term rehab             Subjective:   July 11, 2023. This is a new consult for wound on the right forearm. Patient currently admitted at [facility] for short-term rehab. Patient have a complex medical history including but not limited to hypertension, GERD, dysplasia, and chronic midline thoracic back pain. Patient was seen with the facility wound team.    Wound history: As per report, patient was admitted on July 5, 2023 with skin tear on the right forearm. Current treatment is bordered foam    Received patient in bed, seems comfortable. Denies pain. Patient is alert and oriented. Patient needs assistance in turning in bed. No other significant issues related to the wound. July 18, 2023. Follow-up for wound on the right forearm and new wound on the left lateral ankle received patient in bed, seems comfortable. Denies pain. The wound on the left lateral ankle was discovered on July 11, 2023. July 25, 2023. Follow-up for wound on the left lateral ankle. Received patient in chair, seems comfortable. As per report, the family member is asking if they can use Calmoseptine for the wound.   Explained to the nurse that Calmoseptine may not be appropriate to the wound since it does not have any drainage. Patient denies pain. As per patient, he is not diabetic. August 1, 2023. Follow-up for wound on the left lateral ankle. Received patient in chair, still complaining of pain on left foot. As per patient, sometimes and having pain when I move my foot. No other significant issues related to the wound. Review of Systems   Constitutional: Negative. Respiratory: Negative. Gastrointestinal: Negative. Skin: Positive for wound. Psychiatric/Behavioral: Negative. Objective:    Physical Exam  Constitutional:       Appearance: Normal appearance. Cardiovascular:      Rate and Rhythm: Normal rate. Pulmonary:      Effort: Pulmonary effort is normal.   Skin:     Findings: Lesion present. Comments: Left lateral ankle: Wound size is 0.2 x 0.2 cm.,  100% scab, no drainage, no redness, no obvious sign of infection   Neurological:      Mental Status: He is alert. Psychiatric:         Mood and Affect: Mood normal.         Behavior: Behavior normal.              Procedures           Patient's care was coordinated with nursing facility staff. Recent vitals, labs and updated medications were reviewed on EMR or chart system of facility. Past Medical, surgical, social, medication and allergy history and patient's previous records were reviewed and updated as appropriate: Most up-to date information is available in the facility EMR where the patient is currently admitted.     Patient Active Problem List   Diagnosis   • Ambulatory dysfunction   • Multiple falls   • Hypertensive emergency   • Chronic midline thoracic back pain   • Gastroesophageal reflux disease without esophagitis   • History of bladder cancer   • Acute kidney injury superimposed on chronic kidney disease (HCC)   • Constipation   • Persistent dry cough   • Insomnia   • Dyspnea on exertion   • Generalized weakness   • Goals of care, counseling/discussion   • Elevated troponin   • Generalized abdominal pain   • Stage 3 chronic kidney disease (HCC)   • Dysphagia   • Ectropion of left lower eyelid   • Skin tear of right forearm without complication   • Venous ulcer (HCC)     Past Medical History:   Diagnosis Date   • BPH with obstruction/lower urinary tract symptoms    • Chronic kidney disease    • GERD (gastroesophageal reflux disease)    • History of bladder cancer    • Hypertension    • Spinal stenosis      Past Surgical History:   Procedure Laterality Date   • ANKLE ARTHROSCOPY     • BLEPHAROPLASTY     • GALLBLADDER SURGERY     • KNEE ARTHROSCOPY       Social History     Socioeconomic History   • Marital status:      Spouse name: None   • Number of children: None   • Years of education: None   • Highest education level: None   Occupational History   • None   Tobacco Use   • Smoking status: Former   • Smokeless tobacco: Never   Vaping Use   • Vaping Use: Never used   Substance and Sexual Activity   • Alcohol use: Never   • Drug use: Never   • Sexual activity: None   Other Topics Concern   • None   Social History Narrative   • None     Social Determinants of Health     Financial Resource Strain: Not on file   Food Insecurity: No Food Insecurity (1/17/2022)    Hunger Vital Sign    • Worried About Running Out of Food in the Last Year: Never true    • Ran Out of Food in the Last Year: Never true   Transportation Needs: No Transportation Needs (1/17/2022)    PRAPARE - Transportation    • Lack of Transportation (Medical): No    • Lack of Transportation (Non-Medical):  No   Physical Activity: Not on file   Stress: Not on file   Social Connections: Not on file   Intimate Partner Violence: Not on file   Housing Stability: Low Risk  (1/17/2022)    Housing Stability Vital Sign    • Unable to Pay for Housing in the Last Year: No    • Number of Places Lived in the Last Year: 1    • Unstable Housing in the Last Year: No        Current Outpatient Medications:   •  losartan (COZAAR) 50 mg tablet, TK 1 T PO QD, Disp: , Rfl:   •  Melatonin 1 MG CAPS, Take 3 mg by mouth daily at bedtime, Disp: , Rfl:   •  Multiple Vitamins-Minerals (CENTRUM SILVER ADULT 50+ PO), Take by mouth, Disp: , Rfl:   •  omeprazole (PriLOSEC) 20 mg delayed release capsule, , Disp: , Rfl:   •  polyvinyl alcohol (LIQUIFILM TEARS) 1.4 % ophthalmic solution, Administer 1 drop into the left eye 4 (four) times a day, Disp: 15 mL, Rfl: 0  •  senna-docusate sodium (SENOKOT S) 8.6-50 mg per tablet, Take 2 tablets by mouth daily at bedtime, Disp: , Rfl:   Family History   Problem Relation Age of Onset   • Cancer Brother               Coordination of Care: Wound team aware of the treatment plan. Facility nurse will provide wound treatment and monitor the wound for any changes. Patient / Staff education : Patient / Staff was given education on sign of infection and pressure ulcer prevention. Patient/ Staff verbalized understanding     Follow up :  2 weeks    Voice-recognition software may have been used in the preparation of this document. Occasional wrong word or "sound-alike" substitutions may have occurred due to the inherent limitations of voice recognition software. Interpretation should be guided by context.       ASAD Simms

## 2023-08-15 ENCOUNTER — NURSING HOME VISIT (OUTPATIENT)
Dept: WOUND CARE | Facility: HOSPITAL | Age: 88
End: 2023-08-15
Payer: MEDICARE

## 2023-08-15 DIAGNOSIS — R26.2 AMBULATORY DYSFUNCTION: ICD-10-CM

## 2023-08-15 DIAGNOSIS — I83.009 VENOUS ULCER (HCC): Primary | ICD-10-CM

## 2023-08-15 DIAGNOSIS — L97.909 VENOUS ULCER (HCC): Primary | ICD-10-CM

## 2023-08-15 PROCEDURE — 99307 SBSQ NF CARE SF MDM 10: CPT | Performed by: NURSE PRACTITIONER

## 2023-08-15 NOTE — ASSESSMENT & PLAN NOTE
Left lateral ankle  Wound is healed, covered with superficial scab   continue Skin-Prep daily  Sign off. Facility staff will continue to provide treatment and monitor the wound. Can reconsult wound nurse practitioner if wound failed to heal or worsened.

## 2023-08-15 NOTE — PROGRESS NOTES
[facility]       Patient ID: Linda Benavides is a 80 y.o. male Date of Birth 1/28/1921     Location of Service: [facility]    Performed wound round with: Wound team     Chief Complaint : Left lateral ankle    Wound Instructions:  Wound: Left lateral ankle  Cleanse the skin with normal saline solution  Apply Skin-Prep to the left lateral ankle  Daily and as needed for soiling    Offload all wounds  Allergies  Patient has no known allergies. Assessment & Plan:  1. Venous ulcer ([address])  Assessment & Plan:  Left lateral ankle  Wound is healed, covered with superficial scab   continue Skin-Prep daily  Sign off. Facility staff will continue to provide treatment and monitor the wound. Can reconsult wound nurse practitioner if wound failed to heal or worsened. 2. Ambulatory dysfunction           Subjective:   July 11, 2023. This is a new consult for wound on the right forearm. Patient currently admitted at [facility] for short-term rehab. Patient have a complex medical history including but not limited to hypertension, GERD, dysplasia, and chronic midline thoracic back pain. Patient was seen with the facility wound team.    Wound history: As per report, patient was admitted on July 5, 2023 with skin tear on the right forearm. Current treatment is bordered foam    Received patient in bed, seems comfortable. Denies pain. Patient is alert and oriented. Patient needs assistance in turning in bed. No other significant issues related to the wound. July 18, 2023. Follow-up for wound on the right forearm and new wound on the left lateral ankle received patient in bed, seems comfortable. Denies pain. The wound on the left lateral ankle was discovered on July 11, 2023. July 25, 2023. Follow-up for wound on the left lateral ankle. Received patient in chair, seems comfortable.   As per report, the family member is asking if they can use Calmoseptine for the wound. Explained to the nurse that Calmoseptine may not be appropriate to the wound since it does not have any drainage. Patient denies pain. As per patient, he is not diabetic. August 1, 2023. Follow-up for wound on the left lateral ankle. Received patient in chair, still complaining of pain on left foot. As per patient, sometimes and having pain when I move my foot. No other significant issues related to the wound. August 15, 2023. Follow-up for wound on the left lateral ankle. Received patient in chair, seems comfortable. As per staff report, the wound is healed last week. Patient still complaining of pain on left lateral ankle. Review of Systems   Constitutional: Negative. Respiratory: Negative. Gastrointestinal: Negative. Skin: Positive for wound. Psychiatric/Behavioral: Negative. Objective:    Physical Exam  Constitutional:       Appearance: Normal appearance. Cardiovascular:      Rate and Rhythm: Normal rate. Pulmonary:      Effort: Pulmonary effort is normal.   Skin:     Findings: Lesion present. Comments: Left lateral ankle: The wound is covered with scab, healed, with no obvious sign of infection. Patient is complaining of pain on the left lateral ankle but not on the wound. Neurological:      Mental Status: He is alert. Psychiatric:         Mood and Affect: Mood normal.         Behavior: Behavior normal.              Procedures           Patient's care was coordinated with nursing facility staff. Recent vitals, labs and updated medications were reviewed on EMR or chart system of facility. Past Medical, surgical, social, medication and allergy history and patient's previous records were reviewed and updated as appropriate: Most up-to date information is available in the facility EMR where the patient is currently admitted.     Patient Active Problem List   Diagnosis   • Ambulatory dysfunction   • Multiple falls   • Hypertensive emergency   • Chronic midline thoracic back pain   • Gastroesophageal reflux disease without esophagitis   • History of bladder cancer   • Acute kidney injury superimposed on chronic kidney disease (HCC)   • Constipation   • Persistent dry cough   • Insomnia   • Dyspnea on exertion   • Generalized weakness   • Goals of care, counseling/discussion   • Elevated troponin   • Generalized abdominal pain   • Stage 3 chronic kidney disease (HCC)   • Dysphagia   • Ectropion of left lower eyelid   • Skin tear of right forearm without complication   • Venous ulcer (HCC)     Past Medical History:   Diagnosis Date   • BPH with obstruction/lower urinary tract symptoms    • Chronic kidney disease    • GERD (gastroesophageal reflux disease)    • History of bladder cancer    • Hypertension    • Spinal stenosis      Past Surgical History:   Procedure Laterality Date   • ANKLE ARTHROSCOPY     • BLEPHAROPLASTY     • GALLBLADDER SURGERY     • KNEE ARTHROSCOPY       Social History     Socioeconomic History   • Marital status:      Spouse name: None   • Number of children: None   • Years of education: None   • Highest education level: None   Occupational History   • None   Tobacco Use   • Smoking status: Former   • Smokeless tobacco: Never   Vaping Use   • Vaping Use: Never used   Substance and Sexual Activity   • Alcohol use: Never   • Drug use: Never   • Sexual activity: None   Other Topics Concern   • None   Social History Narrative   • None     Social Determinants of Health     Financial Resource Strain: Not on file   Food Insecurity: No Food Insecurity (1/17/2022)    Hunger Vital Sign    • Worried About Running Out of Food in the Last Year: Never true    • Ran Out of Food in the Last Year: Never true   Transportation Needs: No Transportation Needs (1/17/2022)    PRAPARE - Transportation    • Lack of Transportation (Medical): No    • Lack of Transportation (Non-Medical):  No   Physical Activity: Not on file   Stress: Not on file   Social Connections: Not on file   Intimate Partner Violence: Not on file   Housing Stability: Low Risk  (1/17/2022)    Housing Stability Vital Sign    • Unable to Pay for Housing in the Last Year: No    • Number of Places Lived in the Last Year: 1    • Unstable Housing in the Last Year: No        Current Outpatient Medications:   •  losartan (COZAAR) 50 mg tablet, TK 1 T PO QD, Disp: , Rfl:   •  Melatonin 1 MG CAPS, Take 3 mg by mouth daily at bedtime, Disp: , Rfl:   •  Multiple Vitamins-Minerals (CENTRUM SILVER ADULT 50+ PO), Take by mouth, Disp: , Rfl:   •  omeprazole (PriLOSEC) 20 mg delayed release capsule, , Disp: , Rfl:   •  polyvinyl alcohol (LIQUIFILM TEARS) 1.4 % ophthalmic solution, Administer 1 drop into the left eye 4 (four) times a day, Disp: 15 mL, Rfl: 0  •  senna-docusate sodium (SENOKOT S) 8.6-50 mg per tablet, Take 2 tablets by mouth daily at bedtime, Disp: , Rfl:   Family History   Problem Relation Age of Onset   • Cancer Brother               Coordination of Care: Wound team aware of the treatment plan. Facility nurse will provide wound treatment and monitor the wound for any changes. Patient / Staff education : Patient / Staff was given education on sign of infection and pressure ulcer prevention. Patient/ Staff verbalized understanding     Follow up :  Sign off    Voice-recognition software may have been used in the preparation of this document. Occasional wrong word or "sound-alike" substitutions may have occurred due to the inherent limitations of voice recognition software. Interpretation should be guided by context.       ASAD Carbone

## 2023-08-22 ENCOUNTER — NURSING HOME VISIT (OUTPATIENT)
Dept: WOUND CARE | Facility: HOSPITAL | Age: 88
End: 2023-08-22
Payer: MEDICARE

## 2023-08-22 DIAGNOSIS — L85.3 DRY SKIN DERMATITIS: Primary | ICD-10-CM

## 2023-08-22 PROCEDURE — 99308 SBSQ NF CARE LOW MDM 20: CPT | Performed by: NURSE PRACTITIONER

## 2023-08-22 NOTE — ASSESSMENT & PLAN NOTE
Left heel  No open area, dry skin, red, blanchable  Local wound care with hydraguard daily  Continue to offload  Sign off. Facility staff will continue to provide treatment and monitor the wound. Can reconsult wound nurse practitioner if wound failed to heal or worsened.

## 2023-08-22 NOTE — PROGRESS NOTES
[facility]       Patient ID: Lazaro Tate is a 80 y.o. male Date of Birth 1/28/1921     Location of Service: [facility]    Performed wound round with: Wound team     Chief Complaint : Left heel    Wound Instructions:  Wound: Left lateral ankle  Cleanse the skin with normal saline solution  Apply hydraguard to skin  Daily and as needed for soiling    Offload all wounds  Allergies  Patient has no known allergies. Assessment & Plan:  1. Dry skin dermatitis  Assessment & Plan:  Left heel  No open area, dry skin, red, blanchable  Local wound care with hydraguard daily  Continue to offload  Sign off. Facility staff will continue to provide treatment and monitor the wound. Can reconsult wound nurse practitioner if wound failed to heal or worsened. Subjective:   July 11, 2023. This is a new consult for wound on the right forearm. Patient currently admitted at [facility] for short-term rehab. Patient have a complex medical history including but not limited to hypertension, GERD, dysplasia, and chronic midline thoracic back pain. Patient was seen with the facility wound team.    Wound history: As per report, patient was admitted on July 5, 2023 with skin tear on the right forearm. Current treatment is bordered foam    Received patient in bed, seems comfortable. Denies pain. Patient is alert and oriented. Patient needs assistance in turning in bed. No other significant issues related to the wound. July 18, 2023. Follow-up for wound on the right forearm and new wound on the left lateral ankle received patient in bed, seems comfortable. Denies pain. The wound on the left lateral ankle was discovered on July 11, 2023. July 25, 2023. Follow-up for wound on the left lateral ankle. Received patient in chair, seems comfortable. As per report, the family member is asking if they can use Calmoseptine for the wound. Explained to the nurse that Calmoseptine may not be appropriate to the wound since it does not have any drainage. Patient denies pain. As per patient, he is not diabetic. August 1, 2023. Follow-up for wound on the left lateral ankle. Received patient in chair, still complaining of pain on left foot. As per patient, sometimes and having pain when I move my foot. No other significant issues related to the wound. August 15, 2023. Follow-up for wound on the left lateral ankle. Received patient in chair, seems comfortable. As per staff report, the wound is healed last week. Patient still complaining of pain on left lateral ankle. August 22, 2023. Consult for wound on the left heel. Received patient in chair, seems comfortable. Denies pain. As per report, podiatry saw the patient yesterday and discovered a pressure injury on the left heel. Current wound treatment is Betadine. Review of Systems   Constitutional: Negative. Respiratory: Negative. Gastrointestinal: Negative. Skin: Positive for wound. Psychiatric/Behavioral: Negative. Objective:    Physical Exam  Constitutional:       Appearance: Normal appearance. Cardiovascular:      Rate and Rhythm: Normal rate. Pulmonary:      Effort: Pulmonary effort is normal.   Skin:     Findings: Lesion present. Comments: Left heel: Dry skin, no open area, red, blanchable   Neurological:      Mental Status: He is alert. Psychiatric:         Mood and Affect: Mood normal.         Behavior: Behavior normal.              Procedures           Patient's care was coordinated with nursing facility staff. Recent vitals, labs and updated medications were reviewed on EMR or chart system of facility.  Past Medical, surgical, social, medication and allergy history and patient's previous records were reviewed and updated as appropriate: Most up-to date information is available in the facility EMR where the patient is currently admitted. Patient Active Problem List   Diagnosis   • Ambulatory dysfunction   • Multiple falls   • Hypertensive emergency   • Chronic midline thoracic back pain   • Gastroesophageal reflux disease without esophagitis   • History of bladder cancer   • Acute kidney injury superimposed on chronic kidney disease (HCC)   • Constipation   • Persistent dry cough   • Insomnia   • Dyspnea on exertion   • Generalized weakness   • Goals of care, counseling/discussion   • Elevated troponin   • Generalized abdominal pain   • Stage 3 chronic kidney disease (HCC)   • Dysphagia   • Ectropion of left lower eyelid   • Skin tear of right forearm without complication   • Venous ulcer (HCC)   • Dry skin dermatitis     Past Medical History:   Diagnosis Date   • BPH with obstruction/lower urinary tract symptoms    • Chronic kidney disease    • GERD (gastroesophageal reflux disease)    • History of bladder cancer    • Hypertension    • Spinal stenosis      Past Surgical History:   Procedure Laterality Date   • ANKLE ARTHROSCOPY     • BLEPHAROPLASTY     • GALLBLADDER SURGERY     • KNEE ARTHROSCOPY       Social History     Socioeconomic History   • Marital status:       Spouse name: None   • Number of children: None   • Years of education: None   • Highest education level: None   Occupational History   • None   Tobacco Use   • Smoking status: Former   • Smokeless tobacco: Never   Vaping Use   • Vaping Use: Never used   Substance and Sexual Activity   • Alcohol use: Never   • Drug use: Never   • Sexual activity: None   Other Topics Concern   • None   Social History Narrative   • None     Social Determinants of Health     Financial Resource Strain: Not on file   Food Insecurity: No Food Insecurity (1/17/2022)    Hunger Vital Sign    • Worried About Running Out of Food in the Last Year: Never true    • Ran Out of Food in the Last Year: Never true   Transportation Needs: No Transportation Needs (1/17/2022)    PRAPARE - Transportation • Lack of Transportation (Medical): No    • Lack of Transportation (Non-Medical): No   Physical Activity: Not on file   Stress: Not on file   Social Connections: Not on file   Intimate Partner Violence: Not on file   Housing Stability: Low Risk  (1/17/2022)    Housing Stability Vital Sign    • Unable to Pay for Housing in the Last Year: No    • Number of Places Lived in the Last Year: 1    • Unstable Housing in the Last Year: No        Current Outpatient Medications:   •  losartan (COZAAR) 50 mg tablet, TK 1 T PO QD, Disp: , Rfl:   •  Melatonin 1 MG CAPS, Take 3 mg by mouth daily at bedtime, Disp: , Rfl:   •  Multiple Vitamins-Minerals (CENTRUM SILVER ADULT 50+ PO), Take by mouth, Disp: , Rfl:   •  omeprazole (PriLOSEC) 20 mg delayed release capsule, , Disp: , Rfl:   •  polyvinyl alcohol (LIQUIFILM TEARS) 1.4 % ophthalmic solution, Administer 1 drop into the left eye 4 (four) times a day, Disp: 15 mL, Rfl: 0  •  senna-docusate sodium (SENOKOT S) 8.6-50 mg per tablet, Take 2 tablets by mouth daily at bedtime, Disp: , Rfl:   Family History   Problem Relation Age of Onset   • Cancer Brother               Coordination of Care: Wound team aware of the treatment plan. Facility nurse will provide wound treatment and monitor the wound for any changes. Patient / Staff education : Patient / Staff was given education on sign of infection and pressure ulcer prevention. Patient/ Staff verbalized understanding     Follow up :  Sign off    Voice-recognition software may have been used in the preparation of this document. Occasional wrong word or "sound-alike" substitutions may have occurred due to the inherent limitations of voice recognition software. Interpretation should be guided by context.       ASAD Busch

## 2024-02-24 ENCOUNTER — HOSPITAL ENCOUNTER (EMERGENCY)
Facility: HOSPITAL | Age: 89
Discharge: NURSING, REHAB OR LONG-TERM CARE | End: 2024-02-24
Attending: SURGERY | Admitting: SURGERY
Payer: MEDICARE

## 2024-02-24 ENCOUNTER — APPOINTMENT (EMERGENCY)
Dept: RADIOLOGY | Facility: HOSPITAL | Age: 89
End: 2024-02-24
Payer: MEDICARE

## 2024-02-24 ENCOUNTER — APPOINTMENT (EMERGENCY)
Dept: CT IMAGING | Facility: HOSPITAL | Age: 89
End: 2024-02-24
Payer: MEDICARE

## 2024-02-24 VITALS
DIASTOLIC BLOOD PRESSURE: 70 MMHG | RESPIRATION RATE: 18 BRPM | SYSTOLIC BLOOD PRESSURE: 170 MMHG | HEIGHT: 68 IN | OXYGEN SATURATION: 94 % | WEIGHT: 211.2 LBS | TEMPERATURE: 97.9 F | BODY MASS INDEX: 32.01 KG/M2 | HEART RATE: 91 BPM
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
## 2024-02-24 DIAGNOSIS — S02.2XXA NASAL FRACTURE: ICD-10-CM

## 2024-02-24 DIAGNOSIS — S01.21XA LACERATION OF NOSE, INITIAL ENCOUNTER: Primary | ICD-10-CM

## 2024-02-24 LAB
ANION GAP SERPL CALCULATED.3IONS-SCNC: 6 MMOL/L
BASE EXCESS BLDA CALC-SCNC: 1 MMOL/L (ref -2–3)
BASOPHILS # BLD AUTO: 0.1 THOUSANDS/ÂΜL (ref 0–0.1)
BASOPHILS NFR BLD AUTO: 1 % (ref 0–1)
BUN SERPL-MCNC: 25 MG/DL (ref 5–25)
CA-I BLD-SCNC: 1.11 MMOL/L (ref 1.12–1.32)
CALCIUM SERPL-MCNC: 7.9 MG/DL (ref 8.4–10.2)
CHLORIDE SERPL-SCNC: 108 MMOL/L (ref 96–108)
CO2 SERPL-SCNC: 25 MMOL/L (ref 21–32)
CREAT SERPL-MCNC: 1.39 MG/DL (ref 0.6–1.3)
EOSINOPHIL # BLD AUTO: 0.73 THOUSAND/ÂΜL (ref 0–0.61)
EOSINOPHIL NFR BLD AUTO: 8 % (ref 0–6)
ERYTHROCYTE [DISTWIDTH] IN BLOOD BY AUTOMATED COUNT: 14.1 % (ref 11.6–15.1)
GFR SERPL CREATININE-BSD FRML MDRD: 40 ML/MIN/1.73SQ M
GLUCOSE SERPL-MCNC: 123 MG/DL (ref 65–140)
GLUCOSE SERPL-MCNC: 127 MG/DL (ref 65–140)
HCO3 BLDA-SCNC: 25.7 MMOL/L (ref 24–30)
HCT VFR BLD AUTO: 35.4 % (ref 36.5–49.3)
HCT VFR BLD CALC: 34 % (ref 36.5–49.3)
HGB BLD-MCNC: 10.9 G/DL (ref 12–17)
HGB BLDA-MCNC: 11.6 G/DL (ref 12–17)
IMM GRANULOCYTES # BLD AUTO: 0.02 THOUSAND/UL (ref 0–0.2)
IMM GRANULOCYTES NFR BLD AUTO: 0 % (ref 0–2)
LYMPHOCYTES # BLD AUTO: 3.56 THOUSANDS/ÂΜL (ref 0.6–4.47)
LYMPHOCYTES NFR BLD AUTO: 40 % (ref 14–44)
MCH RBC QN AUTO: 29.6 PG (ref 26.8–34.3)
MCHC RBC AUTO-ENTMCNC: 30.8 G/DL (ref 31.4–37.4)
MCV RBC AUTO: 96 FL (ref 82–98)
MONOCYTES # BLD AUTO: 0.8 THOUSAND/ÂΜL (ref 0.17–1.22)
MONOCYTES NFR BLD AUTO: 9 % (ref 4–12)
NEUTROPHILS # BLD AUTO: 3.75 THOUSANDS/ÂΜL (ref 1.85–7.62)
NEUTS SEG NFR BLD AUTO: 42 % (ref 43–75)
NRBC BLD AUTO-RTO: 0 /100 WBCS
PCO2 BLD: 27 MMOL/L (ref 21–32)
PCO2 BLD: 41.4 MM HG (ref 42–50)
PH BLD: 7.4 [PH] (ref 7.3–7.4)
PLATELET # BLD AUTO: 320 THOUSANDS/UL (ref 149–390)
PMV BLD AUTO: 9.9 FL (ref 8.9–12.7)
PO2 BLD: 30 MM HG (ref 35–45)
POTASSIUM BLD-SCNC: 6.2 MMOL/L (ref 3.5–5.3)
POTASSIUM SERPL-SCNC: 4.1 MMOL/L (ref 3.5–5.3)
RBC # BLD AUTO: 3.68 MILLION/UL (ref 3.88–5.62)
SAO2 % BLD FROM PO2: 57 % (ref 60–85)
SODIUM BLD-SCNC: 140 MMOL/L (ref 136–145)
SODIUM SERPL-SCNC: 139 MMOL/L (ref 135–147)
SPECIMEN SOURCE: ABNORMAL
WBC # BLD AUTO: 8.96 THOUSAND/UL (ref 4.31–10.16)

## 2024-02-24 PROCEDURE — EDAIR PR ED AIR: Performed by: EMERGENCY MEDICINE

## 2024-02-24 PROCEDURE — 84132 ASSAY OF SERUM POTASSIUM: CPT

## 2024-02-24 PROCEDURE — 85025 COMPLETE CBC W/AUTO DIFF WBC: CPT | Performed by: SURGERY

## 2024-02-24 PROCEDURE — 99284 EMERGENCY DEPT VISIT MOD MDM: CPT | Performed by: SURGERY

## 2024-02-24 PROCEDURE — 12011 RPR F/E/E/N/L/M 2.5 CM/<: CPT | Performed by: SURGERY

## 2024-02-24 PROCEDURE — 84295 ASSAY OF SERUM SODIUM: CPT

## 2024-02-24 PROCEDURE — 80048 BASIC METABOLIC PNL TOTAL CA: CPT | Performed by: SURGERY

## 2024-02-24 PROCEDURE — 71045 X-RAY EXAM CHEST 1 VIEW: CPT

## 2024-02-24 PROCEDURE — 85014 HEMATOCRIT: CPT

## 2024-02-24 PROCEDURE — 70486 CT MAXILLOFACIAL W/O DYE: CPT

## 2024-02-24 PROCEDURE — 82947 ASSAY GLUCOSE BLOOD QUANT: CPT

## 2024-02-24 PROCEDURE — 72125 CT NECK SPINE W/O DYE: CPT

## 2024-02-24 PROCEDURE — 82803 BLOOD GASES ANY COMBINATION: CPT

## 2024-02-24 PROCEDURE — 82330 ASSAY OF CALCIUM: CPT

## 2024-02-24 PROCEDURE — 36415 COLL VENOUS BLD VENIPUNCTURE: CPT | Performed by: SURGERY

## 2024-02-24 PROCEDURE — 99284 EMERGENCY DEPT VISIT MOD MDM: CPT

## 2024-02-24 PROCEDURE — 76705 ECHO EXAM OF ABDOMEN: CPT | Performed by: SURGERY

## 2024-02-24 PROCEDURE — 93308 TTE F-UP OR LMTD: CPT | Performed by: SURGERY

## 2024-02-24 PROCEDURE — 70450 CT HEAD/BRAIN W/O DYE: CPT

## 2024-02-24 PROCEDURE — 73070 X-RAY EXAM OF ELBOW: CPT

## 2024-02-24 RX ORDER — LIDOCAINE HYDROCHLORIDE 10 MG/ML
5 INJECTION, SOLUTION EPIDURAL; INFILTRATION; INTRACAUDAL; PERINEURAL ONCE
Status: COMPLETED | OUTPATIENT
Start: 2024-02-24 | End: 2024-02-24

## 2024-02-24 RX ADMIN — LIDOCAINE HYDROCHLORIDE 5 ML: 10 INJECTION, SOLUTION EPIDURAL; INFILTRATION; INTRACAUDAL; PERINEURAL at 20:11

## 2024-02-24 NOTE — H&P
H&P - Trauma   Yunier Lott 89 y.o. male MRN: [id number]  Unit/Bed#: ED-31 Encounter: [id number]    Trauma Alert: Level B   Model of Arrival: Ambulance    Trauma Team: Attending Dr. Hanson and Residents Dr. Selby  Consultants:     None     Assessment/Plan   Active Problems / Assessment:   Fall  Nasal Laceration  Right elbow skin tear  Comminuted bilateral nasal bone fractures     Plan:   CTH  CT Facial bones  CT cervical spine  CXR  XR right elbow  Local wound care  Sutures    Patient currently has no complaints and at baseline.  He is hemodynamically stable and saturating in the upper 90s on room air.  No respiratory distress.    Discussed case with OMFS Dr. Nick Vasquez who stated surgery is not necessary at this time but may proceed with the patient felt that nasal breathing was uncomfortable, obstructed, or if he has difficulty breathing.  Patient stated that he had trouble breathing through the left nare but did not wish for surgery at this time and felt that it was not too difficult to breathe or uncomfortable for him to breathe through his nares.  Further discussed risk and benefits of surgery and not having surgery and patient continued to not want surgery at this time.    Patient medically cleared for discharge home to [facility].      History of Present Illness     Chief Complaint: Fall   Mechanism:Fall     HPI:    Yunier oLtt is a 89 y.o. male who presents with Fall.  EMS states that patient was at [facility] in wheelchair when he fell forward and hit a dresser drawer w/ his face.  Fall was witnessed.  No LOC.  On Eliquis.  Nasal laceration on scene.  Bleeding controlled.  EMS states that he is GCS 14 at baseline and confused to time.  Currently at baseline.  Patient denies any complaints at this time.    Review of Systems   Constitutional:  Negative for chills and fever.   Respiratory:  Negative for chest tightness and shortness of breath.    Cardiovascular:  Negative for chest pain and  palpitations.   Gastrointestinal:  Negative for abdominal pain, nausea and vomiting.   Musculoskeletal:  Negative for back pain, joint swelling, neck pain and neck stiffness.   Skin:  Positive for wound.   Neurological:  Negative for dizziness, weakness and headaches.   Psychiatric/Behavioral:  Negative for confusion.    All other systems reviewed and are negative.    12-point, complete review of systems was reviewed and negative except as stated above.     Historical Information     No past medical history on file.  No past surgical history on file.   Unable to obtain history due to Ambulatory dysfunction  Multiple falls  Hypertensive emergency  Chronic midline thoracic back pain  Gastroesophageal reflux disease without esophagitis  History of bladder cancer  Acute kidney injury superimposed on chronic kidney disease  Constipation  Persistent dry cough  Insomnia  Dyspnea on exertion  Generalized weakness  Goals of care, counseling/discussion  Elevated troponin  Generalized abdominal pain  Stage 3 chronic kidney disease (HCC)  Dysphagia  Ectropion of left lower eyelid  Skin tear of right forearm without complication  Venous ulcer (HCC)  Dry skin dermatitis         There is no immunization history on file for this patient.  Last Tetanus: 2/2021  Family History: Non-contributory    1. Before the illness or injury that brought you to the Emergency, did you need someone to help you on a regular basis? 1=Yes   2. Since the illness or injury that brought you to the Emergency, have you needed more help than usual to take care of yourself? 0=No   3. Have you been hospitalized for one or more nights during the past 6 months (excluding a stay in the Emergency Department)? 0=No   4. In general, do you see well? 1=No   5. In general, do you have serious problems with your memory? 0=No   6. Do you take more than three different medications everyday? 1=Yes   TOTAL   3     Did you order a geriatric consult if the score was 2 or  greater?: n/a     Meds/Allergies   all current active meds have been reviewed, current meds:   No current facility-administered medications for this encounter.   , and PTA meds:   None     Allergies have not been reviewed;  Not on File    Objective   Initial Vitals:   Temperature: (!) 97 °F (36.1 °C) (02/24/24 1703)  Pulse: 98 (02/24/24 1703)  Respirations: 16 (02/24/24 1703)  Blood Pressure: (!) 197/109 (02/24/24 1703)    Primary Survey:   Airway:        Status: patent;        Pre-hospital Interventions: none        Hospital Interventions: none  Breathing:        Pre-hospital Interventions: none       Effort: normal       Right breath sounds: normal       Left breath sounds: normal  Circulation:        Rhythm: regular no murmur       Rate: regular   Right Pulses Left Pulses    R radial: 2+  R femoral: 2+  R pedal: 2+  R carotid: 2+   L radial: 2+  L femoral: 2+  L pedal: 2+  L carotid: 2+     Disability:        GCS: Eye: 4; Verbal: 4 Motor: 6 Total: 14       Right Pupil: 3 mm;  round;  reactive         Left Pupil:  3 mm;  round;  reactive      R Motor Strength L Motor Strength    R : 5/5  R dorsiflex: 5/5  R plantarflex: 5/5 L : 5/5  L dorsiflex: 5/5  L plantarflex: 5/5        Sensory:  No sensory deficit  Exposure:       Completed: Yes      Secondary Survey:  Physical Exam  Vitals reviewed.   Constitutional:       General: He is not in acute distress.     Appearance: Normal appearance. He is obese. He is ill-appearing. He is not toxic-appearing or diaphoretic.   HENT:      Head: Normocephalic.      Comments: Sagittal 1 inch laceration over the nasal bridge.      Right Ear: External ear normal.      Left Ear: External ear normal.      Nose:      Comments: Laceration noted as above     Mouth/Throat:      Mouth: Mucous membranes are moist.      Pharynx: Oropharynx is clear.   Eyes:      Extraocular Movements: Extraocular movements intact.      Conjunctiva/sclera: Conjunctivae normal.      Pupils: Pupils are  equal, round, and reactive to light.   Neck:      Comments: No midline C/T/L/S spine tenderness, step-offs, deformities.  Cardiovascular:      Rate and Rhythm: Normal rate and regular rhythm.      Pulses: Normal pulses.      Heart sounds: Normal heart sounds.   Pulmonary:      Effort: Pulmonary effort is normal.      Breath sounds: Normal breath sounds.   Abdominal:      General: There is no distension.      Palpations: Abdomen is soft.      Tenderness: There is no abdominal tenderness. There is no guarding.   Musculoskeletal:         General: No swelling, tenderness, deformity or signs of injury. Normal range of motion.      Cervical back: Neck supple. No rigidity or tenderness.      Right lower leg: Edema (3+) present.      Left lower leg: Edema (3+) present.   Skin:     General: Skin is warm and dry.      Capillary Refill: Capillary refill takes less than 2 seconds.      Coloration: Skin is not jaundiced or pale.      Findings: No bruising, erythema, lesion or rash.      Comments: 4 inch skin tear on right elbow.   Neurological:      General: No focal deficit present.      Mental Status: He is alert. Mental status is at baseline.   Psychiatric:         Mood and Affect: Mood normal.         Behavior: Behavior normal.         Invasive Devices       Peripheral Intravenous Line  Duration             Peripheral IV 02/24/24 Dorsal (posterior);Right Hand <1 day                  Lab Results: I have personally reviewed all pertinent laboratory/test results 02/24/24 and in the preceding 24 hours.  Recent Labs     02/24/24  1719 02/24/24  1725 02/24/24  1800   WBC  --  8.96  --    HGB 11.6* 10.9*  --    HCT 34* 35.4*  --    PLT  --  320  --    SODIUM  --   --  139   K  --   --  4.1   CL  --   --  108   CO2 27  --  25   BUN  --   --  25   CREATININE  --   --  1.39*   GLUC  --   --  123   CAIONIZED 1.11*  --   --        Imaging Results: I have personally reviewed pertinent images saved in PACS. CT scan findings (and other  pertinent positive findings on images) were discussed with radiology. My interpretation of the images/reports are as follows:  Chest Xray(s): negative for acute findings   FAST exam(s): negative for acute findings   CT Scan(s): positive for acute findings: As below   Additional Xray(s): negative for acute findings   XR elbow 2 vw right    Result Date: 2/24/2024  Impression: No acute osseous abnormality. Workstation performed: [id number]     TRAUMA - CT facial bones wo contrast    Result Date: 2/24/2024  Impression: 1. Comminuted bilateral nasal bone fractures 2. Small amount of hemorrhage in the left nasal cavity, left nasopharynx and left maxillary sinus. I personally discussed this study with PHIL CARLOS on 2/24/2024 6:25 PM. Workstation performed: [id number]     XR Trauma multiple ([facility] trauma bay ONLY)    Result Date: 2/24/2024  Impression: Tiny right basilar effusion with suspected mild pulmonary interstitial edema. Interstitial lung disease of other etiology not excluded. Workstation performed: [id number]     TRAUMA - CT spine cervical wo contrast    Result Date: 2/24/2024  Impression: No acute fracture Trace grade 1 anterolisthesis of C3 on C4 is likely spondylotic Moderate spondylosis Workstation performed: [id number]     TRAUMA - CT head wo contrast    Result Date: 2/24/2024  Impression: 1. Soft tissue injury to the nose with nasal bone fractures and adjacent nostril hematoma. See separate facial bone CT. 2. Small hyperdense fluid in the left maxillary sinus likely posttraumatic. 3. No acute intracranial hemorrhage. 4. Advanced microangiopathy. 5. No skull fracture. Workstation performed: [id number]      Other Studies: No new    Code Status: No Order  Advance Directive and Living Will:      Power of :    POLST:    I have spent 30 minutes with Patient  today in which greater than 50% of this time was spent in counseling/coordination of care regarding Diagnostic results, Documenting in  the medical record, Reviewing / ordering tests, medicine, procedures  , Obtaining or reviewing history  , and Communicating with other healthcare professionals .

## 2024-02-24 NOTE — QUICK NOTE
Cervical Collar Clearance:    The patient had a CT scan of the cervical spine demonstrating no acute injury. On exam, the patient had no midline point tenderness or paresthesias/numbness/weakness in the extremities. The patient had full range of motion (was then able to flex, extend, and rotate head laterally) without pain. There were no distracting injuries and the patient was not intoxicated.      The patient's cervical spine was cleared radiologically and clinically. Cervical collar removed at this time.     Rama Selby MD  2/24/2024 6:51 PM

## 2024-02-24 NOTE — ED PROVIDER NOTES
Emergency Department Airway Evaluation and Management Form    History  Obtained from: EMS/Patient    Patient has no allergy information on record.  No chief complaint on file.    HPI    89-year-old male with fall from wheelchair and head strike on Eliquis brought for evaluation by EMS.  He is awake and alert here.  Breathing comfortably on room air.  No acute airway intervention needed.  Further management per trauma team.    No past medical history on file.  No past surgical history on file.  No family history on file.     I have reviewed and agree with the history as documented.    Review of Systems    Physical Exam  BP (!) 197/109   Pulse 98   Temp (!) 97 °F (36.1 °C) (Oral)   Resp 16   SpO2 95%     Physical Exam    ED Medications  Medications - No data to display    Intubation  Procedures    Notes      Final Diagnosis  Final diagnoses:   None       ED Provider  Electronically Signed by     Eliezer Pruitt MD  02/24/24 6742

## 2024-02-25 NOTE — PROCEDURES
Universal Protocol:  Procedure performed by: (Dr. Hanson)  Consent: Verbal consent obtained. Written consent not obtained.  Consent given by: patient  Patient understanding: patient states understanding of the procedure being performed  Patient consent: the patient's understanding of the procedure matches consent given  Procedure consent: procedure consent matches procedure scheduled  Relevant documents: relevant documents present and verified  Test results: test results available and properly labeled  Required items: required blood products, implants, devices, and special equipment available  Patient identity confirmed: verbally with patient  Laceration repair    Date/Time: 2/24/2024 8:53 PM    Performed by: Rama Selby MD  Authorized by: Rama Selby MD  Body area: head/neck  Location details: nose  Laceration length: 2.5 cm  Foreign bodies: no foreign bodies  Tendon involvement: none  Nerve involvement: none  Vascular damage: no  Anesthesia: local infiltration    Anesthesia:  Local Anesthetic: lidocaine 1% without epinephrine  Anesthetic total: 3 mL    Sedation:  Patient sedated: no        Procedure Details:  Preparation: Patient was prepped and draped in the usual sterile fashion.  Irrigation solution: saline  Irrigation method: syringe  Amount of cleaning: standard  Debridement: none  Degree of undermining: none  Skin closure: 5-0 Prolene  Number of sutures: 5  Technique: simple  Approximation: close  Approximation difficulty: simple  Patient tolerance: patient tolerated the procedure well with no immediate complications

## 2024-02-25 NOTE — DISCHARGE INSTRUCTIONS
-Do not get sutures wet for the first 24 hours.   -You may apply antibiotic ointment for the first 2 days but after that there should be a scab developing so leave it clean, dry, and covered.   -After 24 hours, you may wash with warm soap and water once daily.    -Follow-up with family doctor or urgent care for suture removal in 7 days.  -To prevent scarring, avoid direct sunlight or apply sunscreen to the wound after it has healed.     -5 sutures were placed today  -Return to the ER if the affected area becomes red, hot, swollen, has any pus-like discharge or you develop any fevers.

## 2024-02-25 NOTE — PROCEDURES
POC FAST US    Date/Time: 2/24/2024 5:07 PM    Performed by: Rama Selby MD  Authorized by: Rama Selby MD    Patient location:  Trauma  Procedure performed by consultant: Dr. Hanson.    Procedure details:     Exam Type:  Diagnostic    Indications comment:  Fall    Assess for:  Intra-abdominal fluid and pericardial effusion    Technique: FAST      Views obtained:  Heart - Pericardial sac, RUQ - Louie's Pouch, Suprapubic - Pouch of Brent and LUQ - Splenorenal space    Image quality: diagnostic      Image availability:  Images available in PACS  FAST Findings:     RUQ (Hepatorenal) free fluid: absent      LUQ (Splenorenal) free fluid: absent      Suprapubic free fluid: absent      Cardiac wall motion: identified      Pericardial effusion: absent    Interpretation:     Impressions: negative